# Patient Record
Sex: FEMALE | Race: WHITE | NOT HISPANIC OR LATINO | Employment: OTHER | URBAN - METROPOLITAN AREA
[De-identification: names, ages, dates, MRNs, and addresses within clinical notes are randomized per-mention and may not be internally consistent; named-entity substitution may affect disease eponyms.]

---

## 2017-01-01 ENCOUNTER — HOSPITAL ENCOUNTER (OUTPATIENT)
Facility: MEDICAL CENTER | Age: 62
End: 2017-01-01
Attending: NURSE PRACTITIONER
Payer: COMMERCIAL

## 2017-01-01 ENCOUNTER — OFFICE VISIT (OUTPATIENT)
Dept: URGENT CARE | Facility: CLINIC | Age: 62
End: 2017-01-01
Payer: COMMERCIAL

## 2017-01-01 VITALS
RESPIRATION RATE: 16 BRPM | HEIGHT: 67 IN | SYSTOLIC BLOOD PRESSURE: 120 MMHG | DIASTOLIC BLOOD PRESSURE: 82 MMHG | BODY MASS INDEX: 28.25 KG/M2 | TEMPERATURE: 98.1 F | HEART RATE: 84 BPM | OXYGEN SATURATION: 98 % | WEIGHT: 180 LBS

## 2017-01-01 DIAGNOSIS — N30.01 ACUTE CYSTITIS WITH HEMATURIA: ICD-10-CM

## 2017-01-01 DIAGNOSIS — R30.0 DYSURIA: ICD-10-CM

## 2017-01-01 LAB
APPEARANCE UR: NORMAL
BILIRUB UR STRIP-MCNC: NORMAL MG/DL
COLOR UR AUTO: YELLOW
GLUCOSE UR STRIP.AUTO-MCNC: NORMAL MG/DL
KETONES UR STRIP.AUTO-MCNC: NORMAL MG/DL
LEUKOCYTE ESTERASE UR QL STRIP.AUTO: NORMAL
NITRITE UR QL STRIP.AUTO: NORMAL
PH UR STRIP.AUTO: 6.5 [PH] (ref 5–8)
PROT UR QL STRIP: NORMAL MG/DL
RBC UR QL AUTO: NORMAL
SP GR UR STRIP.AUTO: 1
UROBILINOGEN UR STRIP-MCNC: NORMAL MG/DL

## 2017-01-01 PROCEDURE — 87086 URINE CULTURE/COLONY COUNT: CPT

## 2017-01-01 PROCEDURE — 99000 SPECIMEN HANDLING OFFICE-LAB: CPT | Performed by: NURSE PRACTITIONER

## 2017-01-01 PROCEDURE — 87077 CULTURE AEROBIC IDENTIFY: CPT

## 2017-01-01 PROCEDURE — 99214 OFFICE O/P EST MOD 30 MIN: CPT | Performed by: NURSE PRACTITIONER

## 2017-01-01 PROCEDURE — 87186 SC STD MICRODIL/AGAR DIL: CPT

## 2017-01-01 PROCEDURE — 81002 URINALYSIS NONAUTO W/O SCOPE: CPT | Performed by: NURSE PRACTITIONER

## 2017-01-01 RX ORDER — NITROFURANTOIN 25; 75 MG/1; MG/1
100 CAPSULE ORAL 2 TIMES DAILY
Qty: 14 CAP | Refills: 0 | Status: SHIPPED | OUTPATIENT
Start: 2017-01-01 | End: 2017-01-08

## 2017-01-01 RX ORDER — PHENAZOPYRIDINE HYDROCHLORIDE 200 MG/1
200 TABLET, FILM COATED ORAL 3 TIMES DAILY PRN
Qty: 10 TAB | Refills: 0 | Status: SHIPPED | OUTPATIENT
Start: 2017-01-01 | End: 2017-12-05

## 2017-01-01 ASSESSMENT — ENCOUNTER SYMPTOMS
MYALGIAS: 0
CHILLS: 0
FEVER: 0
NAUSEA: 0
VOMITING: 0

## 2017-01-01 NOTE — MR AVS SNAPSHOT
"        Keisha Vega Dariela   2017 11:45 AM   Office Visit   MRN: [id number]    Department:  [facility]   Dept Phone:  [phone]    Description:  Female : 1955   Provider:  Cathey J Hamman, A.P.N.           Reason for Visit     Dysuria urgency, frequency, blood in urine, tenderness      Allergies as of 2017     Allergen Noted Reactions    Codeine 2009   Hives    Erythromycin 2009   Hives, Vomiting    Rxn = unknown - years ago     Inapsine [Droperidol] 2009       agitated      You were diagnosed with     Dysuria   [788.1.ICD-9-CM]       Acute cystitis with hematuria   [680117]         Vital Signs     Blood Pressure Pulse Temperature Respirations Height Weight    120/82 mmHg 84 36.7 °C (98.1 °F) 16 1.702 m (5' 7\") 81.647 kg (180 lb)    Body Mass Index Oxygen Saturation Breastfeeding?             28.19 kg/m2 98% No         Basic Information     Date Of Birth Sex Race Ethnicity Preferred Language    1955 Female White Non- English      Problem List              ICD-10-CM Priority Class Noted - Resolved    Vomiting R11.10   3/15/2014 - Present    Chills with fever R50.9   3/15/2014 - Present    Fever R50.9   3/15/2014 - Present    Metabolic acidosis E87.2   3/16/2014 - Present    Hypotension (arterial) I95.9   3/16/2014 - Present    Iatrogenic hyperthyroidism E05.80   3/16/2014 - Present    Hypokalemia E87.6   3/16/2014 - Present    Lower urinary tract infectious disease N39.0   3/16/2014 - Present    HTN (hypertension) I10   3/17/2014 - Present    Bradycardia R00.1   3/18/2014 - Present    Epigastric pain R10.13   3/18/2014 - Present    Volume overload E87.70   3/18/2014 - Present    Mitral valve disorder I05.9   2014 - Present    GERD (gastroesophageal reflux disease) K21.9   10/22/2015 - Present    Leukocytosis D72.829   10/22/2015 - Present    Serum lipase elevation R74.8   10/22/2015 - Present    Hematemesis with nausea K92.0, R11.0   10/22/2015 - Present   "   Health Maintenance        Date Due Completion Dates    IMM DTaP/Tdap/Td Vaccine (1 - Tdap) 2/11/1975 ---    PAP SMEAR 2/11/1977 ---    COLONOSCOPY 2/11/2006 ---    IMM ZOSTER VACCINE 2/11/2016 ---    IMM INFLUENZA (1) 9/1/2016 10/7/2015, 10/18/2013    MAMMOGRAM 11/28/2017 11/28/2016, 5/19/2015, 11/27/2012, 7/7/2011, 5/20/2010, 5/20/2010, 3/1/2006, 2/3/2005            Current Immunizations     Influenza TIV (IM) 10/7/2015, 10/18/2013    Pneumococcal Vaccine (PCV7) Historical Data 10/1/2014      Below and/or attached are the medications your provider expects you to take. Review all of your home medications and newly ordered medications with your provider and/or pharmacist. Follow medication instructions as directed by your provider and/or pharmacist. Please keep your medication list with you and share with your provider. Update the information when medications are discontinued, doses are changed, or new medications (including over-the-counter products) are added; and carry medication information at all times in the event of emergency situations     Allergies:  CODEINE - Hives     ERYTHROMYCIN - Hives,Vomiting     INAPSINE - (reactions not documented)               Medications  Valid as of: January 01, 2017 - 12:12 PM    Generic Name Brand Name Tablet Size Instructions for use    Albuterol Sulfate (Aero Soln) albuterol 108 (90 BASE) MCG/ACT Inhale 2 Puffs by mouth every 6 hours as needed for Shortness of Breath.        DiphenhydrAMINE HCl (Tab) BENADRYL 25 MG Take 12.5 mg by mouth at bedtime as needed.  May repeat 1 time. for Sleep.        Est Estrogens-Methyltest (Tab) ESTRATEST 1.25-2.5 MG Take 1 Tab by mouth every day.        Hydrocod Polst-Chlorphen Polst (Liquid CR) TUSSIONEX 10-8 MG/5ML Take 5 mL by mouth every 12 hours.        Melatonin (Tab) Melatonin 5 MG Take  by mouth.        Multiple Vitamin (Tab) THERAGRAN  Take 1 Tab by mouth every day. Indications: **OTC**        Nitrofurantoin Monohyd Macro (Cap)  MACROBID 100 MG Take 1 Cap by mouth 2 times a day for 7 days.        Omega-3 Fatty Acids (Cap) OMEGA 3 FA 1000 MG Take 1,000 mg by mouth 2 Times a Day. Indications: **OTC**        Omeprazole (CAPSULE DELAYED RELEASE) PRILOSEC 20 MG Take 1 Cap by mouth 2 times a day. Indications: **OTC**        Phenazopyridine HCl (Tab) PYRIDIUM 200 MG Take 1 Tab by mouth 3 times a day as needed.        Potassium Chloride Abimbola CR (Tab CR) K-DUR 20 MEQ Take 20 mEq by mouth every evening.        Valsartan-Hydrochlorothiazide (Tab) DIOVAN--12.5 MG Take 1 Tab by mouth every day.        .                 Medicines prescribed today were sent to:     [facility] #[id number] - [place] - [address]    [address] [place] [address]    Phone: [phone] Fax: [phone]    Open 24 Hours?: No      Medication refill instructions:       If your prescription bottle indicates you have medication refills left, it is not necessary to call your provider’s office. Please contact your pharmacy and they will refill your medication.    If your prescription bottle indicates you do not have any refills left, you may request refills at any time through one of the following ways: The online [email or web address] system (except Urgent Care), by calling your provider’s office, or by asking your pharmacy to contact your provider’s office with a refill request. Medication refills are processed only during regular business hours and may not be available until the next business day. Your provider may request additional information or to have a follow-up visit with you prior to refilling your medication.   *Please Note: Medication refills are assigned a new Rx number when refilled electronically. Your pharmacy may indicate that no refills were authorized even though a new prescription for the same medication is available at the pharmacy. Please request the medicine by name with the pharmacy before contacting your provider for a refill.        Your To Do List      Future Labs/Procedures Complete By Expires    URINE CULTURE(NEW)  As directed 1/1/2018         MyChart Access Code: Activation code not generated  Current dinCloud Status: Active

## 2017-01-01 NOTE — PROGRESS NOTES
"Subjective:      Keisha Lyle is a 61 y.o. female who presents with Dysuria    Past Medical History   Diagnosis Date   • Hypertension      medicated     Social History     Social History   • Marital Status:      Spouse Name: N/A   • Number of Children: N/A   • Years of Education: N/A     Occupational History   • Not on file.     Social History Main Topics   • Smoking status: Never Smoker    • Smokeless tobacco: Not on file   • Alcohol Use: Yes      Comment: occ   • Drug Use: No   • Sexual Activity: Not on file     Other Topics Concern   • Not on file     Social History Narrative     Family History   Problem Relation Age of Onset   • Cancer Paternal Grandmother      Patient presents today with complaint of dysuria urgency and frequency with blood in her urine. Symptoms started yesterday evening and worsened through the night. Patient denies any fever, aches, or chills. States she has some mild pain to the right flank area but she believes this may be muscular as she did split wood yesterday          Dysuria   This is a new problem. The current episode started yesterday. The problem occurs every urination. The problem has been gradually worsening. The quality of the pain is described as aching and burning. The pain is moderate. There has been no fever. Associated symptoms include frequency, hematuria and urgency. Pertinent negatives include no chills, nausea or vomiting. She has tried increased fluids for the symptoms. The treatment provided no relief.       Review of Systems   Constitutional: Negative for fever, chills and malaise/fatigue.   Gastrointestinal: Negative for nausea and vomiting.   Genitourinary: Positive for dysuria, urgency, frequency and hematuria.   Musculoskeletal: Negative for myalgias.     All other systems reviewed and are negative      Objective:     /82 mmHg  Pulse 84  Temp(Src) 36.7 °C (98.1 °F)  Resp 16  Ht 1.702 m (5' 7\")  Wt 81.647 kg (180 lb)  BMI 28.19 kg/m2  " SpO2 98%  Breastfeeding? No     Physical Exam   Constitutional: She is oriented to person, place, and time. She appears well-developed and well-nourished. No distress.   Cardiovascular: Normal rate and regular rhythm.    Pulmonary/Chest: Effort normal and breath sounds normal.   Abdominal: Soft. She exhibits no distension and no mass. There is tenderness. There is no rebound and no guarding. No hernia.   Positive suprapubic tenderness  Mild right CVA tenderness   Neurological: She is alert and oriented to person, place, and time.   Skin: Skin is warm and dry. She is not diaphoretic.   Psychiatric: She has a normal mood and affect. Her behavior is normal.   Vitals reviewed.           UA: positive blood, positive leukocytes     Assessment/Plan:   Hemorrhagic cystitis  Right flank pain, mild  -Macrobid  -Urine culture  -Pyridium  -Strict ER precautions for increasing flank pain, fever, flulike symptoms, weakness  There are no diagnoses linked to this encounter.

## 2017-01-02 DIAGNOSIS — R30.0 DYSURIA: ICD-10-CM

## 2017-01-04 LAB
BACTERIA UR CULT: ABNORMAL
SIGNIFICANT IND 70042: ABNORMAL
SOURCE SOURCE: ABNORMAL

## 2017-08-14 ENCOUNTER — OFFICE VISIT (OUTPATIENT)
Dept: URGENT CARE | Facility: CLINIC | Age: 62
End: 2017-08-14
Payer: COMMERCIAL

## 2017-08-14 VITALS
HEART RATE: 71 BPM | HEIGHT: 67 IN | OXYGEN SATURATION: 94 % | DIASTOLIC BLOOD PRESSURE: 80 MMHG | TEMPERATURE: 97.9 F | WEIGHT: 185 LBS | SYSTOLIC BLOOD PRESSURE: 108 MMHG | BODY MASS INDEX: 29.03 KG/M2

## 2017-08-14 DIAGNOSIS — I10 ESSENTIAL HYPERTENSION: ICD-10-CM

## 2017-08-14 DIAGNOSIS — H69.91 EUSTACHIAN TUBE DYSFUNCTION, RIGHT: ICD-10-CM

## 2017-08-14 DIAGNOSIS — H81.10 BPPV (BENIGN PAROXYSMAL POSITIONAL VERTIGO), UNSPECIFIED LATERALITY: ICD-10-CM

## 2017-08-14 PROCEDURE — 93000 ELECTROCARDIOGRAM COMPLETE: CPT | Performed by: FAMILY MEDICINE

## 2017-08-14 PROCEDURE — 99214 OFFICE O/P EST MOD 30 MIN: CPT | Performed by: FAMILY MEDICINE

## 2017-08-14 RX ORDER — ONDANSETRON HYDROCHLORIDE 8 MG/1
8 TABLET, FILM COATED ORAL EVERY 8 HOURS PRN
Qty: 15 TAB | Refills: 0 | Status: SHIPPED | OUTPATIENT
Start: 2017-08-14 | End: 2017-12-05

## 2017-08-14 RX ORDER — MECLIZINE HYDROCHLORIDE 25 MG/1
25 TABLET ORAL 3 TIMES DAILY PRN
Qty: 15 TAB | Refills: 0 | Status: SHIPPED | OUTPATIENT
Start: 2017-08-14 | End: 2017-12-05

## 2017-08-14 NOTE — PROGRESS NOTES
Subjective:      Keisha Lyle is a 62 y.o. female who presents with Vertigo   patient presents to urgent care with acute onset of feeling dizzy when she moves her head. She was at dinner with her family on Saturday when she got up she felt that she was spinning the room was spinning as well got better as she sat down. Symptoms improved overnight she took a Claritin thinking maybe there was some fluid behind her ear but it has been off-and-on since then. She's had some mild nausea as well. No problems with thought process or speech no numbness tingling or weakness of any of the extremities. No recent head trauma. She denies any chest pain or shortness of breath.      HPI  ROS    PMH:  has a past medical history of Hypertension. She also has no past medical history of Breast cancer (CMS-HCC).  MEDS:   Current outpatient prescriptions:   •  omeprazole (PRILOSEC) 20 MG delayed-release capsule, Take 1 Cap by mouth 2 times a day. Indications: **OTC**, Disp: , Rfl:   •  esterified estrogen-methyltest (ESTRATEST) 1.25-2.5 MG Tab, Take 1 Tab by mouth every day., Disp: , Rfl:   •  Melatonin 5 MG Tab, Take  by mouth., Disp: , Rfl:   •  potassium chloride SA (K-DUR) 20 MEQ Tab CR, Take 20 mEq by mouth every evening., Disp: , Rfl:   •  valsartan-hydrochlorothiazide (DIOVAN HCT) 160-12.5 MG per tablet, Take 1 Tab by mouth every day., Disp: , Rfl:   •  multivitamin (THERAGRAN) TABS, Take 1 Tab by mouth every day. Indications: **OTC**, Disp: , Rfl:   •  docosahexanoic acid (OMEGA 3 FA) 1000 MG CAPS, Take 1,000 mg by mouth 2 Times a Day. Indications: **OTC**, Disp: , Rfl:   •  phenazopyridine (PYRIDIUM) 200 MG Tab, Take 1 Tab by mouth 3 times a day as needed., Disp: 10 Tab, Rfl: 0  •  hydrocod polst-chlorphen polst (TUSSIONEX PENNKINETIC ER) 10-8 MG/5ML Liquid CR, Take 5 mL by mouth every 12 hours., Disp: 120 mL, Rfl: 0  •  albuterol (VENTOLIN OR PROVENTIL) 108 (90 BASE) MCG/ACT Aero Soln inhalation aerosol, Inhale 2 Puffs by  "mouth every 6 hours as needed for Shortness of Breath., Disp: 8.5 g, Rfl: 3  •  diphenhydrAMINE (BENADRYL) 25 MG Tab, Take 12.5 mg by mouth at bedtime as needed.  May repeat 1 time. for Sleep., Disp: , Rfl:   ALLERGIES:   Allergies   Allergen Reactions   • Codeine Hives   • Erythromycin Hives and Vomiting     Rxn = unknown - years ago    • Inapsine [Droperidol]      agitated   SURGHX:   Past Surgical History   Procedure Laterality Date   • Other abdominal surgery       appy   • Gyn surgery       hysterectomy   • Other       tonsillectomy   SOCHX:  reports that she has never smoked. She has never used smokeless tobacco. She reports that she drinks alcohol. She reports that she does not use illicit drugs.  FH: Family history was reviewed, no pertinent findings to report   Objective:     /80 mmHg  Pulse 71  Temp(Src) 36.6 °C (97.9 °F)  Ht 1.702 m (5' 7\")  Wt 83.915 kg (185 lb)  BMI 28.97 kg/m2  SpO2 94%     Physical Exam   Constitutional: She is oriented to person, place, and time. She appears well-developed and well-nourished. No distress.   HENT:   Mouth/Throat: Oropharynx is clear and moist.   Right TM with mild fluid behind the present   Eyes: Conjunctivae and EOM are normal. Pupils are equal, round, and reactive to light.   Neck: Normal range of motion.   Cardiovascular: Normal rate, regular rhythm, normal heart sounds and intact distal pulses.  Exam reveals no gallop and no friction rub.    No murmur heard.  Pulmonary/Chest: Effort normal and breath sounds normal. No respiratory distress. She has no wheezes. She has no rales. She exhibits no tenderness.   Musculoskeletal: Normal range of motion.   Neurological: She is alert and oriented to person, place, and time.   Dizzy symptoms are reproduced when patient's head is moved from varying positions.   Skin: Skin is warm and dry. No rash noted. She is not diaphoretic. No erythema. No pallor.   Psychiatric: She has a normal mood and affect. Her behavior " is normal. Judgment and thought content normal.       Diagnostics: EKG wnl no acute abnormality per my review     Assessment/Plan:     1. BPPV (benign paroxysmal positional vertigo), unspecified laterality  EKG    EKG    meclizine (ANTIVERT) 25 MG Tab    ondansetron (ZOFRAN) 8 MG Tab   2. Essential hypertension     3. Eustachian tube dysfunction, right       Follow-up with primary care provider within 4-5 days, emergency room precautions discussed.  Patient and/or family appears understanding of information.

## 2017-08-14 NOTE — MR AVS SNAPSHOT
"        Keisha Vega Dariela   2017 11:00 AM   Office Visit   MRN: [id number]    Department:  [facility]   Dept Phone:  [phone]    Description:  Female : 1955   Provider:  Fe Junior D.O.           Reason for Visit     Vertigo started on Sat., was better yesterday, last night very dizzy      Allergies as of 2017     Allergen Noted Reactions    Codeine 2009   Hives    Erythromycin 2009   Hives, Vomiting    Rxn = unknown - years ago     Inapsine [Droperidol] 2009       agitated      You were diagnosed with     BPPV (benign paroxysmal positional vertigo), unspecified laterality   [5941573]       Essential hypertension   [0452195]         Vital Signs     Blood Pressure Pulse Temperature Height Weight Body Mass Index    108/80 mmHg 71 36.6 °C (97.9 °F) 1.702 m (5' 7\") 83.915 kg (185 lb) 28.97 kg/m2    Oxygen Saturation Smoking Status                94% Never Smoker           Basic Information     Date Of Birth Sex Race Ethnicity Preferred Language    1955 Female White Non- English      Problem List              ICD-10-CM Priority Class Noted - Resolved    Vomiting R11.10   3/15/2014 - Present    Chills with fever R50.9   3/15/2014 - Present    Fever R50.9   3/15/2014 - Present    Metabolic acidosis E87.2   3/16/2014 - Present    Hypotension (arterial) I95.9   3/16/2014 - Present    Iatrogenic hyperthyroidism E05.80   3/16/2014 - Present    Hypokalemia E87.6   3/16/2014 - Present    Lower urinary tract infectious disease N39.0   3/16/2014 - Present    HTN (hypertension) I10   3/17/2014 - Present    Bradycardia R00.1   3/18/2014 - Present    Epigastric pain R10.13   3/18/2014 - Present    Volume overload E87.70   3/18/2014 - Present    Mitral valve disorder I05.9   2014 - Present    GERD (gastroesophageal reflux disease) K21.9   10/22/2015 - Present    Leukocytosis D72.829   10/22/2015 - Present    Serum lipase elevation R74.8   10/22/2015 - Present   " Hematemesis with nausea K92.0, R11.0   10/22/2015 - Present      Health Maintenance        Date Due Completion Dates    IMM DTaP/Tdap/Td Vaccine (1 - Tdap) 2/11/1975 ---    PAP SMEAR 2/11/1977 ---    COLONOSCOPY 2/11/2006 ---    IMM ZOSTER VACCINE 2/11/2016 ---    IMM INFLUENZA (1) 9/1/2017 10/7/2015, 10/18/2013    MAMMOGRAM 11/28/2017 11/28/2016, 5/19/2015, 11/27/2012, 7/7/2011, 5/20/2010, 5/20/2010, 3/1/2006, 2/3/2005            Current Immunizations     Influenza TIV (IM) 10/7/2015, 10/18/2013    Pneumococcal Vaccine (PCV7) Historical Data 10/1/2014      Below and/or attached are the medications your provider expects you to take. Review all of your home medications and newly ordered medications with your provider and/or pharmacist. Follow medication instructions as directed by your provider and/or pharmacist. Please keep your medication list with you and share with your provider. Update the information when medications are discontinued, doses are changed, or new medications (including over-the-counter products) are added; and carry medication information at all times in the event of emergency situations     Allergies:  CODEINE - Hives     ERYTHROMYCIN - Hives,Vomiting     INAPSINE - (reactions not documented)               Medications  Valid as of: August 14, 2017 - 11:53 AM    Generic Name Brand Name Tablet Size Instructions for use    Albuterol Sulfate (Aero Soln) albuterol 108 (90 BASE) MCG/ACT Inhale 2 Puffs by mouth every 6 hours as needed for Shortness of Breath.        DiphenhydrAMINE HCl (Tab) BENADRYL 25 MG Take 12.5 mg by mouth at bedtime as needed.  May repeat 1 time. for Sleep.        Est Estrogens-Methyltest (Tab) ESTRATEST 1.25-2.5 MG Take 1 Tab by mouth every day.        Hydrocod Polst-Chlorphen Polst (Liquid CR) TUSSIONEX 10-8 MG/5ML Take 5 mL by mouth every 12 hours.        Melatonin (Tab) Melatonin 5 MG Take  by mouth.        Multiple Vitamin (Tab) THERAGRAN  Take 1 Tab by mouth every day.  Indications: **OTC**        Omega-3 Fatty Acids (Cap) OMEGA 3 FA 1000 MG Take 1,000 mg by mouth 2 Times a Day. Indications: **OTC**        Omeprazole (CAPSULE DELAYED RELEASE) PRILOSEC 20 MG Take 1 Cap by mouth 2 times a day. Indications: **OTC**        Phenazopyridine HCl (Tab) PYRIDIUM 200 MG Take 1 Tab by mouth 3 times a day as needed.        Potassium Chloride Abimbola CR (Tab CR) Kdur 20 MEQ Take 20 mEq by mouth every evening.        Valsartan-Hydrochlorothiazide (Tab) DIOVAN--12.5 MG Take 1 Tab by mouth every day.        .                 Medicines prescribed today were sent to:     [facility] #[id number] - [place], [place] - [address]    [address] [place] [address]    Phone: [phone] Fax: [phone]    Open 24 Hours?: No      Medication refill instructions:       If your prescription bottle indicates you have medication refills left, it is not necessary to call your provider’s office. Please contact your pharmacy and they will refill your medication.    If your prescription bottle indicates you do not have any refills left, you may request refills at any time through one of the following ways: The online [employer] system (except Urgent Care), by calling your provider’s office, or by asking your pharmacy to contact your provider’s office with a refill request. Medication refills are processed only during regular business hours and may not be available until the next business day. Your provider may request additional information or to have a follow-up visit with you prior to refilling your medication.   *Please Note: Medication refills are assigned a new Rx number when refilled electronically. Your pharmacy may indicate that no refills were authorized even though a new prescription for the same medication is available at the pharmacy. Please request the medicine by name with the pharmacy before contacting your provider for a refill.        Your To Do List     Future Labs/Procedures Complete By  Expires    [facility]  As directed 8/14/2018         Capture Media Access Code: Activation code not generated  Current Capture Media Status: Active

## 2017-12-05 ENCOUNTER — OFFICE VISIT (OUTPATIENT)
Dept: URGENT CARE | Facility: CLINIC | Age: 62
End: 2017-12-05
Payer: COMMERCIAL

## 2017-12-05 VITALS
WEIGHT: 185 LBS | OXYGEN SATURATION: 94 % | HEART RATE: 88 BPM | SYSTOLIC BLOOD PRESSURE: 120 MMHG | TEMPERATURE: 98.9 F | BODY MASS INDEX: 29.03 KG/M2 | RESPIRATION RATE: 20 BRPM | DIASTOLIC BLOOD PRESSURE: 78 MMHG | HEIGHT: 67 IN

## 2017-12-05 DIAGNOSIS — J20.9 ACUTE BRONCHITIS, UNSPECIFIED ORGANISM: Primary | ICD-10-CM

## 2017-12-05 DIAGNOSIS — J98.8 RTI (RESPIRATORY TRACT INFECTION): ICD-10-CM

## 2017-12-05 LAB
FLUAV+FLUBV AG SPEC QL IA: NEGATIVE
INT CON NEG: NEGATIVE
INT CON POS: POSITIVE

## 2017-12-05 PROCEDURE — 87804 INFLUENZA ASSAY W/OPTIC: CPT | Performed by: FAMILY MEDICINE

## 2017-12-05 PROCEDURE — 99214 OFFICE O/P EST MOD 30 MIN: CPT | Performed by: FAMILY MEDICINE

## 2017-12-05 RX ORDER — CEFDINIR 300 MG/1
300 CAPSULE ORAL EVERY 12 HOURS
Qty: 14 CAP | Refills: 0 | Status: SHIPPED | OUTPATIENT
Start: 2017-12-05 | End: 2017-12-12

## 2017-12-05 RX ORDER — BENZONATATE 100 MG/1
200 CAPSULE ORAL 3 TIMES DAILY PRN
Qty: 30 CAP | Refills: 1 | Status: SHIPPED | OUTPATIENT
Start: 2017-12-05 | End: 2018-09-05

## 2017-12-05 RX ORDER — ALBUTEROL SULFATE 90 UG/1
2 AEROSOL, METERED RESPIRATORY (INHALATION) EVERY 6 HOURS PRN
Qty: 8.5 G | Refills: 3 | Status: SHIPPED | OUTPATIENT
Start: 2017-12-05 | End: 2019-04-17 | Stop reason: SDUPTHER

## 2017-12-05 ASSESSMENT — ENCOUNTER SYMPTOMS
DIZZINESS: 0
FEVER: 0
FOCAL WEAKNESS: 0
CHILLS: 0
ORTHOPNEA: 0
SPUTUM PRODUCTION: 0
COUGH: 1

## 2017-12-05 NOTE — PROGRESS NOTES
Subjective:      Keisha Lyle is a 62 y.o. female who presents with Cough (Few days dry cough and chest congestion .)    Chief Complaint   Patient presents with   • Cough     Few days dry cough and chest congestion .        - This is a very pleasant 62 y.o. female with complaints of cough x 3wks w/ sputum. No NV/cp/sob. Some fevers and body aches past 3 days. Tm101. Started off w/ runny nose.           ALLERGIES:  Codeine; Erythromycin; and Inapsine [droperidol]     PMH:  Past Medical History:   Diagnosis Date   • Hypertension     medicated        MEDS:    Current Outpatient Prescriptions:   •  cefdinir (OMNICEF) 300 MG Cap, Take 1 Cap by mouth every 12 hours for 7 days., Disp: 14 Cap, Rfl: 0  •  benzonatate (TESSALON) 100 MG Cap, Take 2 Caps by mouth 3 times a day as needed for Cough., Disp: 30 Cap, Rfl: 1  •  albuterol (PROAIR HFA) 108 (90 Base) MCG/ACT Aero Soln inhalation aerosol, Inhale 2 Puffs by mouth every 6 hours as needed for Shortness of Breath., Disp: 8.5 g, Rfl: 3  •  albuterol (VENTOLIN OR PROVENTIL) 108 (90 BASE) MCG/ACT Aero Soln inhalation aerosol, Inhale 2 Puffs by mouth every 6 hours as needed for Shortness of Breath., Disp: 8.5 g, Rfl: 3  •  omeprazole (PRILOSEC) 20 MG delayed-release capsule, Take 1 Cap by mouth 2 times a day. Indications: **OTC**, Disp: , Rfl:   •  esterified estrogen-methyltest (ESTRATEST) 1.25-2.5 MG Tab, Take 1 Tab by mouth every day., Disp: , Rfl:   •  potassium chloride SA (K-DUR) 20 MEQ Tab CR, Take 20 mEq by mouth every evening., Disp: , Rfl:   •  valsartan-hydrochlorothiazide (DIOVAN HCT) 160-12.5 MG per tablet, Take 1 Tab by mouth every day., Disp: , Rfl:   •  Melatonin 5 MG Tab, Take  by mouth., Disp: , Rfl:   •  diphenhydrAMINE (BENADRYL) 25 MG Tab, Take 12.5 mg by mouth at bedtime as needed.  May repeat 1 time. for Sleep., Disp: , Rfl:   •  multivitamin (THERAGRAN) TABS, Take 1 Tab by mouth every day. Indications: **OTC**, Disp: , Rfl:   •  docosahexanoic  "acid (OMEGA 3 FA) 1000 MG CAPS, Take 1,000 mg by mouth 2 Times a Day. Indications: **OTC**, Disp: , Rfl:     ** I have documented what I find to be significant in regards to past medical, social, family and surgical history  in my HPI or under PMH/PSH/FH review section, otherwise it is contributory **           HPI    Review of Systems   Constitutional: Negative for chills and fever.   Respiratory: Positive for cough. Negative for sputum production.    Cardiovascular: Negative for chest pain and orthopnea.   Neurological: Negative for dizziness and focal weakness.          Objective:     /78   Pulse 88   Temp 37.2 °C (98.9 °F)   Resp 20   Ht 1.702 m (5' 7\")   Wt 83.9 kg (185 lb)   SpO2 94%   BMI 28.98 kg/m²      Physical Exam   Constitutional: She appears well-developed. No distress.   HENT:   Head: Normocephalic and atraumatic.   Mouth/Throat: Oropharynx is clear and moist.   Eyes: Conjunctivae are normal.   Neck: Neck supple.   Cardiovascular: Regular rhythm.    No murmur heard.  Pulmonary/Chest: Effort normal. No respiratory distress.   Neurological: She is alert. She exhibits normal muscle tone.   Skin: Skin is warm and dry.   Psychiatric: She has a normal mood and affect. Judgment normal.   Nursing note and vitals reviewed.              Assessment/Plan:         1. Acute bronchitis, unspecified organism  cefdinir (OMNICEF) 300 MG Cap    benzonatate (TESSALON) 100 MG Cap    albuterol (PROAIR HFA) 108 (90 Base) MCG/ACT Aero Soln inhalation aerosol   2. RTI (respiratory tract infection)  POCT Influenza A/B             Dx & d/c instructions discussed w/ patient and/or family members. Follow up w/ Prvt Dr or here in 3-4 days if not getting better, sooner if needed,  ER if worse and UC/PCP unavailable.        Possible side effects (i.e. Rash, GI upset/constipation, sedation, elevation of BP or sugars) of any medications given discussed.                "

## 2017-12-21 ENCOUNTER — HOSPITAL ENCOUNTER (OUTPATIENT)
Dept: RADIOLOGY | Facility: MEDICAL CENTER | Age: 62
End: 2017-12-21
Attending: FAMILY MEDICINE
Payer: COMMERCIAL

## 2017-12-21 DIAGNOSIS — Z12.31 SCREENING MAMMOGRAM, ENCOUNTER FOR: ICD-10-CM

## 2017-12-21 PROCEDURE — G0202 SCR MAMMO BI INCL CAD: HCPCS

## 2018-03-02 ENCOUNTER — OFFICE VISIT (OUTPATIENT)
Dept: MEDICAL GROUP | Age: 63
End: 2018-03-02
Payer: COMMERCIAL

## 2018-03-02 VITALS
WEIGHT: 187 LBS | TEMPERATURE: 99 F | HEART RATE: 84 BPM | SYSTOLIC BLOOD PRESSURE: 136 MMHG | BODY MASS INDEX: 29.35 KG/M2 | OXYGEN SATURATION: 92 % | HEIGHT: 67 IN | DIASTOLIC BLOOD PRESSURE: 84 MMHG

## 2018-03-02 DIAGNOSIS — Z12.11 SCREENING FOR COLON CANCER: ICD-10-CM

## 2018-03-02 DIAGNOSIS — E87.6 HYPOKALEMIA: ICD-10-CM

## 2018-03-02 DIAGNOSIS — L82.0 INFLAMED SEBORRHEIC KERATOSIS: ICD-10-CM

## 2018-03-02 DIAGNOSIS — I05.9 MITRAL VALVE DISORDER: ICD-10-CM

## 2018-03-02 DIAGNOSIS — K21.00 GASTROESOPHAGEAL REFLUX DISEASE WITH ESOPHAGITIS: ICD-10-CM

## 2018-03-02 DIAGNOSIS — Z00.00 PREVENTATIVE HEALTH CARE: ICD-10-CM

## 2018-03-02 DIAGNOSIS — R68.82 DECREASED LIBIDO: ICD-10-CM

## 2018-03-02 DIAGNOSIS — I10 ESSENTIAL HYPERTENSION: ICD-10-CM

## 2018-03-02 DIAGNOSIS — L57.0 AK (ACTINIC KERATOSIS): ICD-10-CM

## 2018-03-02 DIAGNOSIS — Z23 NEED FOR VACCINATION: ICD-10-CM

## 2018-03-02 PROCEDURE — 17110 DESTRUCTION B9 LES UP TO 14: CPT | Performed by: FAMILY MEDICINE

## 2018-03-02 PROCEDURE — 90471 IMMUNIZATION ADMIN: CPT | Performed by: FAMILY MEDICINE

## 2018-03-02 PROCEDURE — 17000 DESTRUCT PREMALG LESION: CPT | Mod: 59 | Performed by: FAMILY MEDICINE

## 2018-03-02 PROCEDURE — 99214 OFFICE O/P EST MOD 30 MIN: CPT | Mod: 25 | Performed by: FAMILY MEDICINE

## 2018-03-02 PROCEDURE — 90715 TDAP VACCINE 7 YRS/> IM: CPT | Performed by: FAMILY MEDICINE

## 2018-03-02 PROCEDURE — 17003 DESTRUCT PREMALG LES 2-14: CPT | Performed by: FAMILY MEDICINE

## 2018-03-02 RX ORDER — ASPIRIN 81 MG/1
81 TABLET, CHEWABLE ORAL DAILY
Qty: 360 TAB | Refills: 1 | Status: SHIPPED | OUTPATIENT
Start: 2018-03-02 | End: 2021-08-20

## 2018-03-02 ASSESSMENT — PATIENT HEALTH QUESTIONNAIRE - PHQ9: CLINICAL INTERPRETATION OF PHQ2 SCORE: 0

## 2018-03-02 NOTE — ASSESSMENT & PLAN NOTE
Patient is a 63-year-old female who presents to clinic to establish care. She has a significant past medical history of hypokalemia, hypertension, decreased libido, GERD.   The patient denied any chest pain, no sob, no jordan, no  pnd, no orthopnea, no headache, no changes in vision, no numbness or tingling, no nausea, no diarrhea, no abdominal pain, no fevers, no chills, no bright red blood per rectum, no  difficulty urinating, no burning during micturition, no depressed mood, no other concerns.

## 2018-03-02 NOTE — ASSESSMENT & PLAN NOTE
Valsartan-HCTZ 160/12.5mg po q24h      Patient has been diagnosed with essential hypertension for years. Has been compliant with medications and tolerating them with no mention of any adverse events.  The patient is NOT  on a Baby ASPIRIN nor a  STATIN.  The patient has been tollerating the BP meds without any issues,also monitors BP at home. No tunnel vision, no cough, no changes in vision, no lightheadedness, no fatigue, no syncopal or presyncopal episodes, no edema, no new rashes. Patient also  denied chest pain, headache, change in vision, dyspnea on exertion, shortness of breath, PND, back pain, numbness or tingling anywhere. He is tolerating his medication well, he denies any lightheadedness, no tunnel vision, no syncopal episodes, no fatigue, no leg weakness no falls.

## 2018-03-02 NOTE — ASSESSMENT & PLAN NOTE
Patient states she has irritating barnacles on her back which flaked scab up and bleed when she scratches them.

## 2018-03-02 NOTE — ASSESSMENT & PLAN NOTE
Omeprazole 20mg     Patient denies any difficulty swallowing, no regurgitation, no eructation, no weight loss, no nocturnal asthma no food getting stuck in the chest.

## 2018-03-02 NOTE — PROGRESS NOTES
This medical record contains text that has been entered with the assistance of computer voice recognition and dictation software.  Therefore, it may contain unintended errors in text, spelling, punctuation, or grammar    Chief Complaint   Patient presents with   • [facility] Willie Layne2leidy Contrerasrebecca Lyle is a 63 y.o. female here evaluation and management of: Establish care, hypertension, GERD, decreased libido, hypokalemia      HPI:     HTN (hypertension)  Valsartan-HCTZ 160/12.5mg po q24h      Patient has been diagnosed with essential hypertension for years. Has been compliant with medications and tolerating them with no mention of any adverse events.  The patient is NOT  on a Baby ASPIRIN nor a  STATIN.  The patient has been tollerating the BP meds without any issues,also monitors BP at home. No tunnel vision, no cough, no changes in vision, no lightheadedness, no fatigue, no syncopal or presyncopal episodes, no edema, no new rashes. Patient also  denied chest pain, headache, change in vision, dyspnea on exertion, shortness of breath, PND, back pain, numbness or tingling anywhere. He is tolerating his medication well, he denies any lightheadedness, no tunnel vision, no syncopal episodes, no fatigue, no leg weakness no falls.    GERD (gastroesophageal reflux disease)  Omeprazole 20mg     Patient denies any difficulty swallowing, no regurgitation, no eructation, no weight loss, no nocturnal asthma no food getting stuck in the chest.      Screening for colon cancer  Sees Dr. JACKSON  Q5 years  Mother and father with colon cancer    Hypokalemia    kcl 20meq daily      Preventative health care  Patient is a 63-year-old female who presents to clinic to [facility]. She has a significant past medical history of hypokalemia, hypertension, decreased libido, GERD.   The patient denied any chest pain, no sob, no jordan, no  pnd, no orthopnea, no headache, no changes in vision, no numbness or tingling, no  nausea, no diarrhea, no abdominal pain, no fevers, no chills, no bright red blood per rectum, no  difficulty urinating, no burning during micturition, no depressed mood, no other concerns.      Inflamed seborrheic keratosis  Patient states she has irritating barnacles on her back which flaked scab up and bleed when she scratches them.    Current medicines (including changes today)  Current Outpatient Prescriptions   Medication Sig Dispense Refill   • aspirin (ASA) 81 MG Chew Tab chewable tablet Take 1 Tab by mouth every day. 360 Tab 1   • Misc. Devices Misc Please provide patient with shingles vaccine and 1 Application 1   • omeprazole (PRILOSEC) 20 MG delayed-release capsule Take 1 Cap by mouth 2 times a day. Indications: **OTC**     • esterified estrogen-methyltest (ESTRATEST) 1.25-2.5 MG Tab Take 1 Tab by mouth every day.     • Melatonin 5 MG Tab Take  by mouth.     • potassium chloride SA (K-DUR) 20 MEQ Tab CR Take 20 mEq by mouth every evening.     • valsartan-hydrochlorothiazide (DIOVAN HCT) 160-12.5 MG per tablet Take 1 Tab by mouth every day.     • multivitamin (THERAGRAN) TABS Take 1 Tab by mouth every day. Indications: **OTC**     • benzonatate (TESSALON) 100 MG Cap Take 2 Caps by mouth 3 times a day as needed for Cough. 30 Cap 1   • albuterol (PROAIR HFA) 108 (90 Base) MCG/ACT Aero Soln inhalation aerosol Inhale 2 Puffs by mouth every 6 hours as needed for Shortness of Breath. 8.5 g 3   • albuterol (VENTOLIN OR PROVENTIL) 108 (90 BASE) MCG/ACT Aero Soln inhalation aerosol Inhale 2 Puffs by mouth every 6 hours as needed for Shortness of Breath. 8.5 g 3   • diphenhydrAMINE (BENADRYL) 25 MG Tab Take 12.5 mg by mouth at bedtime as needed.  May repeat 1 time. for Sleep.     • docosahexanoic acid (OMEGA 3 FA) 1000 MG CAPS Take 1,000 mg by mouth 2 Times a Day. Indications: **OTC**       No current facility-administered medications for this visit.      She  has a past medical history of Hypertension. She also has  "no past medical history of Breast cancer (CMS-HCC).  She  has a past surgical history that includes other abdominal surgery; gyn surgery; and other.  Social History   Substance Use Topics   • Smoking status: Never Smoker   • Smokeless tobacco: Never Used   • Alcohol use Yes      Comment: occ     Social History     Social History Narrative   • No narrative on file     Family History   Problem Relation Age of Onset   • Cancer Paternal Grandmother      Family Status   Relation Status   • Paternal Grandmother          ROS    Please see hpi     All other systems reviewed and are negative     Objective:     Blood pressure 136/84, pulse 84, temperature 37.2 °C (99 °F), height 1.702 m (5' 7\"), weight 84.8 kg (187 lb), SpO2 92 %. Body mass index is 29.29 kg/m².  Physical Exam:    Constitutional: Alert, no distress.  Skin: Warm, dry, good turgor, no rashes in visible areas.  Eye: Equal, round and reactive, conjunctiva clear, lids normal.  ENMT: Lips without lesions, good dentition, oropharynx clear.  Neck: Trachea midline, no masses, no thyromegaly. No cervical or supraclavicular lymphadenopathy.  Respiratory: Unlabored respiratory effort, lungs clear to auscultation, no wheezes, no ronchi.  Cardiovascular: Normal S1, S2, no murmur, no edema.  Abdomen: Soft, non-tender, no masses, no hepatosplenomegaly.  Psych: Alert and oriented x3, normal affect and mood.      SKIN EXAM    Skin exam--reveals raised, pink,red dried hemmorhage, plaques on back      multiple lesions on bilateral forearms, hands, and chest, with evidence of of solar damage present , spotty hyperpigmentation, scattered telangiectasias, and  Xerosis      PROCEDURE: CRYOTHERAPY  Discussed risks and benefits of cryotherapy including but not limited to scarring, hyperpigmentation, hypopigmentation, hypertrophic scarring, keiloid scarring, incomplete or no resolution of lesions treated,pain, undesirable cosemetic result, blistering, potential need for additional " treatment including more invasive treatment. Patient expresses understanding and verbally acknowledges risks and consent to treatment. 2  applications of cryotherapy with 3 second freeze thaw cycle was applied to 3  AK's and  all irritated and inflamed Seborrheic Keratoses. Patient tolerated procedure well. There were no complications. Aftercare instructions given.            Assessment and Plan:   The following treatment plan was discussed      1. Essential hypertension    Patient has been stable with current management  We will make no changes for now    - CBC WITH DIFFERENTIAL; Future  - LIPID PROFILE; Future  - COMP METABOLIC PANEL; Future    2. Gastroesophageal reflux disease with esophagitis     Gerd precautions given (avoid the supine position after eating, avoid tight fitting clothing, head of bed elevation by raisin legs of bed,  avoid eating prior to sleeping, avoid reflux-inducing foods (foods high in fat, chocolate, peppermint, and excessive alcohol, which can decrease LES pressure), promote salivation by chewing gum or lozenges,    3. Screening for colon cancer  She states that she is up to date    - REFERRAL TO GI FOR COLONOSCOPY    4. Mitral valve disorder  resolved    5. Hypokalemia  Patient has been stable with current management  We will make no changes for now  Likely from hctz    6. Preventative health care  Care has been established  We need baseline labs to establish a clinical profile  We will then consider daily prophylactic aspirin   In order to weigh  the benefits vs risk of GI bleed    We reviewed USPSTF guidelines    This patient is due for mammogram  Up to date on colonoscopy   Requested Medical records to be sent to us  Denies intimate partner viloence        7. Decreased libido  Using HRT from her gynecologist    8. Need for vaccination  Given today    - TDAP VACCINE =>6YO IM    9. ISK    Patient tollerrated procedure well  There were no adverse events  Patient was given post procedure  precautions     10. AK    Patient tollerrated procedure well  There were no adverse events  Patient was given post procedure precautions       HEALTH MAINTENANCE:    Instructed to Follow up in clinic or ER for worsening symptoms, difficulty breathing, lack of expected recovery, or should new symptoms or problems arise.    Followup: Return in about 6 months (around 9/2/2018) for Reevaluation.       Once again this medical record contains text that has been entered with the assistance of computer voice recognition and dictation software.  Therefore, it may contain unintended errors in text, spelling, punctuation, or grammar

## 2018-03-05 ENCOUNTER — HOSPITAL ENCOUNTER (OUTPATIENT)
Dept: LAB | Facility: MEDICAL CENTER | Age: 63
End: 2018-03-05
Attending: FAMILY MEDICINE
Payer: COMMERCIAL

## 2018-03-05 DIAGNOSIS — I10 ESSENTIAL HYPERTENSION: ICD-10-CM

## 2018-03-05 LAB
ALBUMIN SERPL BCP-MCNC: 4.6 G/DL (ref 3.2–4.9)
ALBUMIN/GLOB SERPL: 1.6 G/DL
ALP SERPL-CCNC: 53 U/L (ref 30–99)
ALT SERPL-CCNC: 39 U/L (ref 2–50)
ANION GAP SERPL CALC-SCNC: 7 MMOL/L (ref 0–11.9)
AST SERPL-CCNC: 46 U/L (ref 12–45)
BASOPHILS # BLD AUTO: 1 % (ref 0–1.8)
BASOPHILS # BLD: 0.08 K/UL (ref 0–0.12)
BILIRUB SERPL-MCNC: 0.3 MG/DL (ref 0.1–1.5)
BUN SERPL-MCNC: 21 MG/DL (ref 8–22)
CALCIUM SERPL-MCNC: 10.6 MG/DL (ref 8.5–10.5)
CHLORIDE SERPL-SCNC: 104 MMOL/L (ref 96–112)
CHOLEST SERPL-MCNC: 173 MG/DL (ref 100–199)
CO2 SERPL-SCNC: 26 MMOL/L (ref 20–33)
CREAT SERPL-MCNC: 0.94 MG/DL (ref 0.5–1.4)
EOSINOPHIL # BLD AUTO: 0.24 K/UL (ref 0–0.51)
EOSINOPHIL NFR BLD: 3.1 % (ref 0–6.9)
ERYTHROCYTE [DISTWIDTH] IN BLOOD BY AUTOMATED COUNT: 45.4 FL (ref 35.9–50)
GLOBULIN SER CALC-MCNC: 2.8 G/DL (ref 1.9–3.5)
GLUCOSE SERPL-MCNC: 73 MG/DL (ref 65–99)
HCT VFR BLD AUTO: 45.3 % (ref 37–47)
HDLC SERPL-MCNC: 87 MG/DL
HGB BLD-MCNC: 15.1 G/DL (ref 12–16)
IMM GRANULOCYTES # BLD AUTO: 0.03 K/UL (ref 0–0.11)
IMM GRANULOCYTES NFR BLD AUTO: 0.4 % (ref 0–0.9)
LDLC SERPL CALC-MCNC: 72 MG/DL
LYMPHOCYTES # BLD AUTO: 2.04 K/UL (ref 1–4.8)
LYMPHOCYTES NFR BLD: 26.6 % (ref 22–41)
MCH RBC QN AUTO: 31.4 PG (ref 27–33)
MCHC RBC AUTO-ENTMCNC: 33.3 G/DL (ref 33.6–35)
MCV RBC AUTO: 94.2 FL (ref 81.4–97.8)
MONOCYTES # BLD AUTO: 0.72 K/UL (ref 0–0.85)
MONOCYTES NFR BLD AUTO: 9.4 % (ref 0–13.4)
NEUTROPHILS # BLD AUTO: 4.57 K/UL (ref 2–7.15)
NEUTROPHILS NFR BLD: 59.5 % (ref 44–72)
NRBC # BLD AUTO: 0 K/UL
NRBC BLD-RTO: 0 /100 WBC
PLATELET # BLD AUTO: 295 K/UL (ref 164–446)
PMV BLD AUTO: 9.3 FL (ref 9–12.9)
POTASSIUM SERPL-SCNC: 4.5 MMOL/L (ref 3.6–5.5)
PROT SERPL-MCNC: 7.4 G/DL (ref 6–8.2)
RBC # BLD AUTO: 4.81 M/UL (ref 4.2–5.4)
SODIUM SERPL-SCNC: 137 MMOL/L (ref 135–145)
TRIGL SERPL-MCNC: 71 MG/DL (ref 0–149)
WBC # BLD AUTO: 7.7 K/UL (ref 4.8–10.8)

## 2018-03-05 PROCEDURE — 80053 COMPREHEN METABOLIC PANEL: CPT

## 2018-03-05 PROCEDURE — 36415 COLL VENOUS BLD VENIPUNCTURE: CPT

## 2018-03-05 PROCEDURE — 85025 COMPLETE CBC W/AUTO DIFF WBC: CPT

## 2018-03-05 PROCEDURE — 80061 LIPID PANEL: CPT

## 2018-03-06 DIAGNOSIS — E83.52 HYPERCALCEMIA: ICD-10-CM

## 2018-07-25 DIAGNOSIS — I10 HYPERTENSION, UNSPECIFIED TYPE: ICD-10-CM

## 2018-07-25 RX ORDER — VALSARTAN AND HYDROCHLOROTHIAZIDE 160; 12.5 MG/1; MG/1
1 TABLET, FILM COATED ORAL DAILY
Qty: 90 TAB | Refills: 0 | Status: SHIPPED | OUTPATIENT
Start: 2018-07-25 | End: 2018-10-08 | Stop reason: SDUPTHER

## 2018-07-25 NOTE — TELEPHONE ENCOUNTER
Was the patient seen in the last year in this department? Yes     Does patient have an active prescription for medications requested? Yes     Received Request Via: Patient    Valsartan/HCTZ has been recalled- Pt is requesting Diovan

## 2018-09-05 ENCOUNTER — HOSPITAL ENCOUNTER (OUTPATIENT)
Dept: LAB | Facility: MEDICAL CENTER | Age: 63
End: 2018-09-05
Attending: FAMILY MEDICINE
Payer: COMMERCIAL

## 2018-09-05 ENCOUNTER — OFFICE VISIT (OUTPATIENT)
Dept: MEDICAL GROUP | Age: 63
End: 2018-09-05
Payer: COMMERCIAL

## 2018-09-05 VITALS
DIASTOLIC BLOOD PRESSURE: 70 MMHG | WEIGHT: 176 LBS | BODY MASS INDEX: 27.62 KG/M2 | TEMPERATURE: 97.7 F | SYSTOLIC BLOOD PRESSURE: 108 MMHG | HEART RATE: 75 BPM | OXYGEN SATURATION: 95 % | HEIGHT: 67 IN

## 2018-09-05 DIAGNOSIS — E83.52 HYPERCALCEMIA: ICD-10-CM

## 2018-09-05 DIAGNOSIS — E55.9 VITAMIN D DEFICIENCY: ICD-10-CM

## 2018-09-05 DIAGNOSIS — I10 ESSENTIAL HYPERTENSION: ICD-10-CM

## 2018-09-05 DIAGNOSIS — Z23 NEED FOR VACCINATION: ICD-10-CM

## 2018-09-05 DIAGNOSIS — R68.82 DECREASED LIBIDO: ICD-10-CM

## 2018-09-05 LAB
25(OH)D3 SERPL-MCNC: 66 NG/ML (ref 30–100)
CALCIUM SERPL-MCNC: 10.4 MG/DL (ref 8.5–10.5)
PTH-INTACT SERPL-MCNC: 20.8 PG/ML (ref 14–72)

## 2018-09-05 PROCEDURE — 90471 IMMUNIZATION ADMIN: CPT | Performed by: FAMILY MEDICINE

## 2018-09-05 PROCEDURE — 83970 ASSAY OF PARATHORMONE: CPT

## 2018-09-05 PROCEDURE — 36415 COLL VENOUS BLD VENIPUNCTURE: CPT

## 2018-09-05 PROCEDURE — 82306 VITAMIN D 25 HYDROXY: CPT

## 2018-09-05 PROCEDURE — 99214 OFFICE O/P EST MOD 30 MIN: CPT | Mod: 25 | Performed by: FAMILY MEDICINE

## 2018-09-05 PROCEDURE — 90686 IIV4 VACC NO PRSV 0.5 ML IM: CPT | Performed by: FAMILY MEDICINE

## 2018-09-05 NOTE — ASSESSMENT & PLAN NOTE
Patient has been compliant with her Diovan 160/12.5 mg p.o. Daily    The patient has been tollerating the BP meds without any issues. No tunnel vision, no cough, no changes in vision, no lightheadedness, no fatigue, no syncopal or presyncopal episodes, no edema, no new rashes.     The patient also denies chest pain, no dyspnea on exertion, no headaches, no numbness or tingling, no changes in vision, no weakness in any extremity, no back pain no changes in micturition.

## 2018-09-05 NOTE — PROGRESS NOTES
This medical record contains text that has been entered with the assistance of computer voice recognition and dictation software.  Therefore, it may contain unintended errors in text, spelling, punctuation, or grammar    Chief Complaint   Patient presents with   • Results     lab results         Keisha Lyle is a 63 y.o. female here evaluation and management of: lab results, htn,       HPI:     HTN (hypertension)  Patient has been compliant with her Diovan 160/12.5 mg p.o. Daily    The patient has been tollerating the BP meds without any issues. No tunnel vision, no cough, no changes in vision, no lightheadedness, no fatigue, no syncopal or presyncopal episodes, no edema, no new rashes.     The patient also denies chest pain, no dyspnea on exertion, no headaches, no numbness or tingling, no changes in vision, no weakness in any extremity, no back pain no changes in micturition.        Hypercalcemia  NEW PROBLEM    Patient does take vitamin D supplementation 1000 units daily  She states that she has had elevated vitamin D in the past.  She denies any symptoms no nausea no vomiting no throat fullness no abdominal pain, no anxiety no depression no visual auditory hallucinations, no bone pain.    Decreased libido  Patient has been using Estratest for decreased libido and hot flashes.  She is receiving this from her gynecologist.  It has improved her vasomotor symptoms.    Current medicines (including changes today)  Current Outpatient Prescriptions   Medication Sig Dispense Refill   • vitamin D (CHOLECALCIFEROL) 1000 UNIT Tab Take 1,000 Units by mouth every day.     • valsartan-hydrochlorothiazide (DIOVAN HCT) 160-12.5 MG per tablet Take 1 Tab by mouth every day. 90 Tab 0   • aspirin (ASA) 81 MG Chew Tab chewable tablet Take 1 Tab by mouth every day. 360 Tab 1   • omeprazole (PRILOSEC) 20 MG delayed-release capsule Take 1 Cap by mouth 2 times a day. Indications: **OTC**     • esterified estrogen-methyltest  "(ESTRATEST) 1.25-2.5 MG Tab Take 1 Tab by mouth every day.     • potassium chloride SA (K-DUR) 20 MEQ Tab CR Take 20 mEq by mouth every evening.     • multivitamin (THERAGRAN) TABS Take 1 Tab by mouth every day. Indications: **OTC**     • docosahexanoic acid (OMEGA 3 FA) 1000 MG CAPS Take 1,000 mg by mouth 2 Times a Day. Indications: **OTC**     • Misc. Devices Misc Please provide patient with shingles vaccine and 1 Application 1   • albuterol (PROAIR HFA) 108 (90 Base) MCG/ACT Aero Soln inhalation aerosol Inhale 2 Puffs by mouth every 6 hours as needed for Shortness of Breath. 8.5 g 3   • albuterol (VENTOLIN OR PROVENTIL) 108 (90 BASE) MCG/ACT Aero Soln inhalation aerosol Inhale 2 Puffs by mouth every 6 hours as needed for Shortness of Breath. 8.5 g 3     No current facility-administered medications for this visit.      She  has a past medical history of Hypertension. She also has no past medical history of Breast cancer (HCC).  She  has a past surgical history that includes other abdominal surgery; gyn surgery; and other.  Social History   Substance Use Topics   • Smoking status: Never Smoker   • Smokeless tobacco: Never Used   • Alcohol use Yes      Comment: occ     Social History     Social History Narrative   • No narrative on file     Family History   Problem Relation Age of Onset   • Cancer Paternal Grandmother      Family Status   Relation Status   • PGMo (Not Specified)         ROS    Please see hpi     All other systems reviewed and are negative     Objective:     Blood pressure 108/70, pulse 75, temperature 36.5 °C (97.7 °F), height 1.702 m (5' 7\"), weight 79.8 kg (176 lb), SpO2 95 %, not currently breastfeeding. Body mass index is 27.57 kg/m².  Physical Exam:    Constitutional: Alert, no distress.  Skin: Warm, dry, good turgor, no rashes in visible areas.  Eye: Equal, round and reactive, conjunctiva clear, lids normal.  ENMT: Lips without lesions, good dentition, oropharynx clear.  Neck: Trachea midline, " no masses, no thyromegaly. No cervical or supraclavicular lymphadenopathy.  Respiratory: Unlabored respiratory effort, lungs clear to auscultation, no wheezes, no ronchi.  Cardiovascular: Normal S1, S2, no murmur, no edema.  Abdomen: Soft, non-tender, no masses, no hepatosplenomegaly.  Psych: Alert and oriented x3, normal affect and mood.          Assessment and Plan:   The following treatment plan was discussed      1. Need for vaccination    Vaccine was administered today without adverse event.    - INFLUENZA VACCINE QUAD INJ >3Y(PF)    2. Hypercalcemia  Obtain PTH    - PTH INTACT W/CA    3. Vitamin D deficiency    Need baseline labs    - VITAMIN D,25 HYDROXY; Future    4. Essential hypertension  Patient has been stable with current management  We will make no changes for now      5. Decreased libido  Patient has been stable with current management  We will make no changes for now          HEALTH MAINTENANCE:    Instructed to Follow up in clinic or ER for worsening symptoms, difficulty breathing, lack of expected recovery, or should new symptoms or problems arise.    Followup: Return in about 1 year (around 9/5/2019) for Reevaluation, labs.       Once again this medical record contains text that has been entered with the assistance of computer voice recognition and dictation software.  Therefore, it may contain unintended errors in text, spelling, punctuation, or grammar

## 2018-09-05 NOTE — ASSESSMENT & PLAN NOTE
NEW PROBLEM    Patient does take vitamin D supplementation 1000 units daily  She states that she has had elevated vitamin D in the past.  She denies any symptoms no nausea no vomiting no throat fullness no abdominal pain, no anxiety no depression no visual auditory hallucinations, no bone pain.

## 2018-09-05 NOTE — ASSESSMENT & PLAN NOTE
Patient has been using Estratest for decreased libido and hot flashes.  She is receiving this from her gynecologist.  It has improved her vasomotor symptoms.

## 2018-10-03 ENCOUNTER — PATIENT MESSAGE (OUTPATIENT)
Dept: MEDICAL GROUP | Age: 63
End: 2018-10-03

## 2018-10-03 DIAGNOSIS — H81.10 BPPV (BENIGN PAROXYSMAL POSITIONAL VERTIGO), UNSPECIFIED LATERALITY: ICD-10-CM

## 2018-10-08 DIAGNOSIS — I10 HYPERTENSION, UNSPECIFIED TYPE: ICD-10-CM

## 2018-10-09 RX ORDER — ONDANSETRON HYDROCHLORIDE 8 MG/1
8 TABLET, FILM COATED ORAL EVERY 8 HOURS PRN
Qty: 15 TAB | Refills: 0 | Status: SHIPPED | OUTPATIENT
Start: 2018-10-09 | End: 2018-11-12 | Stop reason: SDUPTHER

## 2018-10-09 RX ORDER — VALSARTAN AND HYDROCHLOROTHIAZIDE 160; 12.5 MG/1; MG/1
TABLET, FILM COATED ORAL
Qty: 90 TAB | Refills: 0 | Status: SHIPPED | OUTPATIENT
Start: 2018-10-09 | End: 2019-01-05 | Stop reason: SDUPTHER

## 2018-11-12 DIAGNOSIS — H81.10 BPPV (BENIGN PAROXYSMAL POSITIONAL VERTIGO), UNSPECIFIED LATERALITY: ICD-10-CM

## 2018-11-13 RX ORDER — ONDANSETRON HYDROCHLORIDE 8 MG/1
TABLET, FILM COATED ORAL
Qty: 15 TAB | Refills: 0 | Status: SHIPPED | OUTPATIENT
Start: 2018-11-13 | End: 2019-10-08 | Stop reason: SDUPTHER

## 2019-01-05 DIAGNOSIS — I10 HYPERTENSION, UNSPECIFIED TYPE: ICD-10-CM

## 2019-01-08 RX ORDER — VALSARTAN AND HYDROCHLOROTHIAZIDE 160; 12.5 MG/1; MG/1
TABLET, FILM COATED ORAL
Qty: 90 TAB | Refills: 0 | Status: SHIPPED | OUTPATIENT
Start: 2019-01-08 | End: 2019-04-11 | Stop reason: SDUPTHER

## 2019-01-31 ENCOUNTER — HOSPITAL ENCOUNTER (OUTPATIENT)
Dept: RADIOLOGY | Facility: MEDICAL CENTER | Age: 64
End: 2019-01-31
Attending: FAMILY MEDICINE
Payer: COMMERCIAL

## 2019-01-31 DIAGNOSIS — Z12.31 VISIT FOR SCREENING MAMMOGRAM: ICD-10-CM

## 2019-01-31 PROCEDURE — 77063 BREAST TOMOSYNTHESIS BI: CPT

## 2019-02-27 DIAGNOSIS — E87.6 HYPOKALEMIA: ICD-10-CM

## 2019-02-27 RX ORDER — POTASSIUM CHLORIDE 750 MG/1
TABLET, EXTENDED RELEASE ORAL
Qty: 90 TAB | Refills: 3 | Status: SHIPPED | OUTPATIENT
Start: 2019-02-27 | End: 2020-03-16

## 2019-03-06 ENCOUNTER — TELEPHONE (OUTPATIENT)
Dept: MEDICAL GROUP | Age: 64
End: 2019-03-06

## 2019-03-06 DIAGNOSIS — Z78.0 POSTMENOPAUSAL: ICD-10-CM

## 2019-03-06 NOTE — TELEPHONE ENCOUNTER
2. SPECIFIC Action To Be Taken: Referral pending, please sign.    3. Diagnosis/Clinical Reason for Request: Postmenopausal     4. Specialty & Provider Name/Lab/Imaging Location: [facility]      5. Is appointment scheduled for requested order/referral: no    Patient was informed they will receive a return phone call from the office ONLY if there are any questions before processing their request. Advised to call back if they haven't received a call from the referral department in 5 days.

## 2019-04-03 ENCOUNTER — OFFICE VISIT (OUTPATIENT)
Dept: MEDICAL GROUP | Age: 64
End: 2019-04-03
Payer: COMMERCIAL

## 2019-04-03 VITALS
OXYGEN SATURATION: 95 % | SYSTOLIC BLOOD PRESSURE: 116 MMHG | HEART RATE: 81 BPM | DIASTOLIC BLOOD PRESSURE: 90 MMHG | TEMPERATURE: 98.1 F | BODY MASS INDEX: 27.75 KG/M2 | WEIGHT: 176.8 LBS | HEIGHT: 67 IN

## 2019-04-03 DIAGNOSIS — M72.0 DUPUYTREN'S DISEASE: ICD-10-CM

## 2019-04-03 DIAGNOSIS — I10 ESSENTIAL HYPERTENSION: ICD-10-CM

## 2019-04-03 DIAGNOSIS — H93.13 TINNITUS OF BOTH EARS: ICD-10-CM

## 2019-04-03 PROCEDURE — 99214 OFFICE O/P EST MOD 30 MIN: CPT | Performed by: FAMILY MEDICINE

## 2019-04-03 RX ORDER — ASCORBIC ACID 1500 MG
TABLET, EXTENDED RELEASE ORAL
COMMUNITY

## 2019-04-03 ASSESSMENT — PATIENT HEALTH QUESTIONNAIRE - PHQ9: CLINICAL INTERPRETATION OF PHQ2 SCORE: 0

## 2019-04-03 NOTE — PROGRESS NOTES
This medical record contains text that has been entered with the assistance of computer voice recognition and dictation software.  Therefore, it may contain unintended errors in text, spelling, punctuation, or grammar    Chief Complaint   Patient presents with   • Other     knots in hands x3-4 months   • Ringing in Ear         Keisha Lyle is a 64 y.o. female here evaluation and management of: hand knots      HPI:     Dupuytren's disease  NEW PROBLEM    The patient is a 65 y/o female who presents to clinic with a chief complaint of bilateral firm nodular growth on the palmar aspect of her hands.  Patient denies any joint stiffness no new rashes no fevers no chills no generalized malaise.  Patient denies any unintentional weight loss.   The patient denied any chest pain, no sob, no jordan, no  pnd, no orthopnea, no headache, no changes in vision, no numbness or tingling, no nausea, no diarrhea, no abdominal pain, no fevers, no chills, no bright red blood per rectum, no  difficulty urinating, no burning during micturition, no depressed mood, no other concerns.            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  She did wear the appropriate hearing protection equipment.  However she has noticed more ringing in the ear in the past year.  This is affecting both ears.  Denies any hearing loss, no changes in bowels no nausea no vomiting.    Current medicines (including changes today)  Current Outpatient Prescriptions   Medication Sig Dispense Refill   • Ascorbic Acid (VITAMIN C CR) 1500 MG Tab CR Take  by mouth.     • potassium chloride SA (K-DUR) 10 MEQ Tab CR TAKE 1 TAB BY MOUTH EVERY DAY FOR HYPERTENSION 90 Tab 3   • DIOVAN -12.5 MG per tablet TAKE 1 TABLET BY MOUTH EVERY DAY 90 Tab 0   • vitamin D (CHOLECALCIFEROL) 1000 UNIT Tab Take 1,000 Units by mouth every day.     • aspirin (ASA) 81 MG Chew Tab chewable tablet Take 1 Tab by mouth every  day. 360 Tab 1   • omeprazole (PRILOSEC) 20 MG delayed-release capsule Take 1 Cap by mouth 2 times a day. Indications: **OTC**     • esterified estrogen-methyltest (ESTRATEST) 1.25-2.5 MG Tab Take 1 Tab by mouth every day.     • multivitamin (THERAGRAN) TABS Take 1 Tab by mouth every day. Indications: **OTC**     • docosahexanoic acid (OMEGA 3 FA) 1000 MG CAPS Take 1,000 mg by mouth 2 Times a Day. Indications: **OTC**     • ondansetron (ZOFRAN) 8 MG Tab TAKE 1 TABLET BY MOUTH EVERY 8 HOURS AS NEEDED FOR NAUSEA/VOMITING. 15 Tab 0   • Misc. Devices Misc Please provide patient with shingles vaccine and 1 Application 1   • albuterol (PROAIR HFA) 108 (90 Base) MCG/ACT Aero Soln inhalation aerosol Inhale 2 Puffs by mouth every 6 hours as needed for Shortness of Breath. (Patient not taking: Reported on 4/3/2019) 8.5 g 3   • albuterol (VENTOLIN OR PROVENTIL) 108 (90 BASE) MCG/ACT Aero Soln inhalation aerosol Inhale 2 Puffs by mouth every 6 hours as needed for Shortness of Breath. (Patient not taking: Reported on 4/3/2019) 8.5 g 3   • potassium chloride SA (K-DUR) 20 MEQ Tab CR Take 20 mEq by mouth every evening.       No current facility-administered medications for this visit.      She  has a past medical history of Hypertension. She also has no past medical history of Breast cancer (HCC).  She  has a past surgical history that includes other abdominal surgery; gyn surgery; and other.  Social History   Substance Use Topics   • Smoking status: Never Smoker   • Smokeless tobacco: Never Used   • Alcohol use Yes      Comment: occ     Social History     Social History Narrative   • No narrative on file     Family History   Problem Relation Age of Onset   • Cancer Paternal Grandmother      Family Status   Relation Status   • PGMo (Not Specified)         ROS    Please see hpi     All other systems reviewed and are negative     Objective:      Body mass index is 27.69 kg/m².  Physical Exam:    Constitutional: Alert, no  distress.  Skin: Warm, dry, good turgor, no rashes in visible areas  Eye: Equal, round and reactive, conjunctiva clear, lids normal.  ENMT: Lips without lesions, good dentition, oropharynx clear.  Neck: Trachea midline, no masses, no thyromegaly. No cervical or supraclavicular lymphadenopathy.  Respiratory: Unlabored respiratory effort, lungs clear to auscultation, no wheezes, no ronchi.  Cardiovascular: Normal S1, S2, no murmur, no edema  Abdomen: Soft, non-tender, no masses, no hepatosplenomegaly.  Psych: Alert and oriented x3, normal affect and mood.  Hand--several raised nodules on bilateral palmar regions        Assessment and Plan:   The following treatment plan was discussed      1. Dupuytren's disease    This mild disease  This will likely benefit from conservative management  She can consider intralesional steroid injection with a hand surgeon    - REFERRAL TO HAND SURGERY    2. Essential hypertension    We will obtain new labs to update clinical profile.  Then we will adjust therapy as needed.    - Lipid Profile; Future  - CBC WITHOUT DIFFERENTIAL; Future  - Comp Metabolic Panel; Future    3. Tinnitus of both ears    Repeat hearing tests    - REFERRAL TO AUDIOLOGY        Instructed to Follow up in clinic or ER for worsening symptoms, difficulty breathing, lack of expected recovery, or should new symptoms or problems arise.    Followup: Return in about 3 months (around 7/3/2019) for labs, Reevaluation.       Once again this medical record contains text that has been entered with the assistance of computer voice recognition and dictation software.  Therefore, it may contain unintended errors in text, spelling, punctuation, or grammar

## 2019-04-03 NOTE — ASSESSMENT & PLAN NOTE
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  She did wear the appropriate hearing protection equipment.  However she has noticed more ringing in the ear in the past year.  This is affecting both ears.  Denies any hearing loss, no changes in bowels no nausea no vomiting.

## 2019-04-03 NOTE — ASSESSMENT & PLAN NOTE
NEW PROBLEM    The patient is a 63 y/o female who presents to clinic with a chief complaint of bilateral firm nodular growth on the palmar aspect of her hands.  Patient denies any joint stiffness no new rashes no fevers no chills no generalized malaise.  Patient denies any unintentional weight loss.   The patient denied any chest pain, no sob, no jordan, no  pnd, no orthopnea, no headache, no changes in vision, no numbness or tingling, no nausea, no diarrhea, no abdominal pain, no fevers, no chills, no bright red blood per rectum, no  difficulty urinating, no burning during micturition, no depressed mood, no other concerns.

## 2019-04-11 DIAGNOSIS — I10 HYPERTENSION, UNSPECIFIED TYPE: ICD-10-CM

## 2019-04-11 RX ORDER — VALSARTAN AND HYDROCHLOROTHIAZIDE 160; 12.5 MG/1; MG/1
TABLET, FILM COATED ORAL
Qty: 90 TAB | Refills: 0 | Status: SHIPPED | OUTPATIENT
Start: 2019-04-11 | End: 2019-07-12 | Stop reason: SDUPTHER

## 2019-04-17 DIAGNOSIS — J20.9 ACUTE BRONCHITIS, UNSPECIFIED ORGANISM: ICD-10-CM

## 2019-04-18 RX ORDER — ALBUTEROL SULFATE 90 UG/1
2 AEROSOL, METERED RESPIRATORY (INHALATION) EVERY 6 HOURS PRN
Qty: 8.5 G | Refills: 0 | Status: SHIPPED | OUTPATIENT
Start: 2019-04-18 | End: 2019-05-29 | Stop reason: SDUPTHER

## 2019-05-29 DIAGNOSIS — J20.9 ACUTE BRONCHITIS, UNSPECIFIED ORGANISM: ICD-10-CM

## 2019-05-29 RX ORDER — ALBUTEROL SULFATE 90 UG/1
2 AEROSOL, METERED RESPIRATORY (INHALATION) EVERY 6 HOURS PRN
Qty: 8.5 INHALER | Refills: 0 | Status: SHIPPED | OUTPATIENT
Start: 2019-05-29 | End: 2021-08-20

## 2019-06-04 DIAGNOSIS — Z91.89 OTHER SPECIFIED PERSONAL RISK FACTORS, NOT ELSEWHERE CLASSIFIED: ICD-10-CM

## 2019-06-12 ENCOUNTER — HOSPITAL ENCOUNTER (OUTPATIENT)
Dept: LAB | Facility: MEDICAL CENTER | Age: 64
End: 2019-06-12
Attending: FAMILY MEDICINE
Payer: COMMERCIAL

## 2019-06-12 ENCOUNTER — HOSPITAL ENCOUNTER (OUTPATIENT)
Dept: RADIOLOGY | Facility: MEDICAL CENTER | Age: 64
End: 2019-06-12
Attending: FAMILY MEDICINE

## 2019-06-12 DIAGNOSIS — I10 ESSENTIAL HYPERTENSION: ICD-10-CM

## 2019-06-12 DIAGNOSIS — Z91.89 OTHER SPECIFIED PERSONAL RISK FACTORS, NOT ELSEWHERE CLASSIFIED: ICD-10-CM

## 2019-06-12 LAB
ALBUMIN SERPL BCP-MCNC: 4.8 G/DL (ref 3.2–4.9)
ALBUMIN/GLOB SERPL: 1.7 G/DL
ALP SERPL-CCNC: 48 U/L (ref 30–99)
ALT SERPL-CCNC: 28 U/L (ref 2–50)
ANION GAP SERPL CALC-SCNC: 7 MMOL/L (ref 0–11.9)
AST SERPL-CCNC: 32 U/L (ref 12–45)
BILIRUB SERPL-MCNC: 0.6 MG/DL (ref 0.1–1.5)
BUN SERPL-MCNC: 18 MG/DL (ref 8–22)
CALCIUM SERPL-MCNC: 10.3 MG/DL (ref 8.5–10.5)
CHLORIDE SERPL-SCNC: 104 MMOL/L (ref 96–112)
CHOLEST SERPL-MCNC: 217 MG/DL (ref 100–199)
CO2 SERPL-SCNC: 29 MMOL/L (ref 20–33)
CREAT SERPL-MCNC: 0.79 MG/DL (ref 0.5–1.4)
ERYTHROCYTE [DISTWIDTH] IN BLOOD BY AUTOMATED COUNT: 44.2 FL (ref 35.9–50)
FASTING STATUS PATIENT QL REPORTED: NORMAL
GLOBULIN SER CALC-MCNC: 2.8 G/DL (ref 1.9–3.5)
GLUCOSE SERPL-MCNC: 86 MG/DL (ref 65–99)
HCT VFR BLD AUTO: 43.5 % (ref 37–47)
HDLC SERPL-MCNC: 93 MG/DL
HGB BLD-MCNC: 14.1 G/DL (ref 12–16)
LDLC SERPL CALC-MCNC: 114 MG/DL
MCH RBC QN AUTO: 30.9 PG (ref 27–33)
MCHC RBC AUTO-ENTMCNC: 32.4 G/DL (ref 33.6–35)
MCV RBC AUTO: 95.2 FL (ref 81.4–97.8)
PLATELET # BLD AUTO: 303 K/UL (ref 164–446)
PMV BLD AUTO: 9.3 FL (ref 9–12.9)
POTASSIUM SERPL-SCNC: 3.8 MMOL/L (ref 3.6–5.5)
PROT SERPL-MCNC: 7.6 G/DL (ref 6–8.2)
RBC # BLD AUTO: 4.57 M/UL (ref 4.2–5.4)
SODIUM SERPL-SCNC: 140 MMOL/L (ref 135–145)
TRIGL SERPL-MCNC: 52 MG/DL (ref 0–149)
WBC # BLD AUTO: 5.3 K/UL (ref 4.8–10.8)

## 2019-06-12 PROCEDURE — 4410556 CT-CARDIAC SCORING

## 2019-06-12 PROCEDURE — 36415 COLL VENOUS BLD VENIPUNCTURE: CPT

## 2019-06-12 PROCEDURE — 80053 COMPREHEN METABOLIC PANEL: CPT

## 2019-06-12 PROCEDURE — 85027 COMPLETE CBC AUTOMATED: CPT

## 2019-06-12 PROCEDURE — 80061 LIPID PANEL: CPT

## 2019-07-12 DIAGNOSIS — I10 HYPERTENSION, UNSPECIFIED TYPE: ICD-10-CM

## 2019-07-12 RX ORDER — VALSARTAN AND HYDROCHLOROTHIAZIDE 160; 12.5 MG/1; MG/1
TABLET, FILM COATED ORAL
Qty: 90 TAB | Refills: 0 | Status: SHIPPED | OUTPATIENT
Start: 2019-07-12 | End: 2019-10-01 | Stop reason: SDUPTHER

## 2019-07-16 ENCOUNTER — OFFICE VISIT (OUTPATIENT)
Dept: MEDICAL GROUP | Age: 64
End: 2019-07-16
Payer: COMMERCIAL

## 2019-07-16 VITALS
TEMPERATURE: 97.9 F | HEIGHT: 67 IN | WEIGHT: 181 LBS | DIASTOLIC BLOOD PRESSURE: 78 MMHG | HEART RATE: 68 BPM | OXYGEN SATURATION: 94 % | SYSTOLIC BLOOD PRESSURE: 132 MMHG | BODY MASS INDEX: 28.41 KG/M2

## 2019-07-16 DIAGNOSIS — Z23 NEED FOR VACCINATION: ICD-10-CM

## 2019-07-16 DIAGNOSIS — Z00.00 ANNUAL PHYSICAL EXAM: ICD-10-CM

## 2019-07-16 DIAGNOSIS — I10 ESSENTIAL HYPERTENSION: ICD-10-CM

## 2019-07-16 DIAGNOSIS — K21.00 GASTROESOPHAGEAL REFLUX DISEASE WITH ESOPHAGITIS: ICD-10-CM

## 2019-07-16 PROCEDURE — 90471 IMMUNIZATION ADMIN: CPT | Performed by: FAMILY MEDICINE

## 2019-07-16 PROCEDURE — 90632 HEPA VACCINE ADULT IM: CPT | Performed by: FAMILY MEDICINE

## 2019-07-16 PROCEDURE — 99396 PREV VISIT EST AGE 40-64: CPT | Mod: 25 | Performed by: FAMILY MEDICINE

## 2019-07-16 NOTE — PROGRESS NOTES
This medical record contains text that has been entered with the assistance of computer voice recognition and dictation software.  Therefore, it may contain unintended errors in text, spelling, punctuation, or grammar        Chief Complaint   Patient presents with   • Follow-Up     needs hep A vaccine          Keisha Lyle is a 64 y.o. female here evaluation and management of: annual      HPI:     1. Annual physical exam    The patient is a 65 y/o female with a significant past medical h/o HTN, GERD, Dupuytren's disease, actinic keratosis hypercalcemia, tinnitus who presents for her annual wellness visit. Today the patient denied any chest pain, no sob, no jordan, no  pnd, no orthopnea, no headache, no changes in vision, no numbness or tingling, no nausea, no diarrhea, no abdominal pain, no fevers, no chills, no bright red blood per rectum, no  difficulty urinating, no burning during micturition, no depressed mood, no other concerns.      2. Essential hypertension  Diovan /12.5 mg p.o. Daily    Home BP readings--- not monitoring    Today, the patient has been tollerating the BP meds without any issues. No tunnel vision, no cough, no changes in vision, no lightheadedness, no fatigue, no syncopal or presyncopal episodes, no edema, no new rashes.     The patient also denies chest pain, no dyspnea on exertion, no headaches, no numbness or tingling, no changes in vision, no weakness in any extremity, no back pain no changes in micturition.      3. Gastroesophageal reflux disease with esophagitis    The patient states that she has to take her omeprazole 20 mg twice daily if not she will have severe heartburn. Today, the patient denies any difficulty swallowing, no regurgitation, no eructation, no weight loss, no nocturnal asthma no food getting stuck in the chest.    4. Need for vaccination  The patient will be traveling to [place] and needs hepatitis A vaccination.        Current medicines (including changes  today)  Current Outpatient Prescriptions   Medication Sig Dispense Refill   • DIOVAN -12.5 MG per tablet TAKE 1 TABLET BY MOUTH EVERY DAY 90 Tab 0   • albuterol 108 (90 Base) MCG/ACT Aero Soln inhalation aerosol Inhale 2 Puffs by mouth every 6 hours as needed for Shortness of Breath. 8.5 Inhaler 0   • Ascorbic Acid (VITAMIN C CR) 1500 MG Tab CR Take  by mouth.     • potassium chloride SA (K-DUR) 10 MEQ Tab CR TAKE 1 TAB BY MOUTH EVERY DAY FOR HYPERTENSION 90 Tab 3   • vitamin D (CHOLECALCIFEROL) 1000 UNIT Tab Take 1,000 Units by mouth every day.     • aspirin (ASA) 81 MG Chew Tab chewable tablet Take 1 Tab by mouth every day. 360 Tab 1   • Misc. Devices Misc Please provide patient with shingles vaccine and 1 Application 1   • omeprazole (PRILOSEC) 20 MG delayed-release capsule Take 1 Cap by mouth 2 times a day. Indications: **OTC**     • esterified estrogen-methyltest (ESTRATEST) 1.25-2.5 MG Tab Take 1 Tab by mouth every day.     • multivitamin (THERAGRAN) TABS Take 1 Tab by mouth every day. Indications: **OTC**     • docosahexanoic acid (OMEGA 3 FA) 1000 MG CAPS Take 1,000 mg by mouth 2 Times a Day. Indications: **OTC**     • ondansetron (ZOFRAN) 8 MG Tab TAKE 1 TABLET BY MOUTH EVERY 8 HOURS AS NEEDED FOR NAUSEA/VOMITING. 15 Tab 0   • albuterol (VENTOLIN OR PROVENTIL) 108 (90 BASE) MCG/ACT Aero Soln inhalation aerosol Inhale 2 Puffs by mouth every 6 hours as needed for Shortness of Breath. (Patient not taking: Reported on 4/3/2019) 8.5 g 3   • potassium chloride SA (K-DUR) 20 MEQ Tab CR Take 20 mEq by mouth every evening.       No current facility-administered medications for this visit.      She  has a past medical history of Hypertension. She also has no past medical history of Breast cancer (HCC).  She  has a past surgical history that includes other abdominal surgery; gyn surgery; and other.  Social History   Substance Use Topics   • Smoking status: Never Smoker   • Smokeless tobacco: Never Used   •  "Alcohol use Yes      Comment: occ     Social History     Social History Narrative   • No narrative on file     Family History   Problem Relation Age of Onset   • Cancer Paternal Grandmother      Family Status   Relation Status   • PGMo (Not Specified)         ROS    The pertinent  ROS findings can be seen in the HPI above.     All other systems reviewed and are negative     Objective:     /78 (BP Location: Left arm, Patient Position: Sitting, BP Cuff Size: Adult)   Pulse 68   Temp 36.6 °C (97.9 °F) (Temporal)   Ht 1.702 m (5' 7\")   Wt 82.1 kg (181 lb)   SpO2 94%  Body mass index is 28.35 kg/m².      Physical Exam:    Constitutional: Alert, no distress.  Skin: no rashes  Eye: Equal, round and reactive, conjunctiva clear, lids normal.  ENMT: Lips without lesions, good dentition, oropharynx clear.  Neck: Trachea midline, no masses, no thyromegaly. No cervical or supraclavicular lymphadenopathy.  Respiratory: Unlabored respiratory effort, lungs clear to auscultation, no wheezes, no ronchi.  Cardiovascular: Normal S1, S2, no murmur, no edema  Abdomen: Soft, non-tender, no masses, no hepatosplenomegaly.  Psych: Alert and oriented x3, normal affect and mood.          Assessment and Plan:   The following treatment plan was discussed      1. Annual physical exam    Up to date on all HMI    The 10-year ASCVD risk score (Orleans DC Jr., et al., 2013) is: 5.2%      2. Essential hypertension  Patient has been stable with current management  We will make no changes for now      3. Gastroesophageal reflux disease with esophagitis  Patient has been stable with current management  We will make no changes for now      4. Need for vaccination  Vaccine was administered today without adverse event.    - Hepatitis A Vaccine Adult IM        Instructed to Follow up in clinic or ER for worsening symptoms, difficulty breathing, lack of expected recovery, or should new symptoms or problems arise.    Followup: Return in about 6 months " (around 1/16/2020) for Reevaluation.       Once again this medical record contains text that has been entered with the assistance of computer voice recognition and dictation software.  Therefore, it may contain unintended errors in text, spelling, punctuation, or grammar

## 2019-08-09 ENCOUNTER — OFFICE VISIT (OUTPATIENT)
Dept: URGENT CARE | Facility: CLINIC | Age: 64
End: 2019-08-09
Payer: COMMERCIAL

## 2019-08-09 ENCOUNTER — APPOINTMENT (OUTPATIENT)
Dept: RADIOLOGY | Facility: IMAGING CENTER | Age: 64
End: 2019-08-09
Attending: NURSE PRACTITIONER
Payer: COMMERCIAL

## 2019-08-09 VITALS
DIASTOLIC BLOOD PRESSURE: 70 MMHG | RESPIRATION RATE: 16 BRPM | OXYGEN SATURATION: 96 % | WEIGHT: 177 LBS | SYSTOLIC BLOOD PRESSURE: 124 MMHG | TEMPERATURE: 98.2 F | HEART RATE: 83 BPM | HEIGHT: 67 IN | BODY MASS INDEX: 27.78 KG/M2

## 2019-08-09 DIAGNOSIS — J22 LOWER RESP. TRACT INFECTION: ICD-10-CM

## 2019-08-09 DIAGNOSIS — J01.90 ACUTE BACTERIAL RHINOSINUSITIS: ICD-10-CM

## 2019-08-09 DIAGNOSIS — R05.9 COUGH: ICD-10-CM

## 2019-08-09 DIAGNOSIS — J20.9 BRONCHOSPASM WITH BRONCHITIS, ACUTE: ICD-10-CM

## 2019-08-09 DIAGNOSIS — B96.89 ACUTE BACTERIAL RHINOSINUSITIS: ICD-10-CM

## 2019-08-09 PROCEDURE — 99214 OFFICE O/P EST MOD 30 MIN: CPT | Performed by: NURSE PRACTITIONER

## 2019-08-09 PROCEDURE — 71046 X-RAY EXAM CHEST 2 VIEWS: CPT | Mod: TC | Performed by: NURSE PRACTITIONER

## 2019-08-09 RX ORDER — METHYLPREDNISOLONE 4 MG/1
4 TABLET ORAL DAILY
Qty: 1 KIT | Refills: 0 | Status: SHIPPED | OUTPATIENT
Start: 2019-08-09 | End: 2021-08-20

## 2019-08-09 RX ORDER — AMOXICILLIN 500 MG/1
500 CAPSULE ORAL 3 TIMES DAILY
Qty: 15 CAP | Refills: 0 | Status: SHIPPED | OUTPATIENT
Start: 2019-08-09 | End: 2019-08-14

## 2019-08-09 RX ORDER — ALBUTEROL SULFATE 90 UG/1
2 AEROSOL, METERED RESPIRATORY (INHALATION) EVERY 4 HOURS PRN
Qty: 1 INHALER | Refills: 0 | Status: SHIPPED | OUTPATIENT
Start: 2019-08-09 | End: 2021-08-20

## 2019-08-09 RX ORDER — BENZONATATE 100 MG/1
100 CAPSULE ORAL 3 TIMES DAILY PRN
Qty: 60 CAP | Refills: 0 | Status: SHIPPED | OUTPATIENT
Start: 2019-08-09 | End: 2021-08-20

## 2019-08-09 ASSESSMENT — ENCOUNTER SYMPTOMS
FEVER: 1
HEMOPTYSIS: 0
STRIDOR: 0
SPUTUM PRODUCTION: 1
COUGH: 1
SINUS PAIN: 1
SHORTNESS OF BREATH: 0
WHEEZING: 1
SORE THROAT: 1

## 2019-08-09 ASSESSMENT — COPD QUESTIONNAIRES: COPD: 0

## 2019-08-09 NOTE — PATIENT INSTRUCTIONS

## 2019-08-09 NOTE — PROGRESS NOTES
Subjective:     Keisha Lyle is a 64 y.o. female who presents for Bronchitis (coughing and congested, sore throat, nasal discharge started a week ago)      Bronchitis 3 times in a year, last time in April. Symptoms since sat. Green cough. Fever Sunday, 100.4. Taking ibuprofen for body aches. Mucinex D and albuterol inhaler. Non-smoker. Takes Nasocort for allergies.     Cough   This is a recurrent problem. The problem has been gradually worsening. The cough is productive of purulent sputum. Associated symptoms include ear congestion, a fever, nasal congestion, postnasal drip, a sore throat and wheezing. Pertinent negatives include no chest pain, ear pain, hemoptysis or shortness of breath. The treatment provided moderate relief. Her past medical history is significant for bronchitis and environmental allergies. There is no history of asthma, COPD, emphysema or pneumonia.       Past Medical History:   Diagnosis Date   • Hypertension     medicated       Past Surgical History:   Procedure Laterality Date   • GYN SURGERY      hysterectomy   • OTHER      tonsillectomy   • OTHER ABDOMINAL SURGERY      appy       Social History     Socioeconomic History   • Marital status:      Spouse name: Not on file   • Number of children: Not on file   • Years of education: Not on file   • Highest education level: Not on file   Occupational History   • Not on file   Social Needs   • Financial resource strain: Not on file   • Food insecurity:     Worry: Not on file     Inability: Not on file   • Transportation needs:     Medical: Not on file     Non-medical: Not on file   Tobacco Use   • Smoking status: Never Smoker   • Smokeless tobacco: Never Used   Substance and Sexual Activity   • Alcohol use: Yes     Comment: occ   • Drug use: No   • Sexual activity: Not on file   Lifestyle   • Physical activity:     Days per week: Not on file     Minutes per session: Not on file   • Stress: Not on file   Relationships   • Social  "connections:     Talks on phone: Not on file     Gets together: Not on file     Attends [employer] service: Not on file     Active member of club or organization: Not on file     Attends meetings of clubs or organizations: Not on file     Relationship status: Not on file   • Intimate partner violence:     Fear of current or ex partner: Not on file     Emotionally abused: Not on file     Physically abused: Not on file     Forced sexual activity: Not on file   Other Topics Concern   • Not on file   Social History Narrative   • Not on file        Family History   Problem Relation Age of Onset   • Cancer Paternal Grandmother         Allergies   Allergen Reactions   • Codeine Hives   • Erythromycin Hives and Vomiting     Rxn = unknown - years ago    • Inapsine [Droperidol]      agitated       Review of Systems   Constitutional: Positive for fever.   HENT: Positive for congestion, postnasal drip, sinus pain and sore throat. Negative for ear discharge, ear pain and nosebleeds.    Respiratory: Positive for cough, sputum production and wheezing. Negative for hemoptysis, shortness of breath and stridor.    Cardiovascular: Negative for chest pain.   Endo/Heme/Allergies: Positive for environmental allergies.   All other systems reviewed and are negative.       Objective:   /70   Pulse 83   Temp 36.8 °C (98.2 °F)   Resp 16   Ht 1.702 m (5' 7\")   Wt 80.3 kg (177 lb)   SpO2 96%   BMI 27.72 kg/m²     Physical Exam   Constitutional: She is oriented to person, place, and time. She appears well-developed and well-nourished.   HENT:   Head: Normocephalic and atraumatic.   Right Ear: Hearing, external ear and ear canal normal. No mastoid tenderness. Tympanic membrane is not perforated, not retracted and not bulging. A middle ear effusion is present. No hemotympanum.   Left Ear: Hearing, tympanic membrane, external ear and ear canal normal.   Nose: Mucosal edema present. Right sinus exhibits maxillary sinus tenderness. Right " sinus exhibits no frontal sinus tenderness. Left sinus exhibits no maxillary sinus tenderness and no frontal sinus tenderness.   Mouth/Throat: Uvula is midline and mucous membranes are normal. No posterior oropharyngeal edema or posterior oropharyngeal erythema. No tonsillar exudate.   Clear post nasal drip.   Eyes: Pupils are equal, round, and reactive to light. Conjunctivae are normal.   Neck: Normal range of motion. Neck supple.   Cardiovascular: Normal rate and normal heart sounds.   Pulmonary/Chest: No accessory muscle usage or stridor. No tachypnea. No respiratory distress. She has no decreased breath sounds. She has wheezes in the right lower field. She has no rhonchi. She has no rales. She exhibits no tenderness.   Productive cough noted.    Musculoskeletal: Normal range of motion.   Lymphadenopathy:     She has no cervical adenopathy.   Neurological: She is alert and oriented to person, place, and time. She has normal strength. No sensory deficit. GCS eye subscore is 4. GCS verbal subscore is 5. GCS motor subscore is 6.   Skin: Skin is warm and dry. No rash noted.   Psychiatric: She has a normal mood and affect. Her speech is normal and behavior is normal. Judgment and thought content normal.   Vitals reviewed.      Assessment/Plan:   1. Acute bacterial rhinosinusitis  - amoxicillin (AMOXIL) 500 MG Cap; Take 1 Cap by mouth 3 times a day for 5 days.  Dispense: 15 Cap; Refill: 0    2. Lower resp. tract infection  - benzonatate (TESSALON) 100 MG Cap; Take 1 Cap by mouth 3 times a day as needed for Cough.  Dispense: 60 Cap; Refill: 0  - albuterol 108 (90 Base) MCG/ACT Aero Soln inhalation aerosol; Inhale 2 Puffs by mouth every four hours as needed for Shortness of Breath.  Dispense: 1 Inhaler; Refill: 0  - amoxicillin (AMOXIL) 500 MG Cap; Take 1 Cap by mouth 3 times a day for 5 days.  Dispense: 15 Cap; Refill: 0    3. Bronchospasm with bronchitis, acute  - albuterol 108 (90 Base) MCG/ACT Aero Soln inhalation  aerosol; Inhale 2 Puffs by mouth every four hours as needed for Shortness of Breath.  Dispense: 1 Inhaler; Refill: 0  1.  Unremarkable two view chest.    Other orders  - DX-CHEST-2 VIEWS; Future  Mucinex D  Nasocort    Follow up with PCP.    Follow up urgently for shortness of breath, chest pain, fevers, weakness, facial swelling, or any other conerens.     Differential diagnosis, natural history, supportive care, and indications for immediate follow-up discussed.

## 2019-10-01 DIAGNOSIS — I10 HYPERTENSION, UNSPECIFIED TYPE: ICD-10-CM

## 2019-10-01 RX ORDER — VALSARTAN AND HYDROCHLOROTHIAZIDE 160; 12.5 MG/1; MG/1
TABLET, FILM COATED ORAL
Qty: 90 TAB | Refills: 0 | Status: SHIPPED | OUTPATIENT
Start: 2019-10-01 | End: 2019-12-16

## 2019-12-06 ENCOUNTER — PATIENT MESSAGE (OUTPATIENT)
Dept: MEDICAL GROUP | Age: 64
End: 2019-12-06

## 2019-12-09 ENCOUNTER — PATIENT MESSAGE (OUTPATIENT)
Dept: MEDICAL GROUP | Age: 64
End: 2019-12-09

## 2020-03-14 DIAGNOSIS — E87.6 HYPOKALEMIA: ICD-10-CM

## 2020-03-18 DIAGNOSIS — I10 HYPERTENSION, UNSPECIFIED TYPE: ICD-10-CM

## 2020-03-18 RX ORDER — POTASSIUM CHLORIDE 750 MG/1
TABLET, EXTENDED RELEASE ORAL
Qty: 90 TAB | Refills: 0 | Status: SHIPPED | OUTPATIENT
Start: 2020-03-18 | End: 2020-06-04

## 2020-03-18 RX ORDER — VALSARTAN 160 MG/1
160 TABLET ORAL DAILY
Qty: 90 TAB | Refills: 1 | Status: SHIPPED | OUTPATIENT
Start: 2020-03-18 | End: 2020-08-28

## 2020-03-18 RX ORDER — HYDROCHLOROTHIAZIDE 12.5 MG/1
12.5 TABLET ORAL DAILY
Qty: 90 TAB | Refills: 1 | Status: SHIPPED | OUTPATIENT
Start: 2020-03-18 | End: 2020-08-28

## 2020-03-18 RX ORDER — VALSARTAN AND HYDROCHLOROTHIAZIDE 160; 12.5 MG/1; MG/1
1 TABLET, FILM COATED ORAL
Qty: 90 TAB | Refills: 2 | Status: CANCELLED | OUTPATIENT
Start: 2020-03-18

## 2020-06-03 DIAGNOSIS — E87.6 HYPOKALEMIA: ICD-10-CM

## 2020-06-04 RX ORDER — POTASSIUM CHLORIDE 750 MG/1
TABLET, EXTENDED RELEASE ORAL
Qty: 90 TAB | Refills: 0 | Status: SHIPPED | OUTPATIENT
Start: 2020-06-04 | End: 2020-08-27

## 2020-06-16 ENCOUNTER — TELEPHONE (OUTPATIENT)
Dept: MEDICAL GROUP | Age: 65
End: 2020-06-16

## 2020-06-16 DIAGNOSIS — Z00.00 ANNUAL PHYSICAL EXAM: ICD-10-CM

## 2020-06-16 NOTE — TELEPHONE ENCOUNTER
1. Caller Name: Keisha Lyle                          Call Back Number: [phone] (home)         How would the patient prefer to be contacted with a response: [email or web address]t message    2. SPECIFIC Action To Be Taken: requesting referral    3. Diagnosis/Clinical Reason for Request: wants to have a colonoscopy done    4. Specialty & Provider Name/Lab[facility]aging [facility]    5. Is appointment scheduled for requested order/referral: no    Patient was informed they will receive a return phone call from the office ONLY if there are any questions before processing their request. Advised to call back if they haven't received a call from the referral department in 5 days.

## 2020-06-17 DIAGNOSIS — Z12.11 SCREENING FOR COLORECTAL CANCER: ICD-10-CM

## 2020-06-17 DIAGNOSIS — Z12.12 SCREENING FOR COLORECTAL CANCER: ICD-10-CM

## 2020-06-24 ENCOUNTER — HOSPITAL ENCOUNTER (OUTPATIENT)
Dept: LAB | Facility: MEDICAL CENTER | Age: 65
End: 2020-06-24
Attending: FAMILY MEDICINE
Payer: COMMERCIAL

## 2020-06-24 DIAGNOSIS — Z00.00 ANNUAL PHYSICAL EXAM: ICD-10-CM

## 2020-06-24 LAB
25(OH)D3 SERPL-MCNC: 79 NG/ML (ref 30–100)
ALBUMIN SERPL BCP-MCNC: 4.6 G/DL (ref 3.2–4.9)
ALBUMIN/GLOB SERPL: 1.9 G/DL
ALP SERPL-CCNC: 61 U/L (ref 30–99)
ALT SERPL-CCNC: 40 U/L (ref 2–50)
ANION GAP SERPL CALC-SCNC: 19 MMOL/L (ref 7–16)
AST SERPL-CCNC: 42 U/L (ref 12–45)
BILIRUB SERPL-MCNC: 0.7 MG/DL (ref 0.1–1.5)
BUN SERPL-MCNC: 10 MG/DL (ref 8–22)
CALCIUM SERPL-MCNC: 9.7 MG/DL (ref 8.5–10.5)
CHLORIDE SERPL-SCNC: 98 MMOL/L (ref 96–112)
CHOLEST SERPL-MCNC: 182 MG/DL (ref 100–199)
CO2 SERPL-SCNC: 22 MMOL/L (ref 20–33)
CREAT SERPL-MCNC: 0.73 MG/DL (ref 0.5–1.4)
ERYTHROCYTE [DISTWIDTH] IN BLOOD BY AUTOMATED COUNT: 44.5 FL (ref 35.9–50)
FASTING STATUS PATIENT QL REPORTED: NORMAL
GLOBULIN SER CALC-MCNC: 2.4 G/DL (ref 1.9–3.5)
GLUCOSE SERPL-MCNC: 81 MG/DL (ref 65–99)
HCT VFR BLD AUTO: 44 % (ref 37–47)
HDLC SERPL-MCNC: 91 MG/DL
HGB BLD-MCNC: 14.7 G/DL (ref 12–16)
LDLC SERPL CALC-MCNC: 77 MG/DL
MCH RBC QN AUTO: 32.2 PG (ref 27–33)
MCHC RBC AUTO-ENTMCNC: 33.4 G/DL (ref 33.6–35)
MCV RBC AUTO: 96.5 FL (ref 81.4–97.8)
PLATELET # BLD AUTO: 306 K/UL (ref 164–446)
PMV BLD AUTO: 9.1 FL (ref 9–12.9)
POTASSIUM SERPL-SCNC: 3.8 MMOL/L (ref 3.6–5.5)
PROT SERPL-MCNC: 7 G/DL (ref 6–8.2)
RBC # BLD AUTO: 4.56 M/UL (ref 4.2–5.4)
SODIUM SERPL-SCNC: 139 MMOL/L (ref 135–145)
T3FREE SERPL-MCNC: 2.5 PG/ML (ref 2–4.4)
TRIGL SERPL-MCNC: 72 MG/DL (ref 0–149)
WBC # BLD AUTO: 7.2 K/UL (ref 4.8–10.8)

## 2020-06-24 PROCEDURE — 36415 COLL VENOUS BLD VENIPUNCTURE: CPT

## 2020-06-24 PROCEDURE — 85027 COMPLETE CBC AUTOMATED: CPT

## 2020-06-24 PROCEDURE — 80053 COMPREHEN METABOLIC PANEL: CPT

## 2020-06-24 PROCEDURE — 84481 FREE ASSAY (FT-3): CPT

## 2020-06-24 PROCEDURE — 80061 LIPID PANEL: CPT

## 2020-06-24 PROCEDURE — 82306 VITAMIN D 25 HYDROXY: CPT

## 2020-06-25 ENCOUNTER — HOSPITAL ENCOUNTER (OUTPATIENT)
Dept: RADIOLOGY | Facility: MEDICAL CENTER | Age: 65
End: 2020-06-25
Attending: FAMILY MEDICINE
Payer: COMMERCIAL

## 2020-06-25 DIAGNOSIS — Z12.31 VISIT FOR SCREENING MAMMOGRAM: ICD-10-CM

## 2020-06-25 PROCEDURE — 77067 SCR MAMMO BI INCL CAD: CPT

## 2020-08-27 DIAGNOSIS — E87.6 HYPOKALEMIA: ICD-10-CM

## 2020-08-27 RX ORDER — POTASSIUM CHLORIDE 750 MG/1
TABLET, EXTENDED RELEASE ORAL
Qty: 90 TAB | Refills: 0 | Status: SHIPPED | OUTPATIENT
Start: 2020-08-27 | End: 2020-12-22

## 2020-08-28 RX ORDER — HYDROCHLOROTHIAZIDE 12.5 MG/1
TABLET ORAL
Qty: 90 TAB | Refills: 1 | Status: SHIPPED | OUTPATIENT
Start: 2020-08-28 | End: 2021-03-29

## 2020-08-28 RX ORDER — VALSARTAN 160 MG/1
TABLET ORAL
Qty: 90 TAB | Refills: 1 | Status: SHIPPED | OUTPATIENT
Start: 2020-08-28 | End: 2021-01-19

## 2020-12-21 DIAGNOSIS — E87.6 HYPOKALEMIA: ICD-10-CM

## 2020-12-22 RX ORDER — POTASSIUM CHLORIDE 750 MG/1
TABLET, EXTENDED RELEASE ORAL
Qty: 30 TAB | Refills: 2 | Status: SHIPPED | OUTPATIENT
Start: 2020-12-22 | End: 2021-03-29

## 2021-01-19 RX ORDER — LOSARTAN POTASSIUM 50 MG/1
TABLET ORAL
Qty: 30 TAB | Refills: 0 | Status: SHIPPED | OUTPATIENT
Start: 2021-01-19 | End: 2021-02-08 | Stop reason: SDUPTHER

## 2021-02-01 ENCOUNTER — PATIENT MESSAGE (OUTPATIENT)
Dept: MEDICAL GROUP | Age: 66
End: 2021-02-01

## 2021-02-01 DIAGNOSIS — E87.6 HYPOKALEMIA: ICD-10-CM

## 2021-02-01 DIAGNOSIS — I10 HYPERTENSION, UNSPECIFIED TYPE: ICD-10-CM

## 2021-02-01 DIAGNOSIS — E03.9 HYPOTHYROIDISM, UNSPECIFIED TYPE: ICD-10-CM

## 2021-02-01 NOTE — PATIENT COMMUNICATION
Pt is confused about medication she should be taking please advise. Pt stating that she should be taking Valsartan 160mg for years and somehow it was changed to Losartan 50mg please advise on which one she should be taking.

## 2021-02-08 NOTE — PATIENT COMMUNICATION
Received request via: Patient    Was the patient seen in the last year in this department? Yes    Does the patient have an active prescription (recently filled or refills available) for medication(s) requested? PT IS REQUESTING REFILL SO SHE DOES NOT RUN OUT OF MEDICATION PT DOES HAVE APPT ON 2/16/21 BUT WILL BE OUT OF MEDICATION BEFORE THEN. PLEASE ADVISE.

## 2021-02-09 ENCOUNTER — HOSPITAL ENCOUNTER (OUTPATIENT)
Dept: LAB | Facility: MEDICAL CENTER | Age: 66
End: 2021-02-09
Attending: FAMILY MEDICINE
Payer: MEDICARE

## 2021-02-09 DIAGNOSIS — E03.9 HYPOTHYROIDISM, UNSPECIFIED TYPE: ICD-10-CM

## 2021-02-09 DIAGNOSIS — I10 HYPERTENSION, UNSPECIFIED TYPE: ICD-10-CM

## 2021-02-09 DIAGNOSIS — E87.6 HYPOKALEMIA: ICD-10-CM

## 2021-02-09 LAB
ALBUMIN SERPL BCP-MCNC: 4.9 G/DL (ref 3.2–4.9)
ALBUMIN/GLOB SERPL: 1.8 G/DL
ALP SERPL-CCNC: 54 U/L (ref 30–99)
ALT SERPL-CCNC: 28 U/L (ref 2–50)
ANION GAP SERPL CALC-SCNC: 14 MMOL/L (ref 7–16)
AST SERPL-CCNC: 35 U/L (ref 12–45)
BASOPHILS # BLD AUTO: 1 % (ref 0–1.8)
BASOPHILS # BLD: 0.06 K/UL (ref 0–0.12)
BILIRUB SERPL-MCNC: 0.8 MG/DL (ref 0.1–1.5)
BUN SERPL-MCNC: 18 MG/DL (ref 8–22)
CALCIUM SERPL-MCNC: 10.4 MG/DL (ref 8.5–10.5)
CHLORIDE SERPL-SCNC: 102 MMOL/L (ref 96–112)
CHOLEST SERPL-MCNC: 222 MG/DL (ref 100–199)
CO2 SERPL-SCNC: 25 MMOL/L (ref 20–33)
CREAT SERPL-MCNC: 0.96 MG/DL (ref 0.5–1.4)
EOSINOPHIL # BLD AUTO: 0.11 K/UL (ref 0–0.51)
EOSINOPHIL NFR BLD: 1.8 % (ref 0–6.9)
ERYTHROCYTE [DISTWIDTH] IN BLOOD BY AUTOMATED COUNT: 46 FL (ref 35.9–50)
GLOBULIN SER CALC-MCNC: 2.8 G/DL (ref 1.9–3.5)
GLUCOSE SERPL-MCNC: 84 MG/DL (ref 65–99)
HCT VFR BLD AUTO: 46.1 % (ref 37–47)
HDLC SERPL-MCNC: 121 MG/DL
HGB BLD-MCNC: 15.1 G/DL (ref 12–16)
IMM GRANULOCYTES # BLD AUTO: 0.02 K/UL (ref 0–0.11)
IMM GRANULOCYTES NFR BLD AUTO: 0.3 % (ref 0–0.9)
LDLC SERPL CALC-MCNC: 90 MG/DL
LYMPHOCYTES # BLD AUTO: 2.14 K/UL (ref 1–4.8)
LYMPHOCYTES NFR BLD: 35.1 % (ref 22–41)
MCH RBC QN AUTO: 32.2 PG (ref 27–33)
MCHC RBC AUTO-ENTMCNC: 32.8 G/DL (ref 33.6–35)
MCV RBC AUTO: 98.3 FL (ref 81.4–97.8)
MONOCYTES # BLD AUTO: 0.53 K/UL (ref 0–0.85)
MONOCYTES NFR BLD AUTO: 8.7 % (ref 0–13.4)
NEUTROPHILS # BLD AUTO: 3.24 K/UL (ref 2–7.15)
NEUTROPHILS NFR BLD: 53.1 % (ref 44–72)
NRBC # BLD AUTO: 0 K/UL
NRBC BLD-RTO: 0 /100 WBC
PLATELET # BLD AUTO: 274 K/UL (ref 164–446)
PMV BLD AUTO: 8.9 FL (ref 9–12.9)
POTASSIUM SERPL-SCNC: 4.1 MMOL/L (ref 3.6–5.5)
PROT SERPL-MCNC: 7.7 G/DL (ref 6–8.2)
RBC # BLD AUTO: 4.69 M/UL (ref 4.2–5.4)
SODIUM SERPL-SCNC: 141 MMOL/L (ref 135–145)
TRIGL SERPL-MCNC: 53 MG/DL (ref 0–149)
TSH SERPL DL<=0.005 MIU/L-ACNC: 1.57 UIU/ML (ref 0.38–5.33)
WBC # BLD AUTO: 6.1 K/UL (ref 4.8–10.8)

## 2021-02-09 PROCEDURE — 85025 COMPLETE CBC W/AUTO DIFF WBC: CPT

## 2021-02-09 PROCEDURE — 36415 COLL VENOUS BLD VENIPUNCTURE: CPT

## 2021-02-09 PROCEDURE — 80053 COMPREHEN METABOLIC PANEL: CPT

## 2021-02-09 PROCEDURE — 80061 LIPID PANEL: CPT

## 2021-02-09 PROCEDURE — 84443 ASSAY THYROID STIM HORMONE: CPT

## 2021-02-09 RX ORDER — LOSARTAN POTASSIUM 50 MG/1
TABLET ORAL
Qty: 30 TAB | Refills: 0 | Status: SHIPPED | OUTPATIENT
Start: 2021-02-09 | End: 2021-02-09

## 2021-02-09 RX ORDER — LOSARTAN POTASSIUM 50 MG/1
50 TABLET ORAL DAILY
Qty: 30 TAB | Refills: 0 | Status: SHIPPED | OUTPATIENT
Start: 2021-02-09 | End: 2021-03-05 | Stop reason: SDUPTHER

## 2021-02-12 NOTE — PROGRESS NOTES
ANNUAL WELLNESS VISIT PRE-VISIT PLANNING    1.  Reviewed notes from the last office visit: Yes    2.  If any orders were ordered or intended to be done prior to visit (i.e. 6 mos follow-up), do we have results/consult notes or has patient scheduled?        •  Labs - Labs ordered, completed on 2/9/2021 and results are in chart.  Note: If patient appointment is for lab review and patient did not complete labs, check with provider if OK to reschedule patient until labs completed.       •  Imaging - Imaging ordered, completed and results are in chart.       •  Referrals - Referral ordered, patient has NOT been seen.    3.  Immunizations were updated in Epic using Reconcile Outside Information activity? Yes       •  Is patient due for Tdap? NO       •  Is patient due for Shingrix? NO    4.  Patient is due for the following Health Maintenance Topics:   Health Maintenance Due   Topic Date Due   • Annual Wellness Visit  1955   • COLONOSCOPY  06/13/2020   • IMM INFLUENZA (1) 09/01/2020   • BONE DENSITY  02/11/2021   • IMM PNEUMOCOCCAL VACCINE: 65+ Years (1 of 1 - PPSV23) 02/11/2021       - Patient plans to schedule appointment for Colonoscopy/FIT and Dexa Scan.    5.  Reviewed/Updated the following with patient:       •   Preferred Pharmacy? Yes       •   Preferred Lab? Yes       •   Preferred Communication? Yes       •   Allergies? Yes       •   Medications? YES. Was Abstract Encounter opened and chart updated? YES       •   Social History? Yes       •   Family History (document living status of immediate family members and if + hx of  cancer, diabetes, hypertension, hyperlipidemia, heart attack, stroke) Yes    6.  Care Team Updated:       •   DME Company (gait device, O2, CPAP, etc.): N\A       •   Other Specialists (eye doctor, derm, GYN, cardiology, endo, etc): YES    7.  Patient was advised: “This is a free wellness visit. The provider will screen for medical conditions to help you stay healthy. If you have other  concerns to address you may be asked to discuss these at a separate visit or there may be an additional fee.”     8.  AHA (Puls[employer]) form printed for Provider? N/A

## 2021-02-16 ENCOUNTER — TELEMEDICINE (OUTPATIENT)
Dept: MEDICAL GROUP | Age: 66
End: 2021-02-16
Payer: MEDICARE

## 2021-02-16 VITALS
BODY MASS INDEX: 26.21 KG/M2 | HEIGHT: 67 IN | HEART RATE: 78 BPM | SYSTOLIC BLOOD PRESSURE: 124 MMHG | DIASTOLIC BLOOD PRESSURE: 80 MMHG | OXYGEN SATURATION: 97 % | TEMPERATURE: 97.6 F | WEIGHT: 167 LBS

## 2021-02-16 DIAGNOSIS — I10 ESSENTIAL HYPERTENSION: ICD-10-CM

## 2021-02-16 DIAGNOSIS — Z00.00 MEDICARE ANNUAL WELLNESS VISIT, INITIAL: ICD-10-CM

## 2021-02-16 DIAGNOSIS — Z78.0 POSTMENOPAUSAL STATUS (AGE-RELATED) (NATURAL): ICD-10-CM

## 2021-02-16 DIAGNOSIS — K21.00 GASTROESOPHAGEAL REFLUX DISEASE WITH ESOPHAGITIS WITHOUT HEMORRHAGE: ICD-10-CM

## 2021-02-16 DIAGNOSIS — N95.1 MENOPAUSAL STATE: ICD-10-CM

## 2021-02-16 PROCEDURE — G0402 INITIAL PREVENTIVE EXAM: HCPCS | Mod: 95,CR | Performed by: FAMILY MEDICINE

## 2021-02-16 ASSESSMENT — ACTIVITIES OF DAILY LIVING (ADL): BATHING_REQUIRES_ASSISTANCE: 0

## 2021-02-16 ASSESSMENT — FIBROSIS 4 INDEX: FIB4 SCORE: 1.57

## 2021-02-16 ASSESSMENT — ENCOUNTER SYMPTOMS: GENERAL WELL-BEING: EXCELLENT

## 2021-02-16 ASSESSMENT — PATIENT HEALTH QUESTIONNAIRE - PHQ9: CLINICAL INTERPRETATION OF PHQ2 SCORE: 0

## 2021-02-16 NOTE — PROGRESS NOTES
Assessment/Plan:    Colic    Advised to decrease feeds to 3-4 oz every 3-4 hours  Discussed continued drops if it helps and may consider restarting zantac if mom notices pain  Advised on monitoring for projectile vomiting, if worsens may consider Us  Advised on symptoms of ICP  Advised on normal exam today  Parent understands and agrees with plan      Subjective:     Patient ID: Nerissa Phillips is a 2 m o  male    HPI  1 month old male with h/o reflux like symptoms/colic  Seen 4/13 for follow up ED visit for fussiness  Continues to have some spits and is fussy  Good weight gain  Briefly trialed zantac in the past  Seemed to make it worse  Colic worse at night 1-7AX  Sometimes hard to consol  Told to use simethicone/colic drops  Has been using  Seems to be a little better since starting sim sensitive  No projectile vomiting, no lethargy, no sundowning eyes  Feeding- 4-5 oz every 3-4 hours, sometimes seems to want more  Stools and wd- no change  H/o brain cyst  Due for MRI at 6 months  The following portions of the patient's history were reviewed and updated as appropriate: allergies, current medications, past family history, past medical history, past social history, past surgical history and problem list     Review of Systems  See hpi  Objective:    Vitals:    04/16/18 0922   Weight: 4890 g (10 lb 12 5 oz)       Physical Exam   HENT:   Head: Anterior fontanelle is flat  No cranial deformity  Mouth/Throat: Oropharynx is clear  afof  pfof  HC stable  Eyes: Conjunctivae and EOM are normal  Pupils are equal, round, and reactive to light  Neck: Normal range of motion  Cardiovascular: Regular rhythm, S1 normal and S2 normal     Pulmonary/Chest: Effort normal and breath sounds normal  No respiratory distress  Abdominal: Soft  Musculoskeletal: Normal range of motion  Neurological: He is alert  Skin: Skin is warm  Nursing note and vitals reviewed  Chief Complaint   Patient presents with   • Annual Wellness Visit     This evaluation was conducted via Zoom using secure and encrypted videoconferencing technology. The patient was in a private location in the state of [place].    The patient's identity was confirmed and verbal consent was obtained for this virtual visit.    HPI:  Keisha is a 66 y.o. here for Medicare Annual Wellness Visit        Patient Active Problem List    Diagnosis Date Noted   • Dupuytren's disease 04/03/2019   • Tinnitus of both ears 04/03/2019   • Hypercalcemia 09/05/2018   • Vitamin D deficiency 09/05/2018   • Screening for colon cancer 03/02/2018   • Annual physical exam 03/02/2018   • Decreased libido 03/02/2018   • Need for vaccination 03/02/2018   • Inflamed seborrheic keratosis 03/02/2018   • AK (actinic keratosis) 03/02/2018   • GERD (gastroesophageal reflux disease) 10/22/2015   • Mitral valve disorder 09/08/2014   • Epigastric pain 03/18/2014   • HTN (hypertension) 03/17/2014   • Hypotension (arterial) 03/16/2014   • Hypokalemia 03/16/2014   • Lower urinary tract infectious disease 03/16/2014       Current Outpatient Medications   Medication Sig Dispense Refill   • losartan (COZAAR) 50 MG Tab Take 1 Tab by mouth every day. 30 Tab 0   • potassium chloride SA (K-DUR) 10 MEQ Tab CR TAKE 1 TAB BY MOUTH EVERY DAY FOR HYPERTENSION 30 Tab 2   • hydroCHLOROthiazide (HYDRODIURIL) 12.5 MG tablet TAKE 1 TABLET BY MOUTH EVERY DAY 90 Tab 1   • ondansetron (ZOFRAN) 8 MG Tab TAKE 1 TABLET BY MOUTH EVERY 8 HOURS AS NEEDED FOR NAUSEA/VOMITING 15 Tab 0   • Ascorbic Acid (VITAMIN C CR) 1500 MG Tab CR Take  by mouth.     • vitamin D (CHOLECALCIFEROL) 1000 UNIT Tab Take 1,000 Units by mouth every day.     • Misc. Devices Misc Please provide patient with shingles vaccine and 1 Application 1   • albuterol (VENTOLIN OR PROVENTIL) 108 (90 BASE) MCG/ACT Aero Soln inhalation aerosol Inhale 2 Puffs by mouth every 6 hours as needed for Shortness of Breath. 8.5  g 3   • omeprazole (PRILOSEC) 20 MG delayed-release capsule Take 1 Cap by mouth 2 times a day. Indications: **OTC**     • esterified estrogen-methyltest (ESTRATEST) 1.25-2.5 MG Tab Take 1 Tab by mouth every day.     • multivitamin (THERAGRAN) TABS Take 1 Tab by mouth every day. Indications: **OTC**     • docosahexanoic acid (OMEGA 3 FA) 1000 MG CAPS Take 1,000 mg by mouth 2 Times a Day. Indications: **OTC**     • benzonatate (TESSALON) 100 MG Cap Take 1 Cap by mouth 3 times a day as needed for Cough. 60 Cap 0   • albuterol 108 (90 Base) MCG/ACT Aero Soln inhalation aerosol Inhale 2 Puffs by mouth every four hours as needed for Shortness of Breath. 1 Inhaler 0   • methylPREDNISolone (MEDROL DOSEPAK) 4 MG Tablet Therapy Pack Take 1 Tab by mouth every day. 1 Kit 0   • albuterol 108 (90 Base) MCG/ACT Aero Soln inhalation aerosol Inhale 2 Puffs by mouth every 6 hours as needed for Shortness of Breath. 8.5 Inhaler 0   • aspirin (ASA) 81 MG Chew Tab chewable tablet Take 1 Tab by mouth every day. 360 Tab 1     No current facility-administered medications for this visit.        Patient is taking medications as noted in medication list.  Current supplements as per medication list.     Allergies: Codeine, Erythromycin, and Inapsine [droperidol]    Current social contact/activities: Zoom meeting with friends      Is patient current with immunizations? No, due for PNEUMOVAX (PPSV23). Patient is interested in receiving NONE today.    She  reports that she has never smoked. She has never used smokeless tobacco. She reports current alcohol use of about 1.2 oz of alcohol per week. She reports that she does not use drugs.  Counseling given: Not Answered        DPA/Advanced directive: Patient has Advanced Directive on file.     ROS:    Gait: Uses no assistive device   Ostomy: No   Other tubes: No   Amputations: No   Chronic oxygen use No   Last eye exam 2 years    Wears hearing aids: No   : Denies any urinary leakage during the last  6 months      Depression Screening    Little interest or pleasure in doing things?  0 - not at all  Feeling down, depressed, or hopeless? 0 - not at all  Patient Health Questionnaire Score: 0    If depressive symptoms identified deferred to follow up visit unless specifically addressed in assessment and plan.    Interpretation of PHQ-9 Total Score   Score Severity   1-4 No Depression   5-9 Mild Depression   10-14 Moderate Depression   15-19 Moderately Severe Depression   20-27 Severe Depression    Screening for Cognitive Impairment    Three Minute Recall (river, nation, finger)  33/3 bandar Lopez finger   Александр clock face with all 12 numbers and set the hands to show 10 past 11.  Yes5/5 11:10 5/5  If cognitive concerns identified, deferred for follow up unless specifically addressed in assessment and plan.    Fall Risk Assessment    Has the patient had two or more falls in the last year or any fall with injury in the last year?  No  If fall risk identified, deferred for follow up unless specifically addressed in assessment and plan.    Safety Assessment    Throw rugs on floor.  Yes  Handrails on all stairs.  Yes  Good lighting in all hallways.  Yes  Difficulty hearing.  No  Patient counseled about all safety risks that were identified.    Functional Assessment ADLs    Are there any barriers preventing you from cooking for yourself or meeting nutritional needs?  No.    Are there any barriers preventing you from driving safely or obtaining transportation?  No.    Are there any barriers preventing you from using a telephone or calling for help?  No.    Are there any barriers preventing you from shopping?  No.    Are there any barriers preventing you from taking care of your own finances?  No.    Are there any barriers preventing you from managing your medications?  No.    Are there any barriers preventing you from showering, bathing or dressing yourself?  No.    Are you currently engaging in any exercise or physical  "activity?  Yes.  Gym X 3 times a week, ride horses   What is your perception of your health?  Excellent.    Health Maintenance Summary                COLONOSCOPY Overdue 6/13/2020      Done 6/13/2019 REFERRAL TO GI FOR COLONOSCOPY     Patient has more history with this topic...    IMM INFLUENZA Overdue 9/1/2020      Done 10/2/2019 Imm Admin: Influenza, Unspecified - HISTORICAL DATA     Patient has more history with this topic...    BONE DENSITY Overdue 2/11/2021     IMM PNEUMOCOCCAL VACCINE: 65+ Years Overdue 2/11/2021      Done 11/24/2014 Imm Admin: Pneumococcal polysaccharide vaccine (PPSV-23)     Patient has more history with this topic...    MAMMOGRAM Next Due 6/25/2021      Done 6/25/2020 MA-SCREENING MAMMO BILAT W/TOMOSYNTHESIS W/CAD     Patient has more history with this topic...    IMM DTaP/Tdap/Td Vaccine Next Due 3/2/2028      Done 3/2/2018 Imm Admin: Tdap Vaccine          Patient Care Team:  Cornelius Andrew M.D. as PCP - General (Family Medicine)  Jacqueline Arias M.D. as Consulting Physician (Gynecology)    Social History     Tobacco Use   • Smoking status: Never Smoker   • Smokeless tobacco: Never Used   Substance Use Topics   • Alcohol use: Yes     Alcohol/week: 1.2 oz     Types: 2 Glasses of wine per week     Comment: 1-2 glasses every day   • Drug use: Never     Family History   Problem Relation Age of Onset   • Cancer Paternal Grandmother      She  has a past medical history of Hypertension. She also has no past medical history of Breast cancer (HCC).   Past Surgical History:   Procedure Laterality Date   • GYN SURGERY      hysterectomy   • OTHER      tonsillectomy   • OTHER ABDOMINAL SURGERY      appy           Exam:     /80 (BP Location: Left arm, Patient Position: Sitting, BP Cuff Size: Adult)   Pulse 78   Temp 36.4 °C (97.6 °F) (Temporal)   Ht 1.702 m (5' 7\")   Wt 75.8 kg (167 lb)   SpO2 97%  Body mass index is 26.16 kg/m².    Hearing excellent.    Dentition good  Alert, oriented in " no acute distress.  Eye contact is good, speech goal directed, affect calm      Assessment and Plan. The following treatment and monitoring plan is recommended:    1. Medicare annual wellness visit, initial     2. Postmenopausal status (age-related) (natural)  DS-BONE DENSITY STUDY (DEXA)   3. Menopausal state  DS-BONE DENSITY STUDY (DEXA)   4. Essential hypertension     5. Gastroesophageal reflux disease with esophagitis without hemorrhage       Plan    1. Completed    2. Due for bone Density    3. See #2    4. Patient has been stable with current management  We will make no changes for now    5. Patient has been stable with current management  We will make no changes for now      Addendum made to declare this was a virtual visit.  Services suggested: No services needed at this time  Health Care Screening recommendations as per orders if indicated.  Referrals offered: PT/OT/Nutrition counseling/Behavioral Health/Smoking cessation as per orders if indicated.    Discussion today about general wellness and lifestyle habits:    · Prevent falls and reduce trip hazards; Cautioned about securing or removing rugs.  · Have a working fire alarm and carbon monoxide detector;   · Engage in regular physical activity and social activities       Follow-up: Return in about 6 months (around 8/16/2021) for Reevaluation, labs.

## 2021-02-26 ENCOUNTER — TELEPHONE (OUTPATIENT)
Dept: MEDICAL GROUP | Age: 66
End: 2021-02-26

## 2021-02-26 DIAGNOSIS — Z23 NEED FOR VACCINATION: ICD-10-CM

## 2021-02-26 NOTE — TELEPHONE ENCOUNTER
Patient is on the MA Schedule 03/02/2021 for PPSV23 vaccine/injection.    SPECIFIC Action To Be Taken: Orders pending, please sign.

## 2021-03-02 ENCOUNTER — NON-PROVIDER VISIT (OUTPATIENT)
Dept: MEDICAL GROUP | Age: 66
End: 2021-03-02
Payer: MEDICARE

## 2021-03-02 DIAGNOSIS — Z23 NEED FOR VACCINATION: ICD-10-CM

## 2021-03-02 PROCEDURE — G0009 ADMIN PNEUMOCOCCAL VACCINE: HCPCS | Performed by: FAMILY MEDICINE

## 2021-03-02 PROCEDURE — 90732 PPSV23 VACC 2 YRS+ SUBQ/IM: CPT | Performed by: FAMILY MEDICINE

## 2021-03-02 NOTE — PROGRESS NOTES
"Keisha Lyle is a 66 y.o. female here for a non-provider visit for:   PNEUMOVAX (PPSV23)    Reason for immunization: Overdue/Provider Recommended  Immunization records indicate need for vaccine: Yes, confirmed with Epic  Minimum interval has been met for this vaccine: Yes  ABN completed: Not Indicated    Order and dose verified by: N/A  VIS Dated  10/2019 was given to patient: Yes  All IAC Questionnaire questions were answered \"No.\"    Patient tolerated injection and no adverse effects were observed or reported: Yes    Pt scheduled for next dose in series: Not Indicated    "

## 2021-03-05 RX ORDER — LOSARTAN POTASSIUM 50 MG/1
50 TABLET ORAL DAILY
Qty: 100 TABLET | Refills: 0 | Status: SHIPPED | OUTPATIENT
Start: 2021-03-05 | End: 2021-05-07 | Stop reason: SDUPTHER

## 2021-03-10 DIAGNOSIS — R00.2 PALPITATIONS: ICD-10-CM

## 2021-03-28 DIAGNOSIS — E87.6 HYPOKALEMIA: ICD-10-CM

## 2021-03-30 ENCOUNTER — HOSPITAL ENCOUNTER (OUTPATIENT)
Dept: RADIOLOGY | Facility: MEDICAL CENTER | Age: 66
End: 2021-03-30
Attending: FAMILY MEDICINE
Payer: MEDICARE

## 2021-03-30 PROCEDURE — 77080 DXA BONE DENSITY AXIAL: CPT

## 2021-03-30 RX ORDER — POTASSIUM CHLORIDE 750 MG/1
TABLET, EXTENDED RELEASE ORAL
Qty: 30 TABLET | Refills: 2 | Status: SHIPPED | OUTPATIENT
Start: 2021-03-30 | End: 2021-03-31

## 2021-03-30 RX ORDER — HYDROCHLOROTHIAZIDE 12.5 MG/1
TABLET ORAL
Qty: 30 TABLET | Refills: 5 | Status: SHIPPED | OUTPATIENT
Start: 2021-03-30 | End: 2021-03-31

## 2021-03-31 ENCOUNTER — TELEMEDICINE (OUTPATIENT)
Dept: CARDIOLOGY | Facility: MEDICAL CENTER | Age: 66
End: 2021-03-31
Attending: FAMILY MEDICINE
Payer: MEDICARE

## 2021-03-31 VITALS
WEIGHT: 172 LBS | OXYGEN SATURATION: 96 % | BODY MASS INDEX: 27 KG/M2 | SYSTOLIC BLOOD PRESSURE: 118 MMHG | HEART RATE: 84 BPM | HEIGHT: 67 IN | DIASTOLIC BLOOD PRESSURE: 76 MMHG

## 2021-03-31 DIAGNOSIS — I49.3 PVC (PREMATURE VENTRICULAR CONTRACTION): ICD-10-CM

## 2021-03-31 DIAGNOSIS — R00.2 PALPITATIONS: ICD-10-CM

## 2021-03-31 DIAGNOSIS — I10 ESSENTIAL HYPERTENSION: ICD-10-CM

## 2021-03-31 PROCEDURE — 99202 OFFICE O/P NEW SF 15 MIN: CPT | Performed by: INTERNAL MEDICINE

## 2021-03-31 RX ORDER — METOPROLOL SUCCINATE 25 MG/1
25 TABLET, EXTENDED RELEASE ORAL DAILY
Qty: 30 TABLET | Refills: 5 | Status: SHIPPED | OUTPATIENT
Start: 2021-03-31 | End: 2021-08-06 | Stop reason: SDUPTHER

## 2021-03-31 ASSESSMENT — FIBROSIS 4 INDEX: FIB4 SCORE: 1.57

## 2021-03-31 NOTE — PROGRESS NOTES
Virtual Visit: New Patient   This visit was conducted via Zoom using secure and encrypted videoconferencing technology. The patient was in a private location in the state of [place].    The patient's identity was confirmed and verbal consent was obtained for this virtual visit.    Subjective:     CC:   Chief Complaint   Patient presents with   • HTN (Controlled)   • Palpitations       Keisha Lyle is a 66 y.o. female seen as a new patient via Zoom video link for evaluation of PVCs.  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  She has had high blood pressure for a number of years.  Been has had intermittent palpitations for years but these have gotten much more frequent.  On her Apple watch she has documented that these are PVCs and has printed out copies of the rhythm issue.  The patient is quite physically active and can exercise for 40 minutes vigorously on a treadmill without any ectopy, chest pain, or dyspnea.  Her symptoms mostly occur at rest.    She was last seen in the office in 2014 for follow-up of an abnormal echo.  At that time the patient was hospitalized with sepsis and received aggressive volume resuscitation.    There is no history of smoking, diabetes, or hyperlipidemia.  No history of exercise tolerance as outlined above.      ROS  See HPI  Constitutional: Negative for fever, chills and malaise/fatigue.   HENT: Negative for congestion.    Eyes: Negative for pain.   Respiratory: Negative for cough and shortness of breath.    Cardiovascular: Negative for leg swelling.   Gastrointestinal: Negative for nausea, vomiting, abdominal pain and diarrhea.   Genitourinary: Negative for dysuria and hematuria.   Skin: Negative for rash.   Neurological: Negative for dizziness, focal weakness and headaches.   Endo/Heme/Allergies: Does not bruise/bleed easily.       Allergies   Allergen Reactions   • Codeine Hives   • Erythromycin Hives and  Vomiting     Rxn = unknown - years ago    • Inapsine [Droperidol]      agitated       Current medicines (including changes today)  Current Outpatient Medications   Medication Sig Dispense Refill   • metoprolol SR (TOPROL XL) 25 MG TABLET SR 24 HR Take 1 tablet by mouth every day. 30 tablet 5   • losartan (COZAAR) 50 MG Tab Take 1 tablet by mouth every day. 100 tablet 0   • ondansetron (ZOFRAN) 8 MG Tab TAKE 1 TABLET BY MOUTH EVERY 8 HOURS AS NEEDED FOR NAUSEA/VOMITING 15 Tab 0   • Ascorbic Acid (VITAMIN C CR) 1500 MG Tab CR Take  by mouth.     • vitamin D (CHOLECALCIFEROL) 1000 UNIT Tab Take 1,000 Units by mouth every day.     • omeprazole (PRILOSEC) 20 MG delayed-release capsule Take 1 Cap by mouth 2 times a day. Indications: **OTC**     • esterified estrogen-methyltest (ESTRATEST) 1.25-2.5 MG Tab Take 1 Tab by mouth every day.     • multivitamin (THERAGRAN) TABS Take 1 Tab by mouth every day. Indications: **OTC**     • docosahexanoic acid (OMEGA 3 FA) 1000 MG CAPS Take 1,000 mg by mouth 2 Times a Day. Indications: **OTC**     • benzonatate (TESSALON) 100 MG Cap Take 1 Cap by mouth 3 times a day as needed for Cough. 60 Cap 0   • albuterol 108 (90 Base) MCG/ACT Aero Soln inhalation aerosol Inhale 2 Puffs by mouth every four hours as needed for Shortness of Breath. 1 Inhaler 0   • methylPREDNISolone (MEDROL DOSEPAK) 4 MG Tablet Therapy Pack Take 1 Tab by mouth every day. 1 Kit 0   • albuterol 108 (90 Base) MCG/ACT Aero Soln inhalation aerosol Inhale 2 Puffs by mouth every 6 hours as needed for Shortness of Breath. 8.5 Inhaler 0   • aspirin (ASA) 81 MG Chew Tab chewable tablet Take 1 Tab by mouth every day. 360 Tab 1   • Misc. Devices Misc Please provide patient with shingles vaccine and 1 Application 1   • albuterol (VENTOLIN OR PROVENTIL) 108 (90 BASE) MCG/ACT Aero Soln inhalation aerosol Inhale 2 Puffs by mouth every 6 hours as needed for Shortness of Breath. 8.5 g 3     No current facility-administered medications  "for this visit.       She  has a past medical history of Hypertension. She also has no past medical history of Breast cancer (HCC).  She  has a past surgical history that includes other abdominal surgery; gyn surgery; and other.      Family History   Problem Relation Age of Onset   • Cancer Paternal Grandmother      Family Status   Relation Name Status   • PGMo  (Not Specified)   • Mo  Alive   • Fa  Alive   • Sis  Alive   • Bro  Alive   • Bro  Alive   • Bro  Alive       Patient Active Problem List    Diagnosis Date Noted   • PVC (premature ventricular contraction) 03/31/2021   • Palpitations 03/10/2021   • Dupuytren's disease 04/03/2019   • Tinnitus of both ears 04/03/2019   • Hypercalcemia 09/05/2018   • Vitamin D deficiency 09/05/2018   • Screening for colon cancer 03/02/2018   • Annual physical exam 03/02/2018   • Decreased libido 03/02/2018   • Need for vaccination 03/02/2018   • Inflamed seborrheic keratosis 03/02/2018   • AK (actinic keratosis) 03/02/2018   • GERD (gastroesophageal reflux disease) 10/22/2015   • Mitral valve disorder 09/08/2014   • Epigastric pain 03/18/2014   • HTN (hypertension) 03/17/2014   • Hypotension (arterial) 03/16/2014   • Hypokalemia 03/16/2014   • Lower urinary tract infectious disease 03/16/2014          Objective:   /76 (BP Location: Left arm, Patient Position: Sitting, BP Cuff Size: Adult)   Pulse 84   Ht 1.702 m (5' 7\")   Wt 78 kg (172 lb)   SpO2 96%   BMI 26.94 kg/m²     Physical Exam: Patient evaluated via Zoom video link   constitutional: Alert, no distress,   Psych: Alert and oriented x3, normal affect and mood.       Assessment and Plan:   The following treatment plan was discussed:     1. Essential hypertension  - EC-ECHOCARDIOGRAM COMPLETE W/O CONT; Future    2. Palpitations  - EC-ECHOCARDIOGRAM COMPLETE W/O CONT; Future    3. PVC (premature ventricular contraction)  - EC-ECHOCARDIOGRAM COMPLETE W/O CONT; Future    Other orders  - metoprolol SR (TOPROL XL) 25 " MG TABLET SR 24 HR; Take 1 tablet by mouth every day.  Dispense: 30 tablet; Refill: 5        Follow-up: Return in about 4 weeks (around 4/28/2021).       Hypertension: Under good control by her home readings on her current medications.    Symptomatic PVCs: PVCs have been documented at home using her Apple watch.  Plan: Discontinue hydrochlorothiazide and potassium supplements.    Continue current dose of losartan.  Start metoprolol succinate 25 mg daily.  Follow-up in the office in 1 month for face-to-face evaluation.  Face-to-face time spent on Zoom 21 minutes.

## 2021-04-09 ENCOUNTER — PATIENT OUTREACH (OUTPATIENT)
Dept: HEALTH INFORMATION MANAGEMENT | Facility: OTHER | Age: 66
End: 2021-04-09

## 2021-04-12 ENCOUNTER — PATIENT MESSAGE (OUTPATIENT)
Dept: MEDICAL GROUP | Age: 66
End: 2021-04-12

## 2021-04-12 DIAGNOSIS — M21.619 BUNION: ICD-10-CM

## 2021-04-14 NOTE — PATIENT COMMUNICATION
1. Caller Name: Keisha Lyle                          Call Back Number: [phone] (home)         How would the patient prefer to be contacted with a response: [employer]    2. SPECIFIC Action To Be Taken: Referral pending, please sign.    3. Diagnosis/Clinical Reason for Request: Bunion, possible removal    4. Specialty & Provider Name/Lab/Imaging Location: [facility], [facility]    5. Is appointment scheduled for requested order/referral: no    Patient was not informed they will receive a return phone call from the office ONLY if there are any questions before processing their request. Advised to call back if they haven't received a call from the referral department in 5 days.

## 2021-05-03 ENCOUNTER — OFFICE VISIT (OUTPATIENT)
Dept: CARDIOLOGY | Facility: MEDICAL CENTER | Age: 66
End: 2021-05-03
Payer: MEDICARE

## 2021-05-03 VITALS
SYSTOLIC BLOOD PRESSURE: 122 MMHG | OXYGEN SATURATION: 97 % | WEIGHT: 174 LBS | HEIGHT: 67 IN | DIASTOLIC BLOOD PRESSURE: 74 MMHG | BODY MASS INDEX: 27.31 KG/M2 | HEART RATE: 77 BPM

## 2021-05-03 DIAGNOSIS — I10 ESSENTIAL HYPERTENSION: ICD-10-CM

## 2021-05-03 DIAGNOSIS — I49.3 PVC (PREMATURE VENTRICULAR CONTRACTION): ICD-10-CM

## 2021-05-03 DIAGNOSIS — R00.2 PALPITATIONS: ICD-10-CM

## 2021-05-03 PROCEDURE — 99213 OFFICE O/P EST LOW 20 MIN: CPT | Performed by: INTERNAL MEDICINE

## 2021-05-03 ASSESSMENT — ENCOUNTER SYMPTOMS
RESPIRATORY NEGATIVE: 1
CONSTITUTIONAL NEGATIVE: 1
PALPITATIONS: 1
NEUROLOGICAL NEGATIVE: 1
MUSCULOSKELETAL NEGATIVE: 1
GASTROINTESTINAL NEGATIVE: 1

## 2021-05-03 ASSESSMENT — FIBROSIS 4 INDEX: FIB4 SCORE: 1.57

## 2021-05-03 NOTE — PROGRESS NOTES
Chief Complaint   Patient presents with   • Hypertension       Subjective:   Emilia Lyle is a 66 y.o. female who presents today for follow-up of hypertension and symptomatic PVCs.  She was seen via Zoom video link last month and change from HCTZ to metoprolol.  She is continued her losartan.  Her PVCs have not been too bothersome for her but are still present.  Her blood pressure at home have been ranging high and sometimes in the 140 range.    Past Medical History:   Diagnosis Date   • Hypertension     medicated     Past Surgical History:   Procedure Laterality Date   • GYN SURGERY      hysterectomy   • OTHER      tonsillectomy   • OTHER ABDOMINAL SURGERY      appy     Family History   Problem Relation Age of Onset   • Cancer Paternal Grandmother      Social History     Socioeconomic History   • Marital status:      Spouse name: Not on file   • Number of children: Not on file   • Years of education: Not on file   • Highest education level: Not on file   Occupational History   • Not on file   Tobacco Use   • Smoking status: Never Smoker   • Smokeless tobacco: Never Used   Substance and Sexual Activity   • Alcohol use: Yes     Alcohol/week: 1.2 oz     Types: 2 Glasses of wine per week     Comment: 1-2 glasses every day   • Drug use: Never   • Sexual activity: Not on file   Other Topics Concern   • Not on file   Social History Narrative   • Not on file     Social Determinants of Health     Financial Resource Strain:    • Difficulty of Paying Living Expenses:    Food Insecurity:    • Worried About Running Out of Food in the Last Year:    • Ran Out of Food in the Last Year:    Transportation Needs:    • Lack of Transportation (Medical):    • Lack of Transportation (Non-Medical):    Physical Activity:    • Days of Exercise per Week:    • Minutes of Exercise per Session:    Stress:    • Feeling of Stress :    Social Connections:    • Frequency of Communication with Friends and Family:    • Frequency of Social  Gatherings with Friends and Family:    • Attends Sikhism Services:    • Active Member of Clubs or Organizations:    • Attends Club or Organization Meetings:    • Marital Status:    Intimate Partner Violence:    • Fear of Current or Ex-Partner:    • Emotionally Abused:    • Physically Abused:    • Sexually Abused:      Allergies   Allergen Reactions   • Codeine Hives   • Erythromycin Hives and Vomiting     Rxn = unknown - years ago    • Inapsine [Droperidol]      agitated     Outpatient Encounter Medications as of 5/3/2021   Medication Sig Dispense Refill   • metoprolol SR (TOPROL XL) 25 MG TABLET SR 24 HR Take 1 tablet by mouth every day. 30 tablet 5   • losartan (COZAAR) 50 MG Tab Take 1 tablet by mouth every day. 100 tablet 0   • ondansetron (ZOFRAN) 8 MG Tab TAKE 1 TABLET BY MOUTH EVERY 8 HOURS AS NEEDED FOR NAUSEA/VOMITING 15 Tab 0   • albuterol 108 (90 Base) MCG/ACT Aero Soln inhalation aerosol Inhale 2 Puffs by mouth every 6 hours as needed for Shortness of Breath. 8.5 Inhaler 0   • Ascorbic Acid (VITAMIN C CR) 1500 MG Tab CR Take  by mouth.     • vitamin D (CHOLECALCIFEROL) 1000 UNIT Tab Take 1,000 Units by mouth every day.     • Misc. Devices Misc Please provide patient with shingles vaccine and 1 Application 1   • albuterol (VENTOLIN OR PROVENTIL) 108 (90 BASE) MCG/ACT Aero Soln inhalation aerosol Inhale 2 Puffs by mouth every 6 hours as needed for Shortness of Breath. 8.5 g 3   • omeprazole (PRILOSEC) 20 MG delayed-release capsule Take 1 Cap by mouth 2 times a day. Indications: **OTC**     • esterified estrogen-methyltest (ESTRATEST) 1.25-2.5 MG Tab Take 1 Tab by mouth every day.     • multivitamin (THERAGRAN) TABS Take 1 Tab by mouth every day. Indications: **OTC**     • docosahexanoic acid (OMEGA 3 FA) 1000 MG CAPS Take 1,000 mg by mouth 2 Times a Day. Indications: **OTC**     • benzonatate (TESSALON) 100 MG Cap Take 1 Cap by mouth 3 times a day as needed for Cough. 60 Cap 0   • albuterol 108 (90 Base)  "MCG/ACT Aero Soln inhalation aerosol Inhale 2 Puffs by mouth every four hours as needed for Shortness of Breath. 1 Inhaler 0   • methylPREDNISolone (MEDROL DOSEPAK) 4 MG Tablet Therapy Pack Take 1 Tab by mouth every day. 1 Kit 0   • aspirin (ASA) 81 MG Chew Tab chewable tablet Take 1 Tab by mouth every day. 360 Tab 1     No facility-administered encounter medications on file as of 5/3/2021.     Review of Systems   Constitutional: Negative.    HENT: Negative.    Respiratory: Negative.    Cardiovascular: Positive for palpitations.   Gastrointestinal: Negative.    Musculoskeletal: Negative.    Skin: Negative.    Neurological: Negative.    Endo/Heme/Allergies: Negative.         Objective:   /74 (BP Location: Left arm, Patient Position: Sitting, BP Cuff Size: Adult)   Pulse 77   Ht 1.702 m (5' 7\")   Wt 78.9 kg (174 lb)   SpO2 97%   BMI 27.25 kg/m²     Physical Exam   Constitutional: She is oriented to person, place, and time. No distress.   HENT:   Head: Normocephalic and atraumatic.   Eyes: Pupils are equal, round, and reactive to light. No scleral icterus.   Neck: No JVD present.   Cardiovascular: Normal rate and regular rhythm.   No murmur heard.  Pulmonary/Chest: No respiratory distress. She has no wheezes.   Abdominal: Soft. She exhibits no distension. There is no abdominal tenderness.   Musculoskeletal:         General: No edema.   Neurological: She is alert and oriented to person, place, and time.   Skin: Skin is warm.   Psychiatric: She has a normal mood and affect.       Assessment:     1. Palpitations     2. PVC (premature ventricular contraction)     3. Essential hypertension         Medical Decision Making:  Today's Assessment / Status / Plan:   Hypertension: Blood pressure is under good control on current medications.  She will monitor pressure at home and call if she has elevated readings.    Symptomatic PVCs: Recent echo was normal.  She has a coronary calcium score of 0.  The echo findings were " reviewed with her and she was reassured that the palpitations are not dangerous but but can be annoying.  Return in 4 months.

## 2021-05-06 ENCOUNTER — PATIENT MESSAGE (OUTPATIENT)
Dept: CARDIOLOGY | Facility: MEDICAL CENTER | Age: 66
End: 2021-05-06

## 2021-05-07 RX ORDER — LOSARTAN POTASSIUM 100 MG/1
100 TABLET ORAL DAILY
Qty: 100 TABLET | Refills: 3 | Status: SHIPPED | OUTPATIENT
Start: 2021-05-07 | End: 2022-05-10 | Stop reason: SDUPTHER

## 2021-05-07 NOTE — PATIENT COMMUNICATION
You  Kevan Hollins M.D. 17 hours ago (3:21 PM)     Are you ok with BP in the 140/90 range, or did you want her to increase BP meds?   Thank you!      Kevan Hollins M.D.  You 1 hour ago (7:20 AM)     Increase her losartan to 100 mg a day.   Thanks

## 2021-07-06 ENCOUNTER — HOSPITAL ENCOUNTER (OUTPATIENT)
Dept: RADIOLOGY | Facility: MEDICAL CENTER | Age: 66
End: 2021-07-06
Attending: FAMILY MEDICINE
Payer: MEDICARE

## 2021-07-06 DIAGNOSIS — Z12.31 VISIT FOR SCREENING MAMMOGRAM: ICD-10-CM

## 2021-07-06 PROCEDURE — 77063 BREAST TOMOSYNTHESIS BI: CPT

## 2021-08-06 ENCOUNTER — PATIENT MESSAGE (OUTPATIENT)
Dept: MEDICAL GROUP | Age: 66
End: 2021-08-06

## 2021-08-06 DIAGNOSIS — R00.2 PALPITATIONS: ICD-10-CM

## 2021-08-10 RX ORDER — METOPROLOL SUCCINATE 25 MG/1
25 TABLET, EXTENDED RELEASE ORAL DAILY
Qty: 30 TABLET | Refills: 1 | Status: SHIPPED | OUTPATIENT
Start: 2021-08-10 | End: 2021-08-20 | Stop reason: SDUPTHER

## 2021-08-20 ENCOUNTER — OFFICE VISIT (OUTPATIENT)
Dept: CARDIOLOGY | Facility: MEDICAL CENTER | Age: 66
End: 2021-08-20
Payer: MEDICARE

## 2021-08-20 VITALS
HEIGHT: 67 IN | BODY MASS INDEX: 25.11 KG/M2 | RESPIRATION RATE: 26 BRPM | DIASTOLIC BLOOD PRESSURE: 84 MMHG | OXYGEN SATURATION: 95 % | WEIGHT: 160 LBS | SYSTOLIC BLOOD PRESSURE: 122 MMHG | HEART RATE: 79 BPM

## 2021-08-20 DIAGNOSIS — I49.3 SYMPTOMATIC PVCS: Primary | ICD-10-CM

## 2021-08-20 DIAGNOSIS — I10 ESSENTIAL HYPERTENSION: ICD-10-CM

## 2021-08-20 DIAGNOSIS — R00.2 PALPITATIONS: ICD-10-CM

## 2021-08-20 LAB — EKG IMPRESSION: NORMAL

## 2021-08-20 PROCEDURE — 93000 ELECTROCARDIOGRAM COMPLETE: CPT | Performed by: INTERNAL MEDICINE

## 2021-08-20 PROCEDURE — 99213 OFFICE O/P EST LOW 20 MIN: CPT | Performed by: INTERNAL MEDICINE

## 2021-08-20 RX ORDER — METOPROLOL SUCCINATE 25 MG/1
25 TABLET, EXTENDED RELEASE ORAL DAILY
Qty: 90 TABLET | Refills: 3 | Status: SHIPPED | OUTPATIENT
Start: 2021-08-20 | End: 2022-07-25

## 2021-08-20 ASSESSMENT — ENCOUNTER SYMPTOMS
ABDOMINAL PAIN: 0
SYNCOPE: 0
PALPITATIONS: 1
VOMITING: 0
WHEEZING: 0
DIAPHORESIS: 0
DECREASED APPETITE: 0
NUMBNESS: 0
HEADACHES: 0
SLEEP DISTURBANCES DUE TO BREATHING: 0
PARESTHESIAS: 0
SHORTNESS OF BREATH: 0
IRREGULAR HEARTBEAT: 0
WEAKNESS: 0
NAUSEA: 0
DIZZINESS: 0
ORTHOPNEA: 0

## 2021-08-20 ASSESSMENT — FIBROSIS 4 INDEX: FIB4 SCORE: 1.57

## 2021-08-20 NOTE — PROGRESS NOTES
Cardiology Follow-up Consultation Note    Date of note:    8/20/2021    Primary Care Provider: Cornelius Andrew M.D.    Name:             Keisha Lyle   YOB: 1955  MRN:               [id number]    Chief Complaint   Patient presents with   • Palpitations   • HTN (Controlled)   • Premature Ventricular Contractions (PVCs)       HISTORY OF PRESENT ILLNESS  Ms. Keisha Lyle is a 66 y.o. female who returns to see us for follow-up of HTN and symptomatic PVCs.    Last clinic visit: 5/3/2021 with Dr. Hollins.  Patient is new to me.    Interim History:  Since her last visit, she has been feeling well.  Has occasional PVCs but are not bothersome.  Much better since being on metoprolol.    Exercises daily with no concerning symptoms.      Feels PVCs mostly when she is resting.    Review of Systems   Constitutional: Negative for decreased appetite, diaphoresis and malaise/fatigue.   Cardiovascular: Positive for palpitations. Negative for chest pain, irregular heartbeat, leg swelling, orthopnea and syncope.   Respiratory: Negative for shortness of breath, sleep disturbances due to breathing and wheezing.    Gastrointestinal: Negative for abdominal pain, nausea and vomiting.   Neurological: Negative for dizziness, headaches, numbness, paresthesias and weakness.       Past Medical History:   Diagnosis Date   • Hypertension     medicated         Past Surgical History:   Procedure Laterality Date   • GYN SURGERY      hysterectomy   • OTHER      tonsillectomy   • OTHER ABDOMINAL SURGERY      appy         Current Outpatient Medications   Medication Sig Dispense Refill   • metoprolol SR (TOPROL XL) 25 MG TABLET SR 24 HR Take 1 Tablet by mouth every day. 90 Tablet 3   • losartan (COZAAR) 100 MG Tab Take 1 tablet by mouth every day. 100 tablet 3   • ondansetron (ZOFRAN) 8 MG Tab TAKE 1 TABLET BY MOUTH EVERY 8 HOURS AS NEEDED FOR NAUSEA/VOMITING 15 Tab 0   • Ascorbic Acid (VITAMIN C CR) 1500 MG Tab CR Take  by  mouth.     • vitamin D (CHOLECALCIFEROL) 1000 UNIT Tab Take 1,000 Units by mouth every day.     • omeprazole (PRILOSEC) 20 MG delayed-release capsule Take 1 Cap by mouth 2 times a day. Indications: **OTC**     • multivitamin (THERAGRAN) TABS Take 1 Tab by mouth every day. Indications: **OTC**     • docosahexanoic acid (OMEGA 3 FA) 1000 MG CAPS Take 1,000 mg by mouth 2 Times a Day. Indications: **OTC**       No current facility-administered medications for this visit.         Allergies   Allergen Reactions   • Codeine Hives   • Erythromycin Hives and Vomiting     Rxn = unknown - years ago    • Inapsine [Droperidol]      agitated         Family History   Problem Relation Age of Onset   • Cancer Paternal Grandmother          Social History     Socioeconomic History   • Marital status:      Spouse name: Not on file   • Number of children: Not on file   • Years of education: Not on file   • Highest education level: Not on file   Occupational History   • Not on file   Tobacco Use   • Smoking status: Never Smoker   • Smokeless tobacco: Never Used   Vaping Use   • Vaping Use: Never used   Substance and Sexual Activity   • Alcohol use: Yes     Alcohol/week: 8.4 oz     Types: 14 Glasses of wine per week     Comment: 1-2 glasses every day   • Drug use: Never   • Sexual activity: Not on file   Other Topics Concern   • Not on file   Social History Narrative   • Not on file     Social Determinants of Health     Financial Resource Strain:    • Difficulty of Paying Living Expenses:    Food Insecurity:    • Worried About Running Out of Food in the Last Year:    • Ran Out of Food in the Last Year:    Transportation Needs:    • Lack of Transportation (Medical):    • Lack of Transportation (Non-Medical):    Physical Activity:    • Days of Exercise per Week:    • Minutes of Exercise per Session:    Stress:    • Feeling of Stress :    Social Connections:    • Frequency of Communication with Friends and Family:    • Frequency of  "Social Gatherings with Friends and Family:    • Attends Confucianist Services:    • Active Member of Clubs or Organizations:    • Attends Club or Organization Meetings:    • Marital Status:    Intimate Partner Violence:    • Fear of Current or Ex-Partner:    • Emotionally Abused:    • Physically Abused:    • Sexually Abused:        Physical Exam:  Ambulatory Vitals  /84 (BP Location: Left arm, Patient Position: Sitting, BP Cuff Size: Adult)   Pulse 79   Resp (!) 26   Ht 1.702 m (5' 7\")   Wt 72.6 kg (160 lb)   SpO2 95%    Oxygen Therapy:  Pulse Oximetry: 95 %  BP Readings from Last 4 Encounters:   08/20/21 122/84   05/03/21 122/74   03/31/21 118/76   02/16/21 124/80       Weight/BMI: Body mass index is 25.06 kg/m².  Wt Readings from Last 4 Encounters:   08/20/21 72.6 kg (160 lb)   05/03/21 78.9 kg (174 lb)   03/31/21 78 kg (172 lb)   02/16/21 75.8 kg (167 lb)       GEN: Well developed, well nourished and in no acute distress.  HEART: no significant JVD, regular rate and rhythm, normal S1 and S2, grade I/VI systolic murmurs, no third heart sounds, normal cardiac palpation  LUNG: clear to auscultation bilaterally, no wheezing, no crackles, normal respiratory effort on room air  ABDOMEN: soft, non-tender, non-distended, normal bowel sounds throughout  EXTREMITIES: no peripheral edema noted  VASCULAR: no significantly elevated jugular venous pressure, no carotid bruits noted, radial pulses 2+ and equal      Lab Data Review:  Lab Results   Component Value Date/Time    CHOLSTRLTOT 222 (H) 02/09/2021 10:54 AM    LDL 90 02/09/2021 10:54 AM     02/09/2021 10:54 AM    TRIGLYCERIDE 53 02/09/2021 10:54 AM       Lab Results   Component Value Date/Time    SODIUM 141 02/09/2021 10:54 AM    POTASSIUM 4.1 02/09/2021 10:54 AM    CHLORIDE 102 02/09/2021 10:54 AM    CO2 25 02/09/2021 10:54 AM    GLUCOSE 84 02/09/2021 10:54 AM    BUN 18 02/09/2021 10:54 AM    CREATININE 0.96 02/09/2021 10:54 AM    CREATININE 0.9 01/29/2007 " 06:35 AM     Lab Results   Component Value Date/Time    ALKPHOSPHAT 54 02/09/2021 10:54 AM    ASTSGOT 35 02/09/2021 10:54 AM    ALTSGPT 28 02/09/2021 10:54 AM    TBILIRUBIN 0.8 02/09/2021 10:54 AM      Lab Results   Component Value Date/Time    WBC 6.1 02/09/2021 10:54 AM     Lab Results   Component Value Date/Time    HBA1C 5.1 08/06/2013 08:15 AM       Cardiac Imaging and Procedures Review:    EKG dated 8/20/2021: My personal interpretation is sinus rhythm    Echo dated 4/8/2021:   CONCLUSIONS   Normal left ventricular size and function with EF 65%, wall thickness, and systolic function.     Radiology test Review:  CT Cardiac Scoring (6/12/2019):    FINDINGS:   Coronary calcification:  LMA - 0.0  LCX - 0.0  LAD - 0.0  RCA - 0.0  PDA - 0.0    Total Calcium Score: 0.0      Assessment & Plan     1. Symptomatic PVCs  EKG    metoprolol SR (TOPROL XL) 25 MG TABLET SR 24 HR   2. Essential hypertension     3. Palpitations  metoprolol SR (TOPROL XL) 25 MG TABLET SR 24 HR       Doing well on metoprolol-XL 25 mg with occasional PVCs.  Continue.  Echo reviewed from earlier this year which shows normal systolic function.    Blood pressure under well control.  Continue losartan 100 mg daily.    Coronary calcium score was 0 in 2019.  No indication to initiate statin.      All of patient's excellent questions were answered to the best of my knowledge and to her satisfaction.  It was a pleasure seeing Ms. Keisha Lyle in my clinic today. Return in about 1 year (around 8/20/2022). Patient is aware to call the cardiology clinic with any questions or concerns.      Clarence Garcia MD  [facility]  [facility], [address]  [address], [place] [address]  [place] [address]  Phone: [phone]  Fax: [phone]

## 2021-10-13 ENCOUNTER — TELEPHONE (OUTPATIENT)
Dept: MEDICAL GROUP | Age: 66
End: 2021-10-13

## 2021-10-13 NOTE — TELEPHONE ENCOUNTER
ESTABLISHED PATIENT PRE-VISIT PLANNING     Patient was NOT contacted to complete PVP.     Note: Patient will not be contacted if there is no indication to call.     1.  Reviewed notes from the last few office visits within the medical group: Yes    2.  If any orders were placed at last visit or intended to be done for this visit (i.e. 6 mos follow-up), do we have Results/Consult Notes?         •  Labs - Labs were not ordered at last office visit.  Note: If patient appointment is for lab review and patient did not complete labs, check with provider if OK to reschedule patient until labs completed.       •  Imaging - Imaging ordered, completed and results are in chart.       •  Referrals - Referral ordered, patient was seen and consult notes are in chart. Care Teams updated  YES.    3. Is this appointment scheduled as a Hospital Follow-Up? No    4.  Immunizations were updated in Epic using Reconcile Outside Information activity? Yes    5.  Patient is due for the following Health Maintenance Topics:   Health Maintenance Due   Topic Date Due   • COLORECTAL CANCER SCREENING  06/13/2020       - Patient plans to schedule appointment for Colonoscopy/FIT.    6.  AHA (Pulse8) form printed for Provider? No, patient does not have any open alerts

## 2021-10-14 ENCOUNTER — OFFICE VISIT (OUTPATIENT)
Dept: MEDICAL GROUP | Age: 66
End: 2021-10-14
Payer: MEDICARE

## 2021-10-14 VITALS
SYSTOLIC BLOOD PRESSURE: 140 MMHG | RESPIRATION RATE: 16 BRPM | HEIGHT: 67 IN | HEART RATE: 80 BPM | BODY MASS INDEX: 24.8 KG/M2 | DIASTOLIC BLOOD PRESSURE: 86 MMHG | OXYGEN SATURATION: 95 % | TEMPERATURE: 98.6 F | WEIGHT: 158 LBS

## 2021-10-14 DIAGNOSIS — L57.0 ACTINIC KERATOSIS: ICD-10-CM

## 2021-10-14 PROCEDURE — 17000 DESTRUCT PREMALG LESION: CPT | Performed by: FAMILY MEDICINE

## 2021-10-14 RX ORDER — ESTRADIOL 0.04 MG/D
1 FILM, EXTENDED RELEASE TRANSDERMAL
COMMUNITY

## 2021-10-14 ASSESSMENT — FIBROSIS 4 INDEX: FIB4 SCORE: 1.57

## 2021-10-14 NOTE — PROGRESS NOTES
This medical record contains text that has been entered with the assistance of computer voice recognition and dictation software.  Therefore, it may contain unintended errors in text, spelling, punctuation, or grammar      Chief Complaint   Patient presents with   • Skin Lesion         Keisha Lyle is a 66 y.o. female here evaluation and management of: skin spot      HPI:     1. AK (actinic keratosis)  Patient states she has developed a new red flaky lesion on top of her forehead.  She has a history of actinic keratoses and sun damage skin.  She states that she is very concerned about this and the other spots that we used cryotherapy to destroy it worked very well.  She would like to proceed with that.    Current medicines (including changes today)  Current Outpatient Medications   Medication Sig Dispense Refill   • estradiol (VIVELLE) 0.0375 MG/24HR patch Place 1 Patch on the skin.     • metoprolol SR (TOPROL XL) 25 MG TABLET SR 24 HR Take 1 Tablet by mouth every day. 90 Tablet 3   • losartan (COZAAR) 100 MG Tab Take 1 tablet by mouth every day. 100 tablet 3   • ondansetron (ZOFRAN) 8 MG Tab TAKE 1 TABLET BY MOUTH EVERY 8 HOURS AS NEEDED FOR NAUSEA/VOMITING 15 Tab 0   • Ascorbic Acid (VITAMIN C CR) 1500 MG Tab CR Take  by mouth.     • vitamin D (CHOLECALCIFEROL) 1000 UNIT Tab Take 1,000 Units by mouth every day.     • omeprazole (PRILOSEC) 20 MG delayed-release capsule Take 1 Cap by mouth 2 times a day. Indications: **OTC**     • multivitamin (THERAGRAN) TABS Take 1 Tab by mouth every day. Indications: **OTC**     • docosahexanoic acid (OMEGA 3 FA) 1000 MG CAPS Take 1,000 mg by mouth 2 Times a Day. Indications: **OTC**       No current facility-administered medications for this visit.     She  has a past medical history of Hypertension. She also has no past medical history of Breast cancer (HCC).  She  has a past surgical history that includes other abdominal surgery; gyn surgery; and other.  Social History  "    Tobacco Use   • Smoking status: Never Smoker   • Smokeless tobacco: Never Used   Vaping Use   • Vaping Use: Never used   Substance Use Topics   • Alcohol use: Yes     Alcohol/week: 8.4 oz     Types: 14 Glasses of wine per week     Comment: 1-2 glasses every day   • Drug use: Never     Social History     Social History Narrative   • Not on file     Family History   Problem Relation Age of Onset   • Cancer Paternal Grandmother      Family Status   Relation Name Status   • PGMo  (Not Specified)   • Mo  Alive   • Fa  Alive   • Sis  Alive   • Bro  Alive   • Bro  Alive   • Bro  Alive         ROS    The pertinent  ROS findings can be seen in the HPI above.     All other systems reviewed and are negative     Objective:     /86 (BP Location: Left arm, Patient Position: Sitting, BP Cuff Size: Adult)   Pulse 80   Temp 37 °C (98.6 °F) (Temporal)   Resp 16   Ht 1.702 m (5' 7\")   Wt 71.7 kg (158 lb)   SpO2 95%  Body mass index is 24.75 kg/m².      Physical Exam:    Constitutional: Alert, no distress.  Skin:   SKIN EXAM    ISK      AK  1 lesions on right forehead with evidence of of tender, pink, scaly with solar damage present , spotty hyperpigmentation, scattered telangiectasias, and  xerosis      PROCEDURE: CRYOTHERAPY  Discussed risks and benefits of cryotherapy including but not limited to scarring, hyperpigmentation, hypopigmentation, hypertrophic scarring, keiloid scarring, incomplete or no resolution of lesions treated,pain, undesirable cosemetic result, blistering, potential need for additional treatment including more invasive treatment. Patient expresses understanding and verbally acknowledges risks and consent to treatment. 2  applications of cryotherapy with 3 second freeze thaw cycle was applied to the above lesions.  Patient tolerated procedure well. There were no complications. Aftercare instructions given.    Eye: Equal, round and reactive, conjunctiva clear, lids normal.  ENMT: Lips without " lesions, good dentition, oropharynx clear.  Neck: Trachea midline, no masses, no thyromegaly. No cervical or supraclavicular lymphadenopathy.  Respiratory: Unlabored respiratory effort, lungs clear to auscultation, no wheezes, no ronchi.  Cardiovascular: Normal S1, S2, no murmur, no edema  Abdomen: Soft, non-tender, no masses, no hepatosplenomegaly.      Assessment and Plan:   The following treatment plan was discussed      1. AK (actinic keratosis)  Patient tolerated procedure well  There were no adverse events  Patient was given post procedure precautions         Instructed to Follow up in clinic or ER for worsening symptoms, difficulty breathing, lack of expected recovery, or should new symptoms or problems arise.    Followup: Return in about 6 months (around 4/14/2022) for Reevaluation.

## 2022-02-14 ENCOUNTER — PATIENT MESSAGE (OUTPATIENT)
Dept: HEALTH INFORMATION MANAGEMENT | Facility: OTHER | Age: 67
End: 2022-02-14
Payer: MEDICARE

## 2022-03-01 DIAGNOSIS — E55.9 VITAMIN D DEFICIENCY: ICD-10-CM

## 2022-03-01 DIAGNOSIS — E78.00 PURE HYPERCHOLESTEROLEMIA: ICD-10-CM

## 2022-05-10 DIAGNOSIS — I10 PRIMARY HYPERTENSION: ICD-10-CM

## 2022-05-12 RX ORDER — LOSARTAN POTASSIUM 100 MG/1
100 TABLET ORAL DAILY
Qty: 100 TABLET | Refills: 0 | Status: SHIPPED | OUTPATIENT
Start: 2022-05-12 | End: 2022-08-15

## 2022-05-16 ENCOUNTER — HOSPITAL ENCOUNTER (OUTPATIENT)
Dept: LAB | Facility: MEDICAL CENTER | Age: 67
End: 2022-05-16
Attending: FAMILY MEDICINE
Payer: MEDICARE

## 2022-05-16 DIAGNOSIS — E78.00 PURE HYPERCHOLESTEROLEMIA: ICD-10-CM

## 2022-05-16 DIAGNOSIS — E55.9 VITAMIN D DEFICIENCY: ICD-10-CM

## 2022-05-16 LAB
ALBUMIN SERPL BCP-MCNC: 4.6 G/DL (ref 3.2–4.9)
ALBUMIN/GLOB SERPL: 2.1 G/DL
ALP SERPL-CCNC: 55 U/L (ref 30–99)
ALT SERPL-CCNC: 26 U/L (ref 2–50)
ANION GAP SERPL CALC-SCNC: 14 MMOL/L (ref 7–16)
AST SERPL-CCNC: 30 U/L (ref 12–45)
BASOPHILS # BLD AUTO: 1.1 % (ref 0–1.8)
BASOPHILS # BLD: 0.06 K/UL (ref 0–0.12)
BILIRUB SERPL-MCNC: 0.6 MG/DL (ref 0.1–1.5)
BUN SERPL-MCNC: 18 MG/DL (ref 8–22)
CALCIUM SERPL-MCNC: 9.5 MG/DL (ref 8.5–10.5)
CHLORIDE SERPL-SCNC: 102 MMOL/L (ref 96–112)
CHOLEST SERPL-MCNC: 195 MG/DL (ref 100–199)
CO2 SERPL-SCNC: 22 MMOL/L (ref 20–33)
CREAT SERPL-MCNC: 0.78 MG/DL (ref 0.5–1.4)
EOSINOPHIL # BLD AUTO: 0.14 K/UL (ref 0–0.51)
EOSINOPHIL NFR BLD: 2.5 % (ref 0–6.9)
ERYTHROCYTE [DISTWIDTH] IN BLOOD BY AUTOMATED COUNT: 46.8 FL (ref 35.9–50)
GFR SERPLBLD CREATININE-BSD FMLA CKD-EPI: 84 ML/MIN/1.73 M 2
GLOBULIN SER CALC-MCNC: 2.2 G/DL (ref 1.9–3.5)
GLUCOSE SERPL-MCNC: 70 MG/DL (ref 65–99)
HCT VFR BLD AUTO: 44.2 % (ref 37–47)
HDLC SERPL-MCNC: 111 MG/DL
HGB BLD-MCNC: 14.8 G/DL (ref 12–16)
IMM GRANULOCYTES # BLD AUTO: 0.01 K/UL (ref 0–0.11)
IMM GRANULOCYTES NFR BLD AUTO: 0.2 % (ref 0–0.9)
LDLC SERPL CALC-MCNC: 78 MG/DL
LYMPHOCYTES # BLD AUTO: 1.43 K/UL (ref 1–4.8)
LYMPHOCYTES NFR BLD: 25.1 % (ref 22–41)
MCH RBC QN AUTO: 32.5 PG (ref 27–33)
MCHC RBC AUTO-ENTMCNC: 33.5 G/DL (ref 33.6–35)
MCV RBC AUTO: 97.1 FL (ref 81.4–97.8)
MONOCYTES # BLD AUTO: 0.57 K/UL (ref 0–0.85)
MONOCYTES NFR BLD AUTO: 10 % (ref 0–13.4)
NEUTROPHILS # BLD AUTO: 3.49 K/UL (ref 2–7.15)
NEUTROPHILS NFR BLD: 61.1 % (ref 44–72)
NRBC # BLD AUTO: 0 K/UL
NRBC BLD-RTO: 0 /100 WBC
PLATELET # BLD AUTO: 259 K/UL (ref 164–446)
PMV BLD AUTO: 9 FL (ref 9–12.9)
POTASSIUM SERPL-SCNC: 3.9 MMOL/L (ref 3.6–5.5)
PROT SERPL-MCNC: 6.8 G/DL (ref 6–8.2)
RBC # BLD AUTO: 4.55 M/UL (ref 4.2–5.4)
SODIUM SERPL-SCNC: 138 MMOL/L (ref 135–145)
TRIGL SERPL-MCNC: 32 MG/DL (ref 0–149)
WBC # BLD AUTO: 5.7 K/UL (ref 4.8–10.8)

## 2022-05-16 PROCEDURE — 82306 VITAMIN D 25 HYDROXY: CPT

## 2022-05-16 PROCEDURE — 36415 COLL VENOUS BLD VENIPUNCTURE: CPT

## 2022-05-16 PROCEDURE — 80061 LIPID PANEL: CPT

## 2022-05-16 PROCEDURE — 80053 COMPREHEN METABOLIC PANEL: CPT

## 2022-05-16 PROCEDURE — 85025 COMPLETE CBC W/AUTO DIFF WBC: CPT

## 2022-05-16 PROCEDURE — 84481 FREE ASSAY (FT-3): CPT

## 2022-05-17 LAB — T3FREE SERPL-MCNC: 2.8 PG/ML (ref 2–4.4)

## 2022-05-21 LAB — 25(OH)D3 SERPL-MCNC: 71 NG/ML (ref 30–80)

## 2022-06-07 ENCOUNTER — TELEMEDICINE (OUTPATIENT)
Dept: MEDICAL GROUP | Age: 67
End: 2022-06-07
Payer: MEDICARE

## 2022-06-07 VITALS
DIASTOLIC BLOOD PRESSURE: 66 MMHG | BODY MASS INDEX: 25.77 KG/M2 | SYSTOLIC BLOOD PRESSURE: 116 MMHG | TEMPERATURE: 98.2 F | HEIGHT: 67 IN | HEART RATE: 86 BPM | OXYGEN SATURATION: 97 % | WEIGHT: 164.2 LBS

## 2022-06-07 DIAGNOSIS — Z00.00 MEDICARE ANNUAL WELLNESS VISIT, INITIAL: ICD-10-CM

## 2022-06-07 DIAGNOSIS — R00.2 PALPITATIONS: ICD-10-CM

## 2022-06-07 DIAGNOSIS — K21.00 GASTROESOPHAGEAL REFLUX DISEASE WITH ESOPHAGITIS WITHOUT HEMORRHAGE: ICD-10-CM

## 2022-06-07 DIAGNOSIS — I10 PRIMARY HYPERTENSION: ICD-10-CM

## 2022-06-07 DIAGNOSIS — I49.3 PVC (PREMATURE VENTRICULAR CONTRACTION): ICD-10-CM

## 2022-06-07 PROCEDURE — G0439 PPPS, SUBSEQ VISIT: HCPCS | Mod: 95 | Performed by: FAMILY MEDICINE

## 2022-06-07 ASSESSMENT — ACTIVITIES OF DAILY LIVING (ADL): BATHING_REQUIRES_ASSISTANCE: 0

## 2022-06-07 ASSESSMENT — ENCOUNTER SYMPTOMS: GENERAL WELL-BEING: GOOD

## 2022-06-07 ASSESSMENT — PATIENT HEALTH QUESTIONNAIRE - PHQ9: CLINICAL INTERPRETATION OF PHQ2 SCORE: 0

## 2022-06-07 ASSESSMENT — FIBROSIS 4 INDEX: FIB4 SCORE: 1.5

## 2022-06-07 NOTE — PROGRESS NOTES
Chief Complaint   Patient presents with   • Annual Wellness Visit       HPI:  Keisha Lyle is a 67 y.o. here for Medicare Annual Wellness Visit     Patient Active Problem List    Diagnosis Date Noted   • PVC (premature ventricular contraction) 03/31/2021   • Palpitations 03/10/2021   • Dupuytren's disease 04/03/2019   • Tinnitus of both ears 04/03/2019   • Hypercalcemia 09/05/2018   • Vitamin D deficiency 09/05/2018   • Screening for colon cancer 03/02/2018   • Annual physical exam 03/02/2018   • Decreased libido 03/02/2018   • Need for vaccination 03/02/2018   • Inflamed seborrheic keratosis 03/02/2018   • AK (actinic keratosis) 03/02/2018   • GERD (gastroesophageal reflux disease) 10/22/2015   • Mitral valve disorder 09/08/2014   • Epigastric pain 03/18/2014   • HTN (hypertension) 03/17/2014   • Hypokalemia 03/16/2014   • Lower urinary tract infectious disease 03/16/2014       Current Outpatient Medications   Medication Sig Dispense Refill   • losartan (COZAAR) 100 MG Tab Take 1 Tablet by mouth every day. 100 Tablet 0   • estradiol (VIVELLE) 0.0375 MG/24HR patch Place 1 Patch on the skin.     • metoprolol SR (TOPROL XL) 25 MG TABLET SR 24 HR Take 1 Tablet by mouth every day. 90 Tablet 3   • ondansetron (ZOFRAN) 8 MG Tab TAKE 1 TABLET BY MOUTH EVERY 8 HOURS AS NEEDED FOR NAUSEA/VOMITING 15 Tab 0   • Ascorbic Acid (VITAMIN C CR) 1500 MG Tab CR Take  by mouth.     • vitamin D (CHOLECALCIFEROL) 1000 UNIT Tab Take 1,000 Units by mouth every day.     • omeprazole (PRILOSEC) 20 MG delayed-release capsule Take 1 Cap by mouth 2 times a day. Indications: **OTC**     • multivitamin (THERAGRAN) TABS Take 1 Tab by mouth every day. Indications: **OTC**     • docosahexanoic acid (OMEGA 3 FA) 1000 MG CAPS Take 1,000 mg by mouth 2 Times a Day. Indications: **OTC**       No current facility-administered medications for this visit.          Current supplements as per medication list.     Allergies: Erythromycin and Inapsine  [droperidol]    Current social contact/activities: horses, riding, , fish care    She  reports that she has never smoked. She has never used smokeless tobacco. She reports current alcohol use of about 8.4 oz of alcohol per week. She reports that she does not use drugs.  Counseling given: Not Answered      DPA/Advanced Directive:  Patient has Advanced Directive, but it is not on file. Instructed to bring in a copy to scan into their chart.    ROS:    Gait: Uses no assistive device  Ostomy: No  Other tubes: No  Amputations: No  Chronic oxygen use: No  Last eye exam: 2019  Wears hearing aids: No   : Denies any urinary leakage during the last 6 months    Screening:  Annual    Depression Screening  Little interest or pleasure in doing things?  0 - not at all  Feeling down, depressed , or hopeless? 0 - not at all  Patient Health Questionnaire Score: 0     If depressive symptoms identified deferred to follow up visit unless specifically addressed in assessment and plan.    Interpretation of PHQ-9 Total Score   Score Severity   1-4 No Depression   5-9 Mild Depression   10-14 Moderate Depression   15-19 Moderately Severe Depression   20-27 Severe Depression    Screening for Cognitive Impairment  Three Minute Recall (daughter, heaven, omari) 2/3    Александр clock face with all 12 numbers and set the hands to show 10 past 11.  Yes    Cognitive concerns identified deferred for follow up unless specifically addressed in assessment and plan.    Fall Risk Assessment  Has the patient had two or more falls in the last year or any fall with injury in the last year?  No    Safety Assessment  Throw rugs on floor.  No  Handrails on all stairs.  No  Good lighting in all hallways.  No  Difficulty hearing.  No  Patient counseled about all safety risks that were identified.    Functional Assessment ADLs  Are there any barriers preventing you from cooking for yourself or meeting nutritional needs?  No.    Are there any barriers  preventing you from driving safely or obtaining transportation?  No.    Are there any barriers preventing you from using a telephone or calling for help?  No.    Are there any barriers preventing you from shopping?  No.    Are there any barriers preventing you from taking care of your own finances?  No.    Are there any barriers preventing you from managing your medications?  No.    Are there any barriers preventing you from showering, bathing or dressing yourself?  No.    Are you currently engaging in any exercise or physical activity?  Yes.     What is your perception of your health?  Good.      Health Maintenance Summary          Overdue - COLORECTAL CANCER SCREENING (COLONOSCOPY - Yearly) Overdue since 6/13/2020 06/13/2019  REFERRAL TO GI FOR COLONOSCOPY    05/25/2018  REFERRAL TO GI FOR COLONOSCOPY          Overdue - IMM PNEUMOCOCCAL VACCINE: 65+ Years (2 - PCV) Overdue since 3/2/2022    03/02/2021  Imm Admin: Pneumococcal polysaccharide vaccine (PPSV-23)    11/24/2014  Imm Admin: Pneumococcal polysaccharide vaccine (PPSV-23)    10/01/2014  Imm Admin: Pneumococcal Vaccine (PCV7) - HISTORICAL DATA          MAMMOGRAM (Yearly) Due soon on 7/6/2022 07/06/2021  MA-SCREENING MAMMO BILAT W/TOMOSYNTHESIS W/CAD    06/25/2020  MA-SCREENING MAMMO BILAT W/TOMOSYNTHESIS W/CAD    01/31/2019  MA-SCREENING MAMMO BILAT W/TOMOSYNTHESIS W/CAD    12/21/2017  MA-MAMMO SCREENING BILAT W/HITESH W/CAD    11/28/2016  MA-SCREEN MAMMO W/CAD-BILAT    Only the first 5 history entries have been loaded, but more history exists.          BONE DENSITY (Every 5 Years) Next due on 3/30/2026    03/30/2021  DS-BONE DENSITY STUDY (DEXA)          IMM DTaP/Tdap/Td Vaccine (2 - Td or Tdap) Next due on 3/2/2028    03/02/2018  Imm Admin: Tdap Vaccine          HEPATITIS C SCREENING  Completed    03/18/2014  HEPATITIS PANEL ACUTE(4 COMPONENTS)    01/23/2007  HEP C VIRUS ANTIBODY    03/15/2006  HEP C VIRUS ANTIBODY          IMM ZOSTER VACCINES (Series  Information) Completed    09/08/2018  Imm Admin: Zoster Vaccine Recombinant (RZV) (SHINGRIX)    05/22/2018  Imm Admin: Zoster Vaccine Recombinant (RZV) (SHINGRIX)          IMM INFLUENZA (Series Information) Completed    08/28/2021  Imm Admin: Influenza Vaccine Adult HD    08/29/2020  Imm Admin: Influenza, Unspecified - HISTORICAL DATA    10/02/2019  Imm Admin: Influenza Vaccine Quad Inj (Pf)    10/02/2019  Imm Admin: Influenza, Unspecified - HISTORICAL DATA    09/05/2018  Imm Admin: Influenza Vaccine Quad Inj (Pf)    Only the first 5 history entries have been loaded, but more history exists.          COVID-19 Vaccine (Series Information) Completed    02/26/2022  Imm Admin: Moderna SARS-CoV-2 Vaccine    08/31/2021  Imm Admin: Moderna SARS-CoV-2 Vaccine    01/20/2021  Imm Admin: Moderna SARS-CoV-2 Vaccine    12/22/2020  Imm Admin: Moderna SARS-CoV-2 Vaccine          Annual Wellness Visit  Completed    06/07/2022  Visit Dx: Medicare annual wellness visit, initial    02/16/2021  Visit Dx: Medicare annual wellness visit, initial          IMM HEP B VACCINE (Series Information) Aged Out    No completion history exists for this topic.          IMM MENINGOCOCCAL VACCINE (MCV4) (Series Information) Aged Out    No completion history exists for this topic.          Discontinued - PAP SMEAR  Discontinued    No completion history exists for this topic.                Patient Care Team:  Cornelius Andrew M.D. as PCP - General (Family Medicine)  Cornelius Andrew M.D. as PCP - [facility] Paneled  Jacqueline Arias M.D. as Consulting Physician (OB & Gyn - Gynecology)  Clarence Garcia M.D. as Consulting Physician (Cardiovascular Disease (Cardiology))        Social History     Tobacco Use   • Smoking status: Never Smoker   • Smokeless tobacco: Never Used   Vaping Use   • Vaping Use: Never used   Substance Use Topics   • Alcohol use: Yes     Alcohol/week: 8.4 oz     Types: 14 Glasses of wine per week     Comment: 1-2 glasses every day   • Drug  "use: Never     Family History   Problem Relation Age of Onset   • Cancer Paternal Grandmother      She  has a past medical history of Hypertension.    She has no past medical history of Breast cancer (HCC).   Past Surgical History:   Procedure Laterality Date   • GYN SURGERY      hysterectomy   • OTHER      tonsillectomy   • OTHER ABDOMINAL SURGERY      appy       Exam:   /66 (BP Location: Right arm, Patient Position: Sitting, BP Cuff Size: Adult)   Pulse 86   Temp 36.8 °C (98.2 °F) (Temporal)   Ht 1.702 m (5' 7\")   Wt 74.5 kg (164 lb 3.2 oz)   SpO2 97%  Body mass index is 25.72 kg/m².    Hearing excellent.    Dentition good  Alert, oriented in no acute distress.  Eye contact is good, speech goal directed, affect calm    Assessment and Plan. The following treatment and monitoring plan is recommended:    1. Medicare annual wellness visit, initial    2. PVC (premature ventricular contraction)    3. Palpitations    4. Primary hypertension    5. Gastroesophageal reflux disease with esophagitis without hemorrhage    Plan    1. Completed    2. Patient has been stable with current management  We will make no changes for now    3. Patient has been stable with current management  We will make no changes for now    4. Patient has been stable with current management  We will make no changes for now    5. Patient has been stable with current management  We will make no changes for now      Services suggested: No services needed at this time  Health Care Screening: Age-appropriate preventive services recommended by USPTF and ACIP covered by Medicare were discussed today. Services ordered if indicated and agreed upon by the patient.  Referrals offered: Community-based lifestyle interventions to reduce health risks and promote self-management and wellness, fall prevention, nutrition, physical activity, tobacco-use cessation, weight loss, and mental health services as per orders if indicated.    Discussion today about " general wellness and lifestyle habits:    · Prevent falls and reduce trip hazards; Cautioned about securing or removing rugs.  · Have a working fire alarm and carbon monoxide detector;   · Engage in regular physical activity and social activities     Follow-up: Return in about 6 months (around 12/7/2022) for Reevaluation, labs.

## 2022-06-07 NOTE — PROGRESS NOTES
Virtual Visit: Established Patient   This visit was conducted via Zoom using secure and encrypted videoconferencing technology.   The patient was in their home in the [facility].    The patient's identity was confirmed and verbal consent was obtained for this virtual visit.     Subjective:   CC:   Chief Complaint   Patient presents with   • Annual Wellness Visit       Keisha Lyle is a 67 y.o. female presenting for evaluation and management of:    1. PVC (premature ventricular contraction)  2. 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  She states that her palpitations are completely resolved since being on the metoprolol XL 25 mg p.o. daily.  She states that she would like to just get the medication from primary care so she has less appointments.  She denies any side effects such as fatigue presyncope syncopal episodes.    3. Primary hypertension    Losartan 100 mg p.o. daily      Overall the patient has been compliant with his medical regimen, denies any adverse side effects such as cough, no syncope, no presyncope, no excessive fatigue.  The patient denies any chest pain today no headaches.      4. Gastroesophageal reflux disease with esophagitis without hemorrhage  Omeprazole 20 mg p.o. twice daily    Patient denies any difficulty swallowing, no regurgitation, no eructation, no weight loss, no nocturnal asthma no food getting stuck in the chest.        ROS       Current medicines (including changes today)  Current Outpatient Medications   Medication Sig Dispense Refill   • losartan (COZAAR) 100 MG Tab Take 1 Tablet by mouth every day. 100 Tablet 0   • estradiol (VIVELLE) 0.0375 MG/24HR patch Place 1 Patch on the skin.     • metoprolol SR (TOPROL XL) 25 MG TABLET SR 24 HR Take 1 Tablet by mouth every day. 90 Tablet 3   • ondansetron (ZOFRAN) 8 MG Tab TAKE 1 TABLET BY MOUTH EVERY 8 HOURS AS NEEDED FOR NAUSEA/VOMITING 15 Tab 0   •  "Ascorbic Acid (VITAMIN C CR) 1500 MG Tab CR Take  by mouth.     • vitamin D (CHOLECALCIFEROL) 1000 UNIT Tab Take 1,000 Units by mouth every day.     • omeprazole (PRILOSEC) 20 MG delayed-release capsule Take 1 Cap by mouth 2 times a day. Indications: **OTC**     • multivitamin (THERAGRAN) TABS Take 1 Tab by mouth every day. Indications: **OTC**     • docosahexanoic acid (OMEGA 3 FA) 1000 MG CAPS Take 1,000 mg by mouth 2 Times a Day. Indications: **OTC**       No current facility-administered medications for this visit.       Patient Active Problem List    Diagnosis Date Noted   • PVC (premature ventricular contraction) 03/31/2021   • Palpitations 03/10/2021   • Dupuytren's disease 04/03/2019   • Tinnitus of both ears 04/03/2019   • Hypercalcemia 09/05/2018   • Vitamin D deficiency 09/05/2018   • Screening for colon cancer 03/02/2018   • Annual physical exam 03/02/2018   • Decreased libido 03/02/2018   • Need for vaccination 03/02/2018   • Inflamed seborrheic keratosis 03/02/2018   • AK (actinic keratosis) 03/02/2018   • GERD (gastroesophageal reflux disease) 10/22/2015   • Mitral valve disorder 09/08/2014   • Epigastric pain 03/18/2014   • HTN (hypertension) 03/17/2014   • Hypokalemia 03/16/2014   • Lower urinary tract infectious disease 03/16/2014        Objective:   /66 (BP Location: Right arm, Patient Position: Sitting, BP Cuff Size: Adult)   Pulse 86   Temp 36.8 °C (98.2 °F) (Temporal)   Ht 1.702 m (5' 7\")   Wt 74.5 kg (164 lb 3.2 oz)   SpO2 97%   BMI 25.72 kg/m²     Physical Exam:  Constitutional: Alert, no distress, well-groomed.  Skin: No rashes in visible areas.  Eye: Round. Conjunctiva clear, lids normal. No icterus.   ENMT: Lips pink without lesions, good dentition, moist mucous membranes. Phonation normal.  Neck: No masses, no thyromegaly. Moves freely without pain.  Respiratory: Unlabored respiratory effort, no cough or audible wheeze  Psych: Alert and oriented x3, normal affect and mood. "     Assessment and Plan:   The following treatment plan was discussed:     1. PVC (premature ventricular contraction)  2. Palpitations    Patient has been stable with current management  We will make no changes for now      Continue to avoid caffeinated  beverages  Avoid etoh and tobacco  Make certain that you get adequate rest at night        3. Primary hypertension    Patient has been stable with current management  We will make no changes for now    4. Gastroesophageal reflux disease with esophagitis without hemorrhage    Patient has been stable with current management  We will make no changes for now      Follow-up: Return in about 6 months (around 12/7/2022) for Reevaluation, labs.

## 2022-07-08 ENCOUNTER — HOSPITAL ENCOUNTER (OUTPATIENT)
Dept: RADIOLOGY | Facility: MEDICAL CENTER | Age: 67
End: 2022-07-08
Attending: FAMILY MEDICINE
Payer: MEDICARE

## 2022-07-08 ENCOUNTER — OFFICE VISIT (OUTPATIENT)
Dept: URGENT CARE | Facility: CLINIC | Age: 67
End: 2022-07-08
Payer: MEDICARE

## 2022-07-08 VITALS
SYSTOLIC BLOOD PRESSURE: 120 MMHG | TEMPERATURE: 99 F | DIASTOLIC BLOOD PRESSURE: 82 MMHG | RESPIRATION RATE: 14 BRPM | BODY MASS INDEX: 25.43 KG/M2 | HEART RATE: 86 BPM | WEIGHT: 162 LBS | OXYGEN SATURATION: 94 % | HEIGHT: 67 IN

## 2022-07-08 DIAGNOSIS — J01.00 ACUTE MAXILLARY SINUSITIS, RECURRENCE NOT SPECIFIED: ICD-10-CM

## 2022-07-08 DIAGNOSIS — Z12.31 VISIT FOR SCREENING MAMMOGRAM: ICD-10-CM

## 2022-07-08 DIAGNOSIS — J98.8 RTI (RESPIRATORY TRACT INFECTION): ICD-10-CM

## 2022-07-08 PROCEDURE — 99213 OFFICE O/P EST LOW 20 MIN: CPT | Performed by: PHYSICIAN ASSISTANT

## 2022-07-08 PROCEDURE — 77063 BREAST TOMOSYNTHESIS BI: CPT

## 2022-07-08 RX ORDER — METHYLPREDNISOLONE 4 MG/1
TABLET ORAL
Qty: 21 TABLET | Refills: 0 | Status: SHIPPED | OUTPATIENT
Start: 2022-07-08 | End: 2023-07-13

## 2022-07-08 RX ORDER — DOXYCYCLINE HYCLATE 100 MG
100 TABLET ORAL 2 TIMES DAILY
Qty: 14 TABLET | Refills: 0 | Status: SHIPPED | OUTPATIENT
Start: 2022-07-08 | End: 2022-07-15

## 2022-07-08 RX ORDER — BENZONATATE 200 MG/1
200 CAPSULE ORAL 3 TIMES DAILY PRN
Qty: 30 CAPSULE | Refills: 0 | Status: SHIPPED | OUTPATIENT
Start: 2022-07-08 | End: 2023-07-13

## 2022-07-08 ASSESSMENT — ENCOUNTER SYMPTOMS
MYALGIAS: 0
COUGH: 1
CHILLS: 0
SORE THROAT: 1
SHORTNESS OF BREATH: 0
ABDOMINAL PAIN: 0
RHINORRHEA: 1
WHEEZING: 1
DIARRHEA: 0
HEADACHES: 0
FEVER: 1
SINUS PAIN: 1
SWEATS: 0
VOMITING: 0
SPUTUM PRODUCTION: 1
HEMOPTYSIS: 0
NAUSEA: 0

## 2022-07-08 ASSESSMENT — FIBROSIS 4 INDEX: FIB4 SCORE: 1.5

## 2022-07-08 NOTE — PROGRESS NOTES
Subjective     Keisha Lyle is a 67 y.o. female who presents with URI (Pt states began 25x days ago, post nasal drip, congestion, sinus infection concern )            Cough  This is a new problem. Episode onset: 25 days  The problem has been unchanged. The problem occurs constantly. The cough is productive of sputum. Associated symptoms include a fever (first week ), nasal congestion, postnasal drip, rhinorrhea, a sore throat and wheezing (some wheezing in the morning ). Pertinent negatives include no chest pain, chills, ear congestion, ear pain, headaches, hemoptysis, myalgias, shortness of breath or sweats. Associated symptoms comments: Sinus pressure right side . The symptoms are aggravated by lying down. She has tried a beta-agonist inhaler and OTC cough suppressant (saline rinses, nasal spray) for the symptoms. The treatment provided no relief.     The patient reports she has taken 3 COVID tests- all of which have come back negative. Mucous is green at times.    Past Medical History:   Diagnosis Date   • Hypertension     medicated       Past Surgical History:   Procedure Laterality Date   • GYN SURGERY      hysterectomy   • OTHER      tonsillectomy   • OTHER ABDOMINAL SURGERY      appy       Family History   Problem Relation Age of Onset   • Cancer Paternal Grandmother        Allergies   Allergen Reactions   • Erythromycin Hives and Vomiting     Rxn = unknown - years ago    • Inapsine [Droperidol]      agitated       Medications, Allergies, and current problem list reviewed today in Epic    Review of Systems   Constitutional: Positive for fever (first week ) and malaise/fatigue. Negative for chills.   HENT: Positive for congestion, postnasal drip, rhinorrhea, sinus pain and sore throat. Negative for ear discharge and ear pain.    Respiratory: Positive for cough, sputum production and wheezing (some wheezing in the morning ). Negative for hemoptysis and shortness of breath.    Cardiovascular: Negative for  "chest pain and leg swelling.   Gastrointestinal: Negative for abdominal pain, diarrhea, nausea and vomiting.   Musculoskeletal: Negative for myalgias.   Neurological: Negative for headaches.     All other systems reviewed and are negative.            Objective     /82 (BP Location: Left arm, Patient Position: Sitting, BP Cuff Size: Adult)   Pulse 86   Temp 37.2 °C (99 °F) (Temporal)   Resp 14   Ht 1.702 m (5' 7\")   Wt 73.5 kg (162 lb)   SpO2 94%   BMI 25.37 kg/m²      Physical Exam  Constitutional:       General: She is not in acute distress.     Appearance: Normal appearance. She is not ill-appearing.   HENT:      Head: Normocephalic and atraumatic.      Right Ear: Tympanic membrane, ear canal and external ear normal.      Left Ear: Tympanic membrane, ear canal and external ear normal.      Nose: Mucosal edema, congestion and rhinorrhea present.      Right Sinus: Maxillary sinus tenderness present.      Mouth/Throat:      Mouth: Mucous membranes are moist.      Pharynx: No posterior oropharyngeal erythema.   Eyes:      Conjunctiva/sclera: Conjunctivae normal.   Cardiovascular:      Rate and Rhythm: Normal rate and regular rhythm.      Heart sounds: Normal heart sounds.   Pulmonary:      Effort: Pulmonary effort is normal. No respiratory distress.      Breath sounds: Normal breath sounds. No wheezing, rhonchi or rales.   Lymphadenopathy:      Cervical: No cervical adenopathy.   Skin:     General: Skin is warm and dry.   Neurological:      General: No focal deficit present.      Mental Status: She is alert and oriented to person, place, and time.   Psychiatric:         Mood and Affect: Mood normal.         Behavior: Behavior normal.         Thought Content: Thought content normal.         Judgment: Judgment normal.                             Assessment & Plan        1. RTI (respiratory tract infection)    2. Acute maxillary sinusitis, recurrence not specified    - doxycycline (VIBRAMYCIN) 100 MG Tab; " Take 1 Tablet by mouth 2 times a day for 7 days.  Dispense: 14 Tablet; Refill: 0    - methylPREDNISolone (MEDROL DOSEPAK) 4 MG Tablet Therapy Pack; Follow schedule on package instructions.  Dispense: 21 Tablet; Refill: 0    - benzonatate (TESSALON) 200 MG capsule; Take 1 Capsule by mouth 3 times a day as needed for Cough.  Dispense: 30 Capsule; Refill: 0     Differential diagnoses, Supportive care, and indications for immediate follow-up discussed with patient.   Pathogenesis of diagnosis discussed including typical length and natural progression.   Instructed to return to clinic or nearest emergency department for any change in condition, further concerns, or worsening of symptoms.    The patient demonstrated a good understanding and agreed with the treatment plan.    Laina David P.A.-C.

## 2022-07-25 DIAGNOSIS — R00.2 PALPITATIONS: ICD-10-CM

## 2022-07-25 DIAGNOSIS — I49.3 SYMPTOMATIC PVCS: ICD-10-CM

## 2022-07-25 RX ORDER — METOPROLOL SUCCINATE 25 MG/1
25 TABLET, EXTENDED RELEASE ORAL DAILY
Qty: 100 TABLET | Refills: 0 | Status: SHIPPED | OUTPATIENT
Start: 2022-07-25 | End: 2022-11-08

## 2022-07-25 NOTE — TELEPHONE ENCOUNTER
Is the patient due for a refill? Yes    Was the patient seen the past year? Yes    Date of last office visit: 8/20/21    Does the patient have an upcoming appointment?  No       Provider to refill: DA     Does the patients insurance require a 100 day supply?  Yes

## 2022-08-11 ENCOUNTER — TELEPHONE (OUTPATIENT)
Dept: HEALTH INFORMATION MANAGEMENT | Facility: OTHER | Age: 67
End: 2022-08-11

## 2022-08-12 DIAGNOSIS — I10 PRIMARY HYPERTENSION: ICD-10-CM

## 2022-08-12 NOTE — TELEPHONE ENCOUNTER
Is the patient due for a refill? Yes    Was the patient seen the past year? Yes    Date of last office visit: 08/20/21    Does the patient have an upcoming appointment?  No      Provider to refill:DA    Does the patients insurance require a 100 day supply?  Yes

## 2022-08-15 RX ORDER — LOSARTAN POTASSIUM 100 MG/1
100 TABLET ORAL DAILY
Qty: 100 TABLET | Refills: 0 | Status: SHIPPED | OUTPATIENT
Start: 2022-08-15 | End: 2022-11-15

## 2022-08-15 NOTE — TELEPHONE ENCOUNTER
Pt would like to continue follow up with PCP and have them prescribe RX- no longer feels cardio is needed.   [facility]

## 2022-09-21 DIAGNOSIS — Z20.822 EXPOSURE TO COVID-19 VIRUS: ICD-10-CM

## 2022-09-21 RX ORDER — CODEINE PHOSPHATE AND GUAIFENESIN 10; 100 MG/5ML; MG/5ML
5 SOLUTION ORAL 3 TIMES DAILY PRN
Qty: 105 ML | Refills: 0 | Status: SHIPPED | OUTPATIENT
Start: 2022-09-21 | End: 2022-09-21

## 2022-09-21 RX ORDER — CODEINE PHOSPHATE AND GUAIFENESIN 10; 100 MG/5ML; MG/5ML
5 SOLUTION ORAL 3 TIMES DAILY PRN
Qty: 105 ML | Refills: 0 | Status: SHIPPED | OUTPATIENT
Start: 2022-09-21 | End: 2022-09-28

## 2022-09-21 RX ORDER — PREDNISONE 20 MG/1
TABLET ORAL
Qty: 12 TABLET | Refills: 0 | Status: SHIPPED | OUTPATIENT
Start: 2022-09-21 | End: 2022-09-21

## 2022-09-21 RX ORDER — PREDNISONE 20 MG/1
TABLET ORAL
Qty: 12 TABLET | Refills: 0 | Status: SHIPPED | OUTPATIENT
Start: 2022-09-21 | End: 2023-07-13

## 2022-11-05 DIAGNOSIS — R00.2 PALPITATIONS: ICD-10-CM

## 2022-11-05 DIAGNOSIS — I49.3 SYMPTOMATIC PVCS: ICD-10-CM

## 2022-11-07 NOTE — TELEPHONE ENCOUNTER
Is the patient due for a refill? Yes    Was the patient seen the past year? Yes    Date of last office visit: 8/20/21    Does the patient have an upcoming appointment?  No   If yes, When?     Provider to refill:DA    Does the patients insurance require a 100 day supply?  Yes

## 2022-11-08 RX ORDER — METOPROLOL SUCCINATE 25 MG/1
25 TABLET, EXTENDED RELEASE ORAL DAILY
Qty: 100 TABLET | Refills: 0 | Status: SHIPPED | OUTPATIENT
Start: 2022-11-08 | End: 2023-02-07 | Stop reason: SDUPTHER

## 2022-11-09 NOTE — TELEPHONE ENCOUNTER
Sent PT MyChart reminder to schedule- called PT and phone was answered but it sounded like PT was in an airport terminal and PT did not respond. [facility]

## 2022-11-13 DIAGNOSIS — I10 PRIMARY HYPERTENSION: ICD-10-CM

## 2022-11-15 RX ORDER — LOSARTAN POTASSIUM 100 MG/1
100 TABLET ORAL DAILY
Qty: 100 TABLET | Refills: 0 | Status: SHIPPED | OUTPATIENT
Start: 2022-11-15 | End: 2023-02-07 | Stop reason: SDUPTHER

## 2022-12-07 ENCOUNTER — OFFICE VISIT (OUTPATIENT)
Dept: MEDICAL GROUP | Age: 67
End: 2022-12-07
Payer: MEDICARE

## 2022-12-07 VITALS
WEIGHT: 174.4 LBS | OXYGEN SATURATION: 94 % | RESPIRATION RATE: 16 BRPM | SYSTOLIC BLOOD PRESSURE: 118 MMHG | BODY MASS INDEX: 27.37 KG/M2 | HEART RATE: 90 BPM | TEMPERATURE: 97.6 F | HEIGHT: 67 IN | DIASTOLIC BLOOD PRESSURE: 74 MMHG

## 2022-12-07 DIAGNOSIS — L57.0 AK (ACTINIC KERATOSIS): ICD-10-CM

## 2022-12-07 DIAGNOSIS — L82.0 INFLAMED SEBORRHEIC KERATOSIS: ICD-10-CM

## 2022-12-07 PROCEDURE — 17003 DESTRUCT PREMALG LES 2-14: CPT | Performed by: FAMILY MEDICINE

## 2022-12-07 PROCEDURE — 17110 DESTRUCTION B9 LES UP TO 14: CPT | Mod: 59 | Performed by: FAMILY MEDICINE

## 2022-12-07 PROCEDURE — 17000 DESTRUCT PREMALG LESION: CPT | Performed by: FAMILY MEDICINE

## 2022-12-07 RX ORDER — ESTRADIOL 0.03 MG/D
FILM, EXTENDED RELEASE TRANSDERMAL
COMMUNITY
Start: 2022-11-03 | End: 2023-07-13

## 2022-12-07 ASSESSMENT — FIBROSIS 4 INDEX: FIB4 SCORE: 1.5

## 2022-12-07 NOTE — PROGRESS NOTES
This medical record contains text that has been entered with the assistance of computer voice recognition and dictation software.  Therefore, it may contain unintended errors in text, spelling, punctuation, or grammar      Chief Complaint   Patient presents with    Recurrent Skin Infections         Keisha Lyle is a 67 y.o. female here evaluation and management of: Sun spots      HPI:           1. AK (actinic keratosis)  Patient states she had great results with cryotherapy in the past but she has more lesions on her chest red spots that are in sun exposed areas that she is concerned about.     2. Inflamed seborrheic keratosis  Also has some irritating spots on her back which get caught on her bra strap.    Current medicines (including changes today)  Current Outpatient Medications   Medication Sig Dispense Refill    Estradiol 0.025 MG/24HR PATCH BIWEEKLY       losartan (COZAAR) 100 MG Tab TAKE 1 TABLET BY MOUTH EVERY  Tablet 0    metoprolol SR (TOPROL XL) 25 MG TABLET SR 24 HR TAKE 1 TABLET BY MOUTH EVERY  Tablet 0    ondansetron (ZOFRAN) 8 MG Tab TAKE 1 TABLET BY MOUTH EVERY 8 HOURS AS NEEDED FOR NAUSEA/VOMITING 15 Tab 0    Ascorbic Acid (VITAMIN C CR) 1500 MG Tab CR Take  by mouth.      vitamin D (CHOLECALCIFEROL) 1000 UNIT Tab Take 1,000 Units by mouth every day.      omeprazole (PRILOSEC) 20 MG delayed-release capsule Take 1 Cap by mouth 2 times a day. Indications: **OTC**      multivitamin (THERAGRAN) TABS Take 1 Tab by mouth every day. Indications: **OTC**      docosahexanoic acid (OMEGA 3 FA) 1000 MG CAPS Take 1,000 mg by mouth 2 Times a Day. Indications: **OTC**      Nirmatrelvir&Ritonavir 300/100 20 x 150 MG & 10 x 100MG Tablet Therapy Pack Take 300 mg nirmatrelvir (two 150 mg tablets) with 100 mg ritonavir (one 100 mg tablet) by mouth, with all three tablets taken together twice daily for 5 days. 30 Each 0    predniSONE (DELTASONE) 20 MG Tab Take 3 tabs po for 2 days then 2 tabs for 2  "days then 1 for 2 days 12 Tablet 0    Zinc Acetate, Oral, 50 MG Cap Take 1 Capsule by mouth 2 times a day. 14 Capsule 0    methylPREDNISolone (MEDROL DOSEPAK) 4 MG Tablet Therapy Pack Follow schedule on package instructions. 21 Tablet 0    benzonatate (TESSALON) 200 MG capsule Take 1 Capsule by mouth 3 times a day as needed for Cough. 30 Capsule 0    estradiol (VIVELLE) 0.0375 MG/24HR patch Place 1 Patch on the skin. (Patient not taking: Reported on 12/7/2022)       No current facility-administered medications for this visit.     She  has a past medical history of Hypertension.    She has no past medical history of Breast cancer (HCC).  She  has a past surgical history that includes other abdominal surgery; gyn surgery; and other.  Social History     Tobacco Use    Smoking status: Never    Smokeless tobacco: Never   Vaping Use    Vaping Use: Never used   Substance Use Topics    Alcohol use: Yes     Alcohol/week: 8.4 oz     Types: 14 Glasses of wine per week     Comment: 1-2 glasses every day    Drug use: Never     Social History     Social History Narrative    Not on file     Family History   Problem Relation Age of Onset    Cancer Paternal Grandmother      Family Status   Relation Name Status    PGMo  (Not Specified)    Mo  Alive    Fa  Alive    Sis  Alive    Bro  Alive    Bro  Alive    Bro  Alive         ROS    The pertinent  ROS findings can be seen in the HPI above.     All other systems reviewed and are negative     Objective:     /74   Pulse 90   Temp 36.4 °C (97.6 °F) (Temporal)   Resp 16   Ht 1.702 m (5' 7\")   Wt 79.1 kg (174 lb 6.4 oz)   SpO2 94%  Body mass index is 27.31 kg/m².      Physical Exam:    Constitutional: Alert, no distress.  Skin:   SKIN EXAM    ISK  Description--4 irregular, pigmented, verrucous surface plaques on her back ranging from 8mm to 2 cm      AK  5 lesions on chest with evidence of of tender, pink, scaly with solar damage present , spotty hyperpigmentation, scattered " telangiectasias, and  xerosis      PROCEDURE: CRYOTHERAPY  Discussed risks and benefits of cryotherapy including but not limited to scarring, hyperpigmentation, hypopigmentation, hypertrophic scarring, keiloid scarring, incomplete or no resolution of lesions treated,pain, undesirable cosemetic result, blistering, potential need for additional treatment including more invasive treatment. Patient expresses understanding and verbally acknowledges risks and consent to treatment. 2  applications of cryotherapy with 3 second freeze thaw cycle was applied to the above lesions.  Patient tolerated procedure well. There were no complications. Aftercare instructions given.         Assessment and Plan:   The following treatment plan was discussed      1. AK (actinic keratosis)    Patient tolerated procedure well  There were no adverse events  Patient was given post procedure precautions     2. Inflamed seborrheic keratosis    Patient tolerated procedure well  There were no adverse events  Patient was given post procedure precautions     Other orders  - Estradiol 0.025 MG/24HR PATCH BIWEEKLY             Instructed to Follow up in clinic or ER for worsening symptoms, difficulty breathing, lack of expected recovery, or should new symptoms or problems arise.    Followup: Return in about 6 months (around 6/7/2023) for Reevaluation.

## 2023-02-07 ENCOUNTER — PATIENT MESSAGE (OUTPATIENT)
Dept: MEDICAL GROUP | Age: 68
End: 2023-02-07
Payer: MEDICARE

## 2023-02-07 DIAGNOSIS — I10 PRIMARY HYPERTENSION: ICD-10-CM

## 2023-02-07 DIAGNOSIS — R00.2 PALPITATIONS: ICD-10-CM

## 2023-02-07 DIAGNOSIS — I49.3 SYMPTOMATIC PVCS: ICD-10-CM

## 2023-02-07 NOTE — PATIENT COMMUNICATION
Received request via: Patient    Was the patient seen in the last year in this department? Yes    Does the patient have an active prescription (recently filled or refills available) for medication(s) requested? No    Does the patient have [facility] and need 100 day supply (blood pressure, diabetes and cholesterol meds only)? Yes, quantity updated to 100 days    Requested Prescriptions     Pending Prescriptions Disp Refills    metoprolol SR (TOPROL XL) 25 MG TABLET SR 24  Tablet 2     Sig: Take 1 Tablet by mouth every day.    losartan (COZAAR) 100 MG Tab 100 Tablet 2     Sig: Take 1 Tablet by mouth every day.

## 2023-02-09 RX ORDER — METOPROLOL SUCCINATE 25 MG/1
25 TABLET, EXTENDED RELEASE ORAL DAILY
Qty: 100 TABLET | Refills: 2 | Status: SHIPPED | OUTPATIENT
Start: 2023-02-09 | End: 2023-08-10 | Stop reason: SDUPTHER

## 2023-02-09 RX ORDER — LOSARTAN POTASSIUM 100 MG/1
100 TABLET ORAL DAILY
Qty: 100 TABLET | Refills: 2 | Status: SHIPPED | OUTPATIENT
Start: 2023-02-09 | End: 2023-12-12

## 2023-06-19 ENCOUNTER — PATIENT MESSAGE (OUTPATIENT)
Dept: CARDIOLOGY | Facility: MEDICAL CENTER | Age: 68
End: 2023-06-19
Payer: MEDICARE

## 2023-06-19 DIAGNOSIS — I49.3 SYMPTOMATIC PVCS: ICD-10-CM

## 2023-06-19 DIAGNOSIS — R00.2 PALPITATIONS: ICD-10-CM

## 2023-06-22 ENCOUNTER — PATIENT MESSAGE (OUTPATIENT)
Dept: MEDICAL GROUP | Age: 68
End: 2023-06-22
Payer: MEDICARE

## 2023-06-22 DIAGNOSIS — E78.00 PURE HYPERCHOLESTEROLEMIA: ICD-10-CM

## 2023-06-22 DIAGNOSIS — E55.9 VITAMIN D DEFICIENCY: ICD-10-CM

## 2023-06-30 ENCOUNTER — TELEPHONE (OUTPATIENT)
Dept: HEALTH INFORMATION MANAGEMENT | Facility: OTHER | Age: 68
End: 2023-06-30
Payer: MEDICARE

## 2023-07-05 ENCOUNTER — TELEPHONE (OUTPATIENT)
Dept: HEALTH INFORMATION MANAGEMENT | Facility: OTHER | Age: 68
End: 2023-07-05
Payer: MEDICARE

## 2023-07-10 ENCOUNTER — HOSPITAL ENCOUNTER (OUTPATIENT)
Dept: LAB | Facility: MEDICAL CENTER | Age: 68
End: 2023-07-10
Attending: FAMILY MEDICINE
Payer: MEDICARE

## 2023-07-10 ENCOUNTER — HOSPITAL ENCOUNTER (OUTPATIENT)
Dept: RADIOLOGY | Facility: MEDICAL CENTER | Age: 68
End: 2023-07-10
Attending: FAMILY MEDICINE
Payer: MEDICARE

## 2023-07-10 DIAGNOSIS — Z12.31 VISIT FOR SCREENING MAMMOGRAM: ICD-10-CM

## 2023-07-10 DIAGNOSIS — E55.9 VITAMIN D DEFICIENCY: ICD-10-CM

## 2023-07-10 DIAGNOSIS — E78.00 PURE HYPERCHOLESTEROLEMIA: ICD-10-CM

## 2023-07-10 LAB
ALBUMIN SERPL BCP-MCNC: 4.5 G/DL (ref 3.2–4.9)
ALBUMIN/GLOB SERPL: 1.8 G/DL
ALP SERPL-CCNC: 66 U/L (ref 30–99)
ALT SERPL-CCNC: 42 U/L (ref 2–50)
ANION GAP SERPL CALC-SCNC: 14 MMOL/L (ref 7–16)
AST SERPL-CCNC: 45 U/L (ref 12–45)
BASOPHILS # BLD AUTO: 1.1 % (ref 0–1.8)
BASOPHILS # BLD: 0.07 K/UL (ref 0–0.12)
BILIRUB SERPL-MCNC: 0.4 MG/DL (ref 0.1–1.5)
BUN SERPL-MCNC: 13 MG/DL (ref 8–22)
CALCIUM ALBUM COR SERPL-MCNC: 9.5 MG/DL (ref 8.5–10.5)
CALCIUM SERPL-MCNC: 9.9 MG/DL (ref 8.5–10.5)
CHLORIDE SERPL-SCNC: 103 MMOL/L (ref 96–112)
CHOLEST SERPL-MCNC: 197 MG/DL (ref 100–199)
CO2 SERPL-SCNC: 23 MMOL/L (ref 20–33)
CREAT SERPL-MCNC: 0.74 MG/DL (ref 0.5–1.4)
EOSINOPHIL # BLD AUTO: 0.16 K/UL (ref 0–0.51)
EOSINOPHIL NFR BLD: 2.5 % (ref 0–6.9)
ERYTHROCYTE [DISTWIDTH] IN BLOOD BY AUTOMATED COUNT: 45.6 FL (ref 35.9–50)
FASTING STATUS PATIENT QL REPORTED: NORMAL
GFR SERPLBLD CREATININE-BSD FMLA CKD-EPI: 88 ML/MIN/1.73 M 2
GLOBULIN SER CALC-MCNC: 2.5 G/DL (ref 1.9–3.5)
GLUCOSE SERPL-MCNC: 93 MG/DL (ref 65–99)
HCT VFR BLD AUTO: 44.4 % (ref 37–47)
HDLC SERPL-MCNC: 101 MG/DL
HGB BLD-MCNC: 15 G/DL (ref 12–16)
IMM GRANULOCYTES # BLD AUTO: 0.03 K/UL (ref 0–0.11)
IMM GRANULOCYTES NFR BLD AUTO: 0.5 % (ref 0–0.9)
LDLC SERPL CALC-MCNC: 86 MG/DL
LYMPHOCYTES # BLD AUTO: 2.18 K/UL (ref 1–4.8)
LYMPHOCYTES NFR BLD: 34.4 % (ref 22–41)
MCH RBC QN AUTO: 32.2 PG (ref 27–33)
MCHC RBC AUTO-ENTMCNC: 33.8 G/DL (ref 32.2–35.5)
MCV RBC AUTO: 95.3 FL (ref 81.4–97.8)
MONOCYTES # BLD AUTO: 0.69 K/UL (ref 0–0.85)
MONOCYTES NFR BLD AUTO: 10.9 % (ref 0–13.4)
NEUTROPHILS # BLD AUTO: 3.2 K/UL (ref 1.82–7.42)
NEUTROPHILS NFR BLD: 50.6 % (ref 44–72)
NRBC # BLD AUTO: 0 K/UL
NRBC BLD-RTO: 0 /100 WBC (ref 0–0.2)
PLATELET # BLD AUTO: 269 K/UL (ref 164–446)
PMV BLD AUTO: 9 FL (ref 9–12.9)
POTASSIUM SERPL-SCNC: 4.2 MMOL/L (ref 3.6–5.5)
PROT SERPL-MCNC: 7 G/DL (ref 6–8.2)
RBC # BLD AUTO: 4.66 M/UL (ref 4.2–5.4)
SODIUM SERPL-SCNC: 140 MMOL/L (ref 135–145)
TRIGL SERPL-MCNC: 52 MG/DL (ref 0–149)
WBC # BLD AUTO: 6.3 K/UL (ref 4.8–10.8)

## 2023-07-10 PROCEDURE — 36415 COLL VENOUS BLD VENIPUNCTURE: CPT

## 2023-07-10 PROCEDURE — 82306 VITAMIN D 25 HYDROXY: CPT

## 2023-07-10 PROCEDURE — 85025 COMPLETE CBC W/AUTO DIFF WBC: CPT

## 2023-07-10 PROCEDURE — 84443 ASSAY THYROID STIM HORMONE: CPT

## 2023-07-10 PROCEDURE — 84439 ASSAY OF FREE THYROXINE: CPT

## 2023-07-10 PROCEDURE — 80061 LIPID PANEL: CPT

## 2023-07-10 PROCEDURE — 84481 FREE ASSAY (FT-3): CPT

## 2023-07-10 PROCEDURE — 80053 COMPREHEN METABOLIC PANEL: CPT

## 2023-07-10 PROCEDURE — 77063 BREAST TOMOSYNTHESIS BI: CPT

## 2023-07-10 SDOH — ECONOMIC STABILITY: HOUSING INSECURITY
IN THE LAST 12 MONTHS, WAS THERE A TIME WHEN YOU DID NOT HAVE A STEADY PLACE TO SLEEP OR SLEPT IN A SHELTER (INCLUDING NOW)?: NO

## 2023-07-10 SDOH — ECONOMIC STABILITY: HOUSING INSECURITY: IN THE LAST 12 MONTHS, HOW MANY PLACES HAVE YOU LIVED?: 1

## 2023-07-10 SDOH — ECONOMIC STABILITY: INCOME INSECURITY: HOW HARD IS IT FOR YOU TO PAY FOR THE VERY BASICS LIKE FOOD, HOUSING, MEDICAL CARE, AND HEATING?: NOT HARD AT ALL

## 2023-07-10 SDOH — ECONOMIC STABILITY: TRANSPORTATION INSECURITY
IN THE PAST 12 MONTHS, HAS THE LACK OF TRANSPORTATION KEPT YOU FROM MEDICAL APPOINTMENTS OR FROM GETTING MEDICATIONS?: NO

## 2023-07-10 SDOH — ECONOMIC STABILITY: HOUSING INSECURITY

## 2023-07-10 SDOH — ECONOMIC STABILITY: FOOD INSECURITY: WITHIN THE PAST 12 MONTHS, YOU WORRIED THAT YOUR FOOD WOULD RUN OUT BEFORE YOU GOT MONEY TO BUY MORE.: NEVER TRUE

## 2023-07-10 SDOH — ECONOMIC STABILITY: TRANSPORTATION INSECURITY
IN THE PAST 12 MONTHS, HAS LACK OF RELIABLE TRANSPORTATION KEPT YOU FROM MEDICAL APPOINTMENTS, MEETINGS, WORK OR FROM GETTING THINGS NEEDED FOR DAILY LIVING?: NO

## 2023-07-10 SDOH — HEALTH STABILITY: PHYSICAL HEALTH: ON AVERAGE, HOW MANY MINUTES DO YOU ENGAGE IN EXERCISE AT THIS LEVEL?: 30 MIN

## 2023-07-10 SDOH — HEALTH STABILITY: PHYSICAL HEALTH: ON AVERAGE, HOW MANY DAYS PER WEEK DO YOU ENGAGE IN MODERATE TO STRENUOUS EXERCISE (LIKE A BRISK WALK)?: 2 DAYS

## 2023-07-10 SDOH — ECONOMIC STABILITY: FOOD INSECURITY: WITHIN THE PAST 12 MONTHS, THE FOOD YOU BOUGHT JUST DIDN'T LAST AND YOU DIDN'T HAVE MONEY TO GET MORE.: NEVER TRUE

## 2023-07-10 SDOH — HEALTH STABILITY: MENTAL HEALTH
STRESS IS WHEN SOMEONE FEELS TENSE, NERVOUS, ANXIOUS, OR CAN'T SLEEP AT NIGHT BECAUSE THEIR MIND IS TROUBLED. HOW STRESSED ARE YOU?: RATHER MUCH

## 2023-07-10 SDOH — ECONOMIC STABILITY: TRANSPORTATION INSECURITY
IN THE PAST 12 MONTHS, HAS LACK OF TRANSPORTATION KEPT YOU FROM MEETINGS, WORK, OR FROM GETTING THINGS NEEDED FOR DAILY LIVING?: NO

## 2023-07-10 ASSESSMENT — LIFESTYLE VARIABLES
HOW OFTEN DO YOU HAVE A DRINK CONTAINING ALCOHOL: 4 OR MORE TIMES A WEEK
HOW OFTEN DO YOU HAVE SIX OR MORE DRINKS ON ONE OCCASION: NEVER
SKIP TO QUESTIONS 9-10: 0
AUDIT-C TOTAL SCORE: 5
HOW MANY STANDARD DRINKS CONTAINING ALCOHOL DO YOU HAVE ON A TYPICAL DAY: 3 OR 4

## 2023-07-10 ASSESSMENT — SOCIAL DETERMINANTS OF HEALTH (SDOH)
HOW MANY DRINKS CONTAINING ALCOHOL DO YOU HAVE ON A TYPICAL DAY WHEN YOU ARE DRINKING: 3 OR 4
HOW OFTEN DO YOU GET TOGETHER WITH FRIENDS OR RELATIVES?: THREE TIMES A WEEK
HOW OFTEN DO YOU GET TOGETHER WITH FRIENDS OR RELATIVES?: THREE TIMES A WEEK
HOW OFTEN DO YOU ATTENT MEETINGS OF THE CLUB OR ORGANIZATION YOU BELONG TO?: NEVER
WITHIN THE PAST 12 MONTHS, YOU WORRIED THAT YOUR FOOD WOULD RUN OUT BEFORE YOU GOT THE MONEY TO BUY MORE: NEVER TRUE
DO YOU BELONG TO ANY CLUBS OR ORGANIZATIONS SUCH AS CHURCH GROUPS UNIONS, FRATERNAL OR ATHLETIC GROUPS, OR SCHOOL GROUPS?: NO
IN A TYPICAL WEEK, HOW MANY TIMES DO YOU TALK ON THE PHONE WITH FAMILY, FRIENDS, OR NEIGHBORS?: TWICE A WEEK
IN A TYPICAL WEEK, HOW MANY TIMES DO YOU TALK ON THE PHONE WITH FAMILY, FRIENDS, OR NEIGHBORS?: TWICE A WEEK
HOW OFTEN DO YOU ATTEND CHURCH OR RELIGIOUS SERVICES?: NEVER
HOW HARD IS IT FOR YOU TO PAY FOR THE VERY BASICS LIKE FOOD, HOUSING, MEDICAL CARE, AND HEATING?: NOT HARD AT ALL
HOW OFTEN DO YOU HAVE SIX OR MORE DRINKS ON ONE OCCASION: NEVER
DO YOU BELONG TO ANY CLUBS OR ORGANIZATIONS SUCH AS CHURCH GROUPS UNIONS, FRATERNAL OR ATHLETIC GROUPS, OR SCHOOL GROUPS?: NO
HOW OFTEN DO YOU ATTEND CHURCH OR RELIGIOUS SERVICES?: NEVER
HOW OFTEN DO YOU HAVE A DRINK CONTAINING ALCOHOL: 4 OR MORE TIMES A WEEK
HOW OFTEN DO YOU ATTENT MEETINGS OF THE CLUB OR ORGANIZATION YOU BELONG TO?: NEVER

## 2023-07-11 LAB
25(OH)D3 SERPL-MCNC: 75 NG/ML (ref 30–100)
T3FREE SERPL-MCNC: 2.68 PG/ML (ref 2–4.4)
T4 FREE SERPL-MCNC: 0.99 NG/DL (ref 0.93–1.7)
TSH SERPL DL<=0.005 MIU/L-ACNC: 2.36 UIU/ML (ref 0.38–5.33)

## 2023-07-12 NOTE — PROGRESS NOTES
Home enrollment completed in the 3 day Zio XT Holter monitoring program, per Clarence Garcia MD. Monitor will be shipped to patient via [employer]. Pending EOS.

## 2023-07-13 ENCOUNTER — OFFICE VISIT (OUTPATIENT)
Dept: MEDICAL GROUP | Age: 68
End: 2023-07-13
Payer: MEDICARE

## 2023-07-13 VITALS
HEIGHT: 67 IN | SYSTOLIC BLOOD PRESSURE: 136 MMHG | TEMPERATURE: 98.2 F | HEART RATE: 82 BPM | BODY MASS INDEX: 27.47 KG/M2 | OXYGEN SATURATION: 96 % | DIASTOLIC BLOOD PRESSURE: 80 MMHG | WEIGHT: 175 LBS

## 2023-07-13 DIAGNOSIS — I10 PRIMARY HYPERTENSION: ICD-10-CM

## 2023-07-13 DIAGNOSIS — K21.00 GASTROESOPHAGEAL REFLUX DISEASE WITH ESOPHAGITIS WITHOUT HEMORRHAGE: ICD-10-CM

## 2023-07-13 DIAGNOSIS — I49.3 PVC (PREMATURE VENTRICULAR CONTRACTION): ICD-10-CM

## 2023-07-13 DIAGNOSIS — Z00.00 MEDICARE ANNUAL WELLNESS VISIT, INITIAL: ICD-10-CM

## 2023-07-13 PROCEDURE — 3075F SYST BP GE 130 - 139MM HG: CPT | Performed by: FAMILY MEDICINE

## 2023-07-13 PROCEDURE — 3079F DIAST BP 80-89 MM HG: CPT | Performed by: FAMILY MEDICINE

## 2023-07-13 PROCEDURE — G0438 PPPS, INITIAL VISIT: HCPCS | Performed by: FAMILY MEDICINE

## 2023-07-13 ASSESSMENT — FIBROSIS 4 INDEX: FIB4 SCORE: 1.73

## 2023-07-13 ASSESSMENT — ENCOUNTER SYMPTOMS: GENERAL WELL-BEING: GOOD

## 2023-07-13 ASSESSMENT — PATIENT HEALTH QUESTIONNAIRE - PHQ9: CLINICAL INTERPRETATION OF PHQ2 SCORE: 0

## 2023-07-13 ASSESSMENT — ACTIVITIES OF DAILY LIVING (ADL): BATHING_REQUIRES_ASSISTANCE: 0

## 2023-07-13 NOTE — PROGRESS NOTES
Chief Complaint   Patient presents with    Annual Exam       HPI:  Keisha is a 68 y.o. here for Medicare Annual Wellness Visit        Patient Active Problem List    Diagnosis Date Noted    PVC (premature ventricular contraction) 03/31/2021    Palpitations 03/10/2021    Dupuytren's disease 04/03/2019    Tinnitus of both ears 04/03/2019    Hypercalcemia 09/05/2018    Vitamin D deficiency 09/05/2018    Screening for colon cancer 03/02/2018    Annual physical exam 03/02/2018    Decreased libido 03/02/2018    Need for vaccination 03/02/2018    Inflamed seborrheic keratosis 03/02/2018    AK (actinic keratosis) 03/02/2018    GERD (gastroesophageal reflux disease) 10/22/2015    Mitral valve disorder 09/08/2014    Epigastric pain 03/18/2014    HTN (hypertension) 03/17/2014    Hypokalemia 03/16/2014    Lower urinary tract infectious disease 03/16/2014       Current Outpatient Medications   Medication Sig Dispense Refill    metoprolol SR (TOPROL XL) 25 MG TABLET SR 24 HR Take 1 Tablet by mouth every day. (Patient taking differently: Take 50 mg by mouth every day. Changed to 50 mg -cardiologist recommended.) 100 Tablet 2    losartan (COZAAR) 100 MG Tab Take 1 Tablet by mouth every day. 100 Tablet 2    estradiol (VIVELLE) 0.0375 MG/24HR patch Place 1 Patch on the skin.      Ascorbic Acid (VITAMIN C CR) 1500 MG Tab CR Take  by mouth.      vitamin D (CHOLECALCIFEROL) 1000 UNIT Tab Take 1,000 Units by mouth every day.      omeprazole (PRILOSEC) 20 MG delayed-release capsule Take 1 Cap by mouth 2 times a day. Indications: **OTC**      multivitamin (THERAGRAN) TABS Take 1 Tab by mouth every day. Indications: **OTC**      docosahexanoic acid (OMEGA 3 FA) 1000 MG CAPS Take 1,000 mg by mouth 2 Times a Day. Indications: **OTC**      predniSONE (DELTASONE) 20 MG Tab Take 3 tabs po for 2 days then 2 tabs for 2 days then 1 for 2 days 12 Tablet 0     No current facility-administered medications for this visit.        Patient is taking  medications as noted in medication list.  Current supplements as per medication list.     Allergies: Erythromycin and Inapsine [droperidol]    Current social contact/activities: working for [employer], travel, spend time with family.      Is patient current with immunizations? Yes.    She  reports that she has never smoked. She has never used smokeless tobacco. She reports current alcohol use of about 8.4 oz of alcohol per week. She reports that she does not use drugs.  Counseling given: Not Answered      ROS:    Gait: Uses no assistive device   Ostomy: No   Other tubes: No   Amputations: No   Chronic oxygen use No   Last eye exam: 2020  Wears hearing aids: No   : Denies any urinary leakage during the last 6 months    Screening:    Depression Screening  Little interest or pleasure in doing things?  0 - not at all  Feeling down, depressed, or hopeless? 0 - not at all  Patient Health Questionnaire Score: 0    If depressive symptoms identified deferred to follow up visit unless specifically addressed in assessment and plan.    Interpretation of PHQ-9 Total Score   Score Severity   1-4 No Depression   5-9 Mild Depression   10-14 Moderate Depression   15-19 Moderately Severe Depression   20-27 Severe Depression    Screening for Cognitive Impairment  Three Minute Recall (daughter, heaven, mountain)  3/3    Александр clock face with all 12 numbers and set the hands to show 10 past 11.  Yes    If cognitive concerns identified, deferred for follow up unless specifically addressed in assessment and plan.    Fall Risk Assessment  Has the patient had two or more falls in the last year or any fall with injury in the last year?  No  If fall risk identified, deferred for follow up unless specifically addressed in assessment and plan.    Safety Assessment  Throw rugs on floor.  No  Handrails on all stairs.  No  Good lighting in all hallways.  Yes  Difficulty hearing.  No  Patient counseled about all safety risks that were  identified.    Functional Assessment ADLs  Are there any barriers preventing you from cooking for yourself or meeting nutritional needs?  No.    Are there any barriers preventing you from driving safely or obtaining transportation?  No.    Are there any barriers preventing you from using a telephone or calling for help?  No.    Are there any barriers preventing you from shopping?  No.    Are there any barriers preventing you from taking care of your own finances?  No.    Are there any barriers preventing you from managing your medications?  No.    Are there any barriers preventing you from showering, bathing or dressing yourself?  No.    Are you currently engaging in any exercise or physical activity?  Yes.     What is your perception of your health?  Good.    Advance Care Planning  Do you have an Advance Directive, Living Will, Durable Power of , or POLST? Yes  Advance Directive Living Will Durable Power of  POLST is on file    Health Maintenance Summary            Overdue - IMM PNEUMOCOCCAL VACCINE: 65+ Years (2 - PCV) Overdue since 3/2/2022      03/02/2021  Imm Admin: Pneumococcal polysaccharide vaccine (PPSV-23)    11/24/2014  Imm Admin: Pneumococcal polysaccharide vaccine (PPSV-23)    10/01/2014  Imm Admin: Pneumococcal Vaccine (PCV7) - HISTORICAL DATA              Overdue - COVID-19 Vaccine (2 - Pfizer series) Overdue since 3/18/2023      11/18/2022  Imm Admin: MODERNA BIVALENT BOOSTER SARS-COV-2 VACCINE (6+)    02/26/2022  Imm Admin: MODERNA SARS-COV-2 VACCINE (12+)    08/31/2021  Imm Admin: MODERNA SARS-COV-2 VACCINE (12+)    01/20/2021  Imm Admin: MODERNA SARS-COV-2 VACCINE (12+)    12/22/2020  Imm Admin: MODERNA SARS-COV-2 VACCINE (12+)              IMM INFLUENZA (1) Next due on 9/1/2023 09/12/2022  Imm Admin: Influenza, Unspecified - HISTORICAL DATA    08/28/2021  Imm Admin: Influenza Vaccine Adult HD    08/29/2020  Imm Admin: Influenza, Unspecified - HISTORICAL DATA    10/02/2019   Imm Admin: Influenza Vaccine Quad Inj (Pf)    10/02/2019  Imm Admin: Influenza, Unspecified - HISTORICAL DATA    Only the first 5 history entries have been loaded, but more history exists.              COLORECTAL CANCER SCREENING (COLONOSCOPY - Every 5 Years) Next due on 6/13/2024 06/13/2019  REFERRAL TO GI FOR COLONOSCOPY    05/25/2018  REFERRAL TO GI FOR COLONOSCOPY              Annual Wellness Visit (Every 366 Days) Next due on 7/13/2024 07/13/2023  Visit Dx: Medicare annual wellness visit, initial    06/07/2022  Visit Dx: Medicare annual wellness visit, initial    02/16/2021  Visit Dx: Medicare annual wellness visit, initial              MAMMOGRAM (Every 2 Years) Next due on 7/10/2025      07/10/2023  MA-SCREENING MAMMO BILAT W/TOMOSYNTHESIS W/CAD    07/08/2022  MA-SCREENING MAMMO BILAT W/TOMOSYNTHESIS W/CAD    07/06/2021  MA-SCREENING MAMMO BILAT W/TOMOSYNTHESIS W/CAD    06/25/2020  MA-SCREENING MAMMO BILAT W/TOMOSYNTHESIS W/CAD    01/31/2019  MA-SCREENING MAMMO BILAT W/TOMOSYNTHESIS W/CAD    Only the first 5 history entries have been loaded, but more history exists.              BONE DENSITY (Every 5 Years) Next due on 3/30/2026      03/30/2021  DS-BONE DENSITY STUDY (DEXA)              IMM DTaP/Tdap/Td Vaccine (2 - Td or Tdap) Next due on 3/2/2028      03/02/2018  Imm Admin: Tdap Vaccine              HEPATITIS C SCREENING  Completed      03/18/2014  Hepatitis C Antibody component of HEPATITIS PANEL ACUTE(4 COMPONENTS)    01/23/2007  Hepatitis C Antibody component of HEP C VIRUS ANTIBODY    03/15/2006  Hepatitis C Antibody component of HEP C VIRUS ANTIBODY              IMM ZOSTER VACCINES (Series Information) Completed      09/08/2018  Imm Admin: Zoster Vaccine Recombinant (RZV) (SHINGRIX)    05/22/2018  Imm Admin: Zoster Vaccine Recombinant (RZV) (SHINGRIX)              IMM HEP B VACCINE (Series Information) Aged Out      No completion history exists for this topic.              HPV Vaccines (Series  "Information) Aged Out      No completion history exists for this topic.              IMM MENINGOCOCCAL ACWY VACCINE (Series Information) Aged Out      No completion history exists for this topic.                    Patient Care Team:  Cornelius Andrew M.D. as PCP - General (Family Medicine)  Cornelius Andrew M.D. as PCP - [facility] Paneled  Jacqueline Arias M.D. (Inactive) as Consulting Physician (OB & Gyn - Gynecology)  Clarence Garcia M.D. as Consulting Physician (Cardiovascular Disease (Cardiology))    Social History     Tobacco Use    Smoking status: Never    Smokeless tobacco: Never   Vaping Use    Vaping Use: Never used   Substance Use Topics    Alcohol use: Yes     Alcohol/week: 8.4 oz     Types: 14 Glasses of wine per week     Comment: 1-2 glasses every day    Drug use: Never     Family History   Problem Relation Age of Onset    Cancer Paternal Grandmother      She  has a past medical history of Hypertension.    She has no past medical history of Breast cancer (HCC).   Past Surgical History:   Procedure Laterality Date    GYN SURGERY      hysterectomy    OTHER      tonsillectomy    OTHER ABDOMINAL SURGERY      appy       Exam:   /80 (BP Location: Left arm, Patient Position: Sitting, BP Cuff Size: Large adult)   Pulse 82   Temp 36.8 °C (98.2 °F) (Temporal)   Ht 1.702 m (5' 7\")   Wt 79.4 kg (175 lb)   SpO2 96%  Body mass index is 27.41 kg/m².    Hearing excellent.    Dentition good  Alert, oriented in no acute distress.  Eye contact is good, speech goal directed, affect calm  yes    Assessment and Plan. The following treatment and monitoring plan is recommended:    1. Medicare annual wellness visit, initial    2. PVC (premature ventricular contraction)    3. Primary hypertension    4. Gastroesophageal reflux disease with esophagitis without hemorrhage     Plan    Completed    2.  Recently have increased her cardiologist did increase the metoprolol to 50 mg p.o. daily.   Zio patch is pending.    3. Patient " has been stable with current management  We will make no changes for now    5. Patient has been stable with current management  We will make no changes for now    Services suggested: No services needed at this time  Health Care Screening recommendations as per orders if indicated.  Referrals offered: PT/OT/Nutrition counseling/Behavioral Health/Smoking cessation as per orders if indicated.    Discussion today about general wellness and lifestyle habits:    Prevent falls and reduce trip hazards; Cautioned about securing or removing rugs.  Have a working fire alarm and carbon monoxide detector;   Engage in regular physical activity and social activities     Follow-up: Return in about 3 months (around 10/13/2023) for Reevaluation, labs.

## 2023-07-14 ENCOUNTER — NON-PROVIDER VISIT (OUTPATIENT)
Dept: CARDIOLOGY | Facility: MEDICAL CENTER | Age: 68
End: 2023-07-14
Attending: INTERNAL MEDICINE
Payer: MEDICARE

## 2023-07-14 DIAGNOSIS — I49.3 PVC'S (PREMATURE VENTRICULAR CONTRACTIONS): ICD-10-CM

## 2023-07-14 DIAGNOSIS — R00.2 PALPITATIONS: ICD-10-CM

## 2023-07-14 DIAGNOSIS — I49.3 SYMPTOMATIC PVCS: ICD-10-CM

## 2023-07-14 DIAGNOSIS — I49.1 APC (ATRIAL PREMATURE CONTRACTIONS): ICD-10-CM

## 2023-07-24 ENCOUNTER — TELEPHONE (OUTPATIENT)
Dept: CARDIOLOGY | Facility: MEDICAL CENTER | Age: 68
End: 2023-07-24
Payer: MEDICARE

## 2023-07-25 PROCEDURE — 93248 EXT ECG>7D<15D REV&INTERPJ: CPT | Performed by: INTERNAL MEDICINE

## 2023-08-10 ENCOUNTER — OFFICE VISIT (OUTPATIENT)
Dept: CARDIOLOGY | Facility: MEDICAL CENTER | Age: 68
End: 2023-08-10
Attending: INTERNAL MEDICINE
Payer: MEDICARE

## 2023-08-10 ENCOUNTER — PATIENT MESSAGE (OUTPATIENT)
Dept: CARDIOLOGY | Facility: MEDICAL CENTER | Age: 68
End: 2023-08-10

## 2023-08-10 VITALS
BODY MASS INDEX: 26.68 KG/M2 | WEIGHT: 170 LBS | SYSTOLIC BLOOD PRESSURE: 118 MMHG | HEIGHT: 67 IN | RESPIRATION RATE: 16 BRPM | DIASTOLIC BLOOD PRESSURE: 78 MMHG | OXYGEN SATURATION: 96 % | HEART RATE: 85 BPM

## 2023-08-10 DIAGNOSIS — I10 PRIMARY HYPERTENSION: ICD-10-CM

## 2023-08-10 DIAGNOSIS — I49.3 SYMPTOMATIC PVCS: ICD-10-CM

## 2023-08-10 DIAGNOSIS — R00.2 PALPITATIONS: ICD-10-CM

## 2023-08-10 PROCEDURE — 99214 OFFICE O/P EST MOD 30 MIN: CPT | Performed by: INTERNAL MEDICINE

## 2023-08-10 PROCEDURE — 3074F SYST BP LT 130 MM HG: CPT | Performed by: INTERNAL MEDICINE

## 2023-08-10 PROCEDURE — 3078F DIAST BP <80 MM HG: CPT | Performed by: INTERNAL MEDICINE

## 2023-08-10 PROCEDURE — 99212 OFFICE O/P EST SF 10 MIN: CPT | Performed by: INTERNAL MEDICINE

## 2023-08-10 RX ORDER — METOPROLOL SUCCINATE 25 MG/1
25 TABLET, EXTENDED RELEASE ORAL 2 TIMES DAILY
Qty: 200 TABLET | Refills: 3 | Status: SHIPPED | OUTPATIENT
Start: 2023-08-10 | End: 2024-03-14 | Stop reason: SDUPTHER

## 2023-08-10 ASSESSMENT — FIBROSIS 4 INDEX: FIB4 SCORE: 1.73

## 2023-08-10 NOTE — PROGRESS NOTES
Cardiology Follow-up Consultation Note    Date of note:    8/10/2023  Primary Care Provider: Cornelius Andrew M.D.    Name:             Keisha Lyle   YOB: 1955  MRN:               [id number]    Chief Complaint   Patient presents with    Hypertension    Other     F/V Dx:Symptomatic PVCs       HISTORY OF PRESENT ILLNESS  Ms. Keisha Lyle is a 66 y.o. female who returns to see us for follow-up of HTN and symptomatic PVCs.    Last clinic visit: 8/20/2021    Interim History:  Since her last visit, was initially doing well.  However, earlier this year started having frequent, symptomatic PVCs.  Doubled her metoprolol to 50 mg daily and has noted a significant improvement in symptoms.    Exercises on a regular basis without concerning symptoms.      Past Medical History:   Diagnosis Date    Hypertension     medicated         Past Surgical History:   Procedure Laterality Date    GYN SURGERY      hysterectomy    OTHER      tonsillectomy    OTHER ABDOMINAL SURGERY      appy         Current Outpatient Medications   Medication Sig Dispense Refill    metoprolol SR (TOPROL XL) 25 MG TABLET SR 24 HR Take 1 Tablet by mouth 2 times a day. 200 Tablet 3    losartan (COZAAR) 100 MG Tab Take 1 Tablet by mouth every day. 100 Tablet 2    estradiol (VIVELLE) 0.0375 MG/24HR patch Place 1 Patch on the skin.      Ascorbic Acid (VITAMIN C CR) 1500 MG Tab CR Take  by mouth.      vitamin D (CHOLECALCIFEROL) 1000 UNIT Tab Take 1,000 Units by mouth every day.      omeprazole (PRILOSEC) 20 MG delayed-release capsule Take 1 Cap by mouth 2 times a day. Indications: **OTC**      multivitamin (THERAGRAN) TABS Take 1 Tab by mouth every day. Indications: **OTC**      docosahexanoic acid (OMEGA 3 FA) 1000 MG CAPS Take 1,000 mg by mouth 2 Times a Day. Indications: **OTC**       No current facility-administered medications for this visit.         Allergies   Allergen Reactions    Erythromycin Hives and Vomiting     Rxn =  unknown - years ago     Inapsine [Droperidol]      agitated         Family History   Problem Relation Age of Onset    Cancer Paternal Grandmother          Social History     Socioeconomic History    Marital status:      Spouse name: Not on file    Number of children: Not on file    Years of education: Not on file    Highest education level: Bachelor's degree (e.g., BA, AB, BS)   Occupational History    Not on file   Tobacco Use    Smoking status: Never    Smokeless tobacco: Never   Vaping Use    Vaping Use: Never used   Substance and Sexual Activity    Alcohol use: Yes     Alcohol/week: 8.4 oz     Types: 14 Glasses of wine per week     Comment: 1-2 glasses every day    Drug use: Never    Sexual activity: Not on file   Other Topics Concern    Not on file   Social History Narrative    Not on file     Social Determinants of Health     Financial Resource Strain: Low Risk  (7/10/2023)    Overall Financial Resource Strain (CARDIA)     Difficulty of Paying Living Expenses: Not hard at all   Food Insecurity: No Food Insecurity (7/10/2023)    Hunger Vital Sign     Worried About Running Out of Food in the Last Year: Never true     Ran Out of Food in the Last Year: Never true   Transportation Needs: No Transportation Needs (7/10/2023)    PRAPARE - Transportation     Lack of Transportation (Medical): No     Lack of Transportation (Non-Medical): No   Physical Activity: Insufficiently Active (7/10/2023)    Exercise Vital Sign     Days of Exercise per Week: 2 days     Minutes of Exercise per Session: 30 min   Stress: Stress Concern Present (7/10/2023)    [place] [place] [employer] - Occupational Stress Questionnaire     Feeling of Stress : Rather much   Social Connections: Moderately Isolated (7/10/2023)    Social Connection and Isolation Panel [NHANES]     Frequency of Communication with Friends and Family: Twice a week     Frequency of Social Gatherings with Friends and Family: Three times a week     Attends  "Mu-ism Services: Never     Active Member of Clubs or Organizations: No     Attends Club or Organization Meetings: Never     Marital Status:    Intimate Partner Violence: Not on file   Housing Stability: Unknown (7/10/2023)    Housing Stability Vital Sign     Unable to Pay for Housing in the Last Year: Not on file     Number of Places Lived in the Last Year: 1     Unstable Housing in the Last Year: No       Physical Exam:  Ambulatory Vitals  /78 (BP Location: Left arm, Patient Position: Sitting, BP Cuff Size: Adult)   Pulse 85   Resp 16   Ht 1.702 m (5' 7\")   Wt 77.1 kg (170 lb)   SpO2 96%    Oxygen Therapy:  Pulse Oximetry: 96 %  BP Readings from Last 4 Encounters:   08/10/23 118/78   07/13/23 136/80   12/07/22 118/74   07/08/22 120/82       Weight/BMI: Body mass index is 26.63 kg/m².  Wt Readings from Last 4 Encounters:   08/10/23 77.1 kg (170 lb)   07/13/23 79.4 kg (175 lb)   12/07/22 79.1 kg (174 lb 6.4 oz)   07/08/22 73.5 kg (162 lb)       GEN: Well developed, well nourished and in no acute distress.  HEART: no significant JVD, regular rate and rhythm, normal S1 and S2, grade I/VI systolic murmurs, no third heart sounds, normal cardiac palpation  LUNG: clear to auscultation bilaterally, no wheezing, no crackles, normal respiratory effort on room air  ABDOMEN: soft, non-tender, non-distended, normal bowel sounds throughout  EXTREMITIES: no peripheral edema noted  VASCULAR: no significantly elevated jugular venous pressure, no carotid bruits noted, radial pulses 2+ and equal      Lab Data Review:  Lab Results   Component Value Date/Time    CHOLSTRLTOT 197 07/10/2023 08:36 AM    LDL 86 07/10/2023 08:36 AM     07/10/2023 08:36 AM    TRIGLYCERIDE 52 07/10/2023 08:36 AM       Lab Results   Component Value Date/Time    SODIUM 140 07/10/2023 08:36 AM    POTASSIUM 4.2 07/10/2023 08:36 AM    CHLORIDE 103 07/10/2023 08:36 AM    CO2 23 07/10/2023 08:36 AM    GLUCOSE 93 07/10/2023 08:36 AM    BUN " 13 07/10/2023 08:36 AM    CREATININE 0.74 07/10/2023 08:36 AM    CREATININE 0.9 01/29/2007 06:35 AM     Lab Results   Component Value Date/Time    ALKPHOSPHAT 66 07/10/2023 08:36 AM    ASTSGOT 45 07/10/2023 08:36 AM    ALTSGPT 42 07/10/2023 08:36 AM    TBILIRUBIN 0.4 07/10/2023 08:36 AM      Lab Results   Component Value Date/Time    WBC 6.3 07/10/2023 08:36 AM     Lab Results   Component Value Date/Time    HBA1C 5.1 08/06/2013 08:15 AM       Cardiac Imaging and Procedures Review:    EKG dated 8/20/2021: My personal interpretation is sinus rhythm    Echo dated 4/8/2021:   CONCLUSIONS   Normal left ventricular size and function with EF 65%, wall thickness, and systolic function.     Cardiac Event Monitor 7/24/2023:  1. Sinus rhythm with heart rate range from 52 to 139 bpm. Average heart rate 80 bpm.   2. Normal sinus node and AV node conduction normal. No pauses.   3. Rare PACs.   4. Rare PVCs.   5. No sustained rhythm changes. No atrial fibrillation/flutter.   6. No patient triggers.     Conclusion: Normal event monitor results       Radiology test Review:  CT Cardiac Scoring (6/12/2019):    FINDINGS:   Coronary calcification:  LMA - 0.0  LCX - 0.0  LAD - 0.0  RCA - 0.0  PDA - 0.0    Total Calcium Score: 0.0      Assessment & Plan     1. Primary hypertension        2. Palpitations  metoprolol SR (TOPROL XL) 25 MG TABLET SR 24 HR      3. Symptomatic PVCs  metoprolol SR (TOPROL XL) 25 MG TABLET SR 24 HR          Patient with symptomatic PVCs and palpitations, doing better on increased dose of metoprolol.  Continue metoprolol XL 25 mg twice daily.  We discussed trial of decreasing it to 25 mg daily and see if palpitations return.    Blood pressure well-controlled.  Continue losartan 100 mg daily.    For dyslipidemia, coronary calcium score was 0 in 2019.  No indication to initiate statins.      All of patient's excellent questions were answered to the best of my knowledge and to her satisfaction.  It was a pleasure  seeing Ms. Keisha Lyle in my clinic today. Return in about 1 year (around 8/10/2024). Patient is aware to call the cardiology clinic with any questions or concerns.      Clarence Garcia MD  [facility], [facility]  [address] [address]  Phone: [phone]  Fax: [phone]

## 2023-08-11 DIAGNOSIS — R00.2 PALPITATIONS: ICD-10-CM

## 2023-08-11 DIAGNOSIS — I49.3 SYMPTOMATIC PVCS: ICD-10-CM

## 2023-08-11 RX ORDER — METOPROLOL SUCCINATE 25 MG/1
25 TABLET, EXTENDED RELEASE ORAL DAILY
Qty: 20 TABLET | Refills: 0 | Status: SHIPPED | OUTPATIENT
Start: 2023-08-11 | End: 2023-08-11 | Stop reason: SDUPTHER

## 2023-08-11 RX ORDER — METOPROLOL SUCCINATE 25 MG/1
25 TABLET, EXTENDED RELEASE ORAL
Qty: 20 TABLET | Refills: 0 | Status: SHIPPED | OUTPATIENT
Start: 2023-08-11 | End: 2024-03-14

## 2023-12-09 ENCOUNTER — PATIENT MESSAGE (OUTPATIENT)
Dept: MEDICAL GROUP | Age: 68
End: 2023-12-09
Payer: MEDICARE

## 2023-12-11 DIAGNOSIS — I10 PRIMARY HYPERTENSION: ICD-10-CM

## 2023-12-12 RX ORDER — LOSARTAN POTASSIUM 100 MG/1
100 TABLET ORAL DAILY
Qty: 100 TABLET | Refills: 2 | Status: SHIPPED | OUTPATIENT
Start: 2023-12-12 | End: 2024-03-13 | Stop reason: SDUPTHER

## 2023-12-13 RX ORDER — ONDANSETRON 4 MG/1
4 TABLET, FILM COATED ORAL EVERY 4 HOURS PRN
Qty: 30 TABLET | Refills: 2 | Status: SHIPPED | OUTPATIENT
Start: 2023-12-13

## 2024-03-13 DIAGNOSIS — R00.2 PALPITATIONS: ICD-10-CM

## 2024-03-13 DIAGNOSIS — I49.3 SYMPTOMATIC PVCS: ICD-10-CM

## 2024-03-13 DIAGNOSIS — I10 PRIMARY HYPERTENSION: ICD-10-CM

## 2024-03-13 RX ORDER — METOPROLOL SUCCINATE 25 MG/1
25 TABLET, EXTENDED RELEASE ORAL
Qty: 20 TABLET | Refills: 0 | Status: CANCELLED | OUTPATIENT
Start: 2024-03-13

## 2024-03-14 PROCEDURE — RXMED WILLOW AMBULATORY MEDICATION CHARGE: Performed by: INTERNAL MEDICINE

## 2024-03-14 PROCEDURE — RXMED WILLOW AMBULATORY MEDICATION CHARGE: Performed by: FAMILY MEDICINE

## 2024-03-14 RX ORDER — METOPROLOL SUCCINATE 25 MG/1
25 TABLET, EXTENDED RELEASE ORAL 2 TIMES DAILY
Qty: 200 TABLET | Refills: 0 | Status: SHIPPED | OUTPATIENT
Start: 2024-03-14 | End: 2024-03-25 | Stop reason: SDUPTHER

## 2024-03-14 RX ORDER — LOSARTAN POTASSIUM 100 MG/1
100 TABLET ORAL DAILY
Qty: 100 TABLET | Refills: 2 | Status: SHIPPED | OUTPATIENT
Start: 2024-03-14 | End: 2024-03-25 | Stop reason: SDUPTHER

## 2024-03-14 NOTE — TELEPHONE ENCOUNTER
Pharmacy updated.     Courtesy refill under TW as ADD.    To schedulers, please reach out to pt to schedule [facility] to establish new provider. Thank you!

## 2024-03-14 NOTE — TELEPHONE ENCOUNTER
** PLEASE ADVISE, PATIENT LAST SEEN [facility], NEEDS TO BE SENT TO [facility]. THANK YOU*    Is the patient due for a refill? No    Was the patient seen the past year? Yes    Date of last office visit: 08/10/2023    Does the patient have an upcoming appointment?  No   If yes, When?     Provider to refill:[facility]    Does the patients insurance require a 100 day supply?  Yes

## 2024-03-15 ENCOUNTER — PHARMACY VISIT (OUTPATIENT)
Dept: PHARMACY | Facility: MEDICAL CENTER | Age: 69
End: 2024-03-15
Payer: COMMERCIAL

## 2024-03-25 ENCOUNTER — OFFICE VISIT (OUTPATIENT)
Dept: CARDIOLOGY | Facility: MEDICAL CENTER | Age: 69
End: 2024-03-25
Attending: NURSE PRACTITIONER
Payer: MEDICARE

## 2024-03-25 VITALS
SYSTOLIC BLOOD PRESSURE: 120 MMHG | DIASTOLIC BLOOD PRESSURE: 88 MMHG | HEART RATE: 80 BPM | WEIGHT: 159 LBS | HEIGHT: 67 IN | OXYGEN SATURATION: 94 % | BODY MASS INDEX: 24.96 KG/M2

## 2024-03-25 DIAGNOSIS — R00.2 PALPITATIONS: ICD-10-CM

## 2024-03-25 DIAGNOSIS — I10 PRIMARY HYPERTENSION: ICD-10-CM

## 2024-03-25 DIAGNOSIS — I49.3 SYMPTOMATIC PVCS: ICD-10-CM

## 2024-03-25 LAB — EKG IMPRESSION: NORMAL

## 2024-03-25 PROCEDURE — 99212 OFFICE O/P EST SF 10 MIN: CPT | Performed by: NURSE PRACTITIONER

## 2024-03-25 PROCEDURE — 93005 ELECTROCARDIOGRAM TRACING: CPT | Performed by: NURSE PRACTITIONER

## 2024-03-25 PROCEDURE — 93010 ELECTROCARDIOGRAM REPORT: CPT | Performed by: INTERNAL MEDICINE

## 2024-03-25 RX ORDER — LOSARTAN POTASSIUM 100 MG/1
100 TABLET ORAL DAILY
Qty: 100 TABLET | Refills: 2 | Status: SHIPPED | OUTPATIENT
Start: 2024-03-25

## 2024-03-25 RX ORDER — METOPROLOL SUCCINATE 25 MG/1
25 TABLET, EXTENDED RELEASE ORAL 2 TIMES DAILY
Qty: 200 TABLET | Refills: 3 | Status: SHIPPED | OUTPATIENT
Start: 2024-03-25

## 2024-03-25 ASSESSMENT — ENCOUNTER SYMPTOMS
HALLUCINATIONS: 0
SHORTNESS OF BREATH: 0
MUSCULOSKELETAL NEGATIVE: 1
DIZZINESS: 0
PND: 0
NAUSEA: 0
NEUROLOGICAL NEGATIVE: 1
PALPITATIONS: 0
EYES NEGATIVE: 1
CLAUDICATION: 0
WHEEZING: 0
RESPIRATORY NEGATIVE: 1
DEPRESSION: 0
CONSTITUTIONAL NEGATIVE: 1
ORTHOPNEA: 0
SENSORY CHANGE: 0
NERVOUS/ANXIOUS: 0
GASTROINTESTINAL NEGATIVE: 1
BRUISES/BLEEDS EASILY: 0

## 2024-03-25 ASSESSMENT — FIBROSIS 4 INDEX: FIB4 SCORE: 1.76

## 2024-03-25 NOTE — PROGRESS NOTES
Cardiology Follow up Note    DOS: 3/25/2024  [id number]  Keisha ROXI Lyle    Chief complaint/Reason for consult: annual follow up    HPI: Pt is a 69 y.o. female who presents to the clinic today in follow up for longitudinal cardiac care. Patient has a past medical history significant for but not limited to: hypertension, mitral valve disorder, GERD, PVC. Patient doing well no new complaints.       Past Medical History:   Diagnosis Date    Hypertension     medicated       Past Surgical History:   Procedure Laterality Date    GYN SURGERY      hysterectomy    OTHER      tonsillectomy    OTHER ABDOMINAL SURGERY      appy       Social History     Socioeconomic History    Marital status:      Spouse name: Not on file    Number of children: Not on file    Years of education: Not on file    Highest education level: Bachelor's degree (e.g., BA, AB, BS)   Occupational History    Not on file   Tobacco Use    Smoking status: Never    Smokeless tobacco: Never   Vaping Use    Vaping Use: Never used   Substance and Sexual Activity    Alcohol use: Yes     Alcohol/week: 8.4 oz     Types: 14 Glasses of wine per week     Comment: 1-2 glasses every day    Drug use: Never    Sexual activity: Not on file   Other Topics Concern    Not on file   Social History Narrative    Not on file     Social Determinants of Health     Financial Resource Strain: Low Risk  (7/10/2023)    Overall Financial Resource Strain (CARDIA)     Difficulty of Paying Living Expenses: Not hard at all   Food Insecurity: No Food Insecurity (7/10/2023)    Hunger Vital Sign     Worried About Running Out of Food in the Last Year: Never true     Ran Out of Food in the Last Year: Never true   Transportation Needs: No Transportation Needs (7/10/2023)    PRAPARE - Transportation     Lack of Transportation (Medical): No     Lack of Transportation (Non-Medical): No   Physical Activity: Insufficiently Active (7/10/2023)    Exercise Vital Sign     Days of Exercise per Week: 2  days     Minutes of Exercise per Session: 30 min   Stress: Stress Concern Present (7/10/2023)    [employer] [place] [employer]ational [employer] - Occupational Stress Questionnaire     Feeling of Stress : Rather much   Social Connections: Moderately Isolated (7/10/2023)    Social Connection and Isolation Panel [NHANES]     Frequency of Communication with Friends and Family: Twice a week     Frequency of Social Gatherings with Friends and Family: Three times a week     Attends [employer] Services: Never     Active Member of Clubs or Organizations: No     Attends Club or Organization Meetings: Never     Marital Status:    Intimate Partner Violence: Not on file   Housing Stability: Unknown (7/10/2023)    Housing Stability Vital Sign     Unable to Pay for Housing in the Last Year: Not on file     Number of Places Lived in the Last Year: 1     Unstable Housing in the Last Year: No       Family History   Problem Relation Age of Onset    Cancer Paternal Grandmother        Allergies   Allergen Reactions    Erythromycin Hives and Vomiting     Rxn = unknown - years ago     Inapsine [Droperidol]      agitated       Current Outpatient Medications   Medication Sig Dispense Refill    metoprolol SR (TOPROL XL) 25 MG TABLET SR 24 HR Take 1 Tablet by mouth 2 times a day. 200 Tablet 3    losartan (COZAAR) 100 MG Tab TAKE 1 TABLET BY MOUTH EVERY  Tablet 2    Estradiol 0.025 MG/24HR PATCH BIWEEKLY APPLY ONE PATCH TO SKIN SEMIWEEKLY 24 Patch 3    ondansetron (ZOFRAN) 4 MG Tab tablet Take 1 Tablet by mouth every four hours as needed for Nausea/Vomiting. 30 Tablet 2    Ascorbic Acid (VITAMIN C CR) 1500 MG Tab CR Take  by mouth.      vitamin D (CHOLECALCIFEROL) 1000 UNIT Tab Take 1,000 Units by mouth every day.      omeprazole (PRILOSEC) 20 MG delayed-release capsule Take 1 Cap by mouth 2 times a day. Indications: **OTC**      multivitamin (THERAGRAN) TABS Take 1 Tab by mouth every day. Indications: **OTC**      docosahexanoic acid  (OMEGA 3 FA) 1000 MG CAPS Take 1,000 mg by mouth 2 Times a Day. Indications: **OTC**      estradiol (VIVELLE) 0.0375 MG/24HR patch Place 1 Patch on the skin.       No current facility-administered medications for this visit.       Vitals:    03/25/24 1235   BP: 120/88   Pulse: 80   SpO2: 94%         Review of Systems   Constitutional: Negative.  Negative for malaise/fatigue.   HENT: Negative.     Eyes: Negative.    Respiratory: Negative.  Negative for shortness of breath and wheezing.    Cardiovascular:  Negative for chest pain, palpitations, orthopnea, claudication, leg swelling and PND.   Gastrointestinal: Negative.  Negative for nausea.   Genitourinary: Negative.    Musculoskeletal: Negative.    Skin: Negative.    Neurological: Negative.  Negative for dizziness and sensory change.   Endo/Heme/Allergies: Negative.  Does not bruise/bleed easily.   Psychiatric/Behavioral:  Negative for depression and hallucinations. The patient is not nervous/anxious.           EKG interpreted by me: Sinus    Physical Exam  Constitutional:       Appearance: Normal appearance.   HENT:      Head: Normocephalic.   Eyes:      Pupils: Pupils are equal, round, and reactive to light.   Neck:      Vascular: No JVD.   Cardiovascular:      Rate and Rhythm: Normal rate and regular rhythm.      Pulses: Normal pulses.      Heart sounds: Normal heart sounds.   Pulmonary:      Effort: Pulmonary effort is normal.      Breath sounds: Normal breath sounds.   Abdominal:      General: Abdomen is flat.      Palpations: Abdomen is soft.   Musculoskeletal:      Cervical back: Normal range of motion.      Right lower leg: No edema.      Left lower leg: No edema.   Skin:     General: Skin is warm and dry.   Neurological:      Mental Status: She is alert and oriented to person, place, and time.   Psychiatric:         Mood and Affect: Mood normal.         Behavior: Behavior normal.          Data:  Lipids:   Lab Results   Component Value Date/Time     "CHOLSTRLTOT 197 07/10/2023 08:36 AM    TRIGLYCERIDE 52 07/10/2023 08:36 AM     07/10/2023 08:36 AM    LDL 86 07/10/2023 08:36 AM        BMP:  Lab Results   Component Value Date/Time    SODIUM 140 07/10/2023 0836    POTASSIUM 4.2 07/10/2023 0836    CHLORIDE 103 07/10/2023 0836    CO2 23 07/10/2023 0836    GLUCOSE 93 07/10/2023 0836    BUN 13 07/10/2023 0836    CREATININE 0.74 07/10/2023 0836    CALCIUM 9.9 07/10/2023 0836    ANION 14.0 07/10/2023 0836       GFR:  Lab Results   Component Value Date/Time    IFAFR[place] >60 02/09/2021 1054    IFNOTAFR 58 (A) 02/09/2021 1054        TSH:   Lab Results   Component Value Date/Time    TSHULTRASEN 2.360 07/10/2023 0836       MAGNESIUM:  Lab Results   Component Value Date/Time    MAGNESIUM 2.0 01/14/2016 0656        THYROXINE (T4):   No results found for: \"FREEDIR\"     CBC:   Lab Results   Component Value Date/Time    WBC 6.3 07/10/2023 08:36 AM    RBC 4.66 07/10/2023 08:36 AM    HEMOGLOBIN 15.0 07/10/2023 08:36 AM    HEMATOCRIT 44.4 07/10/2023 08:36 AM    MCV 95.3 07/10/2023 08:36 AM    MCH 32.2 07/10/2023 08:36 AM    MCHC 33.8 07/10/2023 08:36 AM    RDW 45.6 07/10/2023 08:36 AM    PLATELETCT 269 07/10/2023 08:36 AM    MPV 9.0 07/10/2023 08:36 AM    NEUTSPOLYS 50.60 07/10/2023 08:36 AM    LYMPHOCYTES 34.40 07/10/2023 08:36 AM    MONOCYTES 10.90 07/10/2023 08:36 AM    EOSINOPHILS 2.50 07/10/2023 08:36 AM    BASOPHILS 1.10 07/10/2023 08:36 AM    IMMGRAN 0.50 07/10/2023 08:36 AM    NRBC 0.00 07/10/2023 08:36 AM    NEUTS 3.20 07/10/2023 08:36 AM    LYMPHS 2.18 07/10/2023 08:36 AM    MONOS 0.69 07/10/2023 08:36 AM    EOS 0.16 07/10/2023 08:36 AM    BASO 0.07 07/10/2023 08:36 AM    IMMGRANAB 0.03 07/10/2023 08:36 AM    NRBCAB 0.00 07/10/2023 08:36 AM        CBC w/o DIFF  Lab Results   Component Value Date/Time    WBC 6.3 07/10/2023 08:36 AM    RBC 4.66 07/10/2023 08:36 AM    HEMOGLOBIN 15.0 07/10/2023 08:36 AM    MCV 95.3 07/10/2023 08:36 AM    MCH 32.2 07/10/2023 08:36 AM    " MCHC 33.8 07/10/2023 08:36 AM    RDW 45.6 07/10/2023 08:36 AM    MPV 9.0 07/10/2023 08:36 AM       LIVER:  Lab Results   Component Value Date/Time    ALKPHOSPHAT 66 07/10/2023 08:36 AM    ASTSGOT 45 07/10/2023 08:36 AM    ALTSGPT 42 07/10/2023 08:36 AM    TBILIRUBIN 0.4 07/10/2023 08:36 AM       BNP:  Lab Results   Component Value Date/Time    BNPBTYPENAT 127 (H) 03/18/2014 09:28 AM       PT/INR:  Lab Results   Component Value Date/Time    PROTHROMBTM 12.6 10/22/2015 03:59 AM    PROTHROMBTM 13.1 03/18/2014 09:28 AM    INR 0.94 10/22/2015 03:59 AM    INR 1.00 03/18/2014 09:28 AM             Impression/Plan:  HTN (hypertension)   - well controlled continue losartan    Symptomatic PVCs   - well controlled continue metoprolol   - only needs to come back as needed   - metoprolol dn losartan can be done by PCP long term              A total of 15 minutes of time was spent on day of encounter reviewing medical record, performing history and examination, counseling, ordering medication/test/consults, collaborating with referring service, and documentation.    Jorden Gonzales [facility]  Cardiac Electrophysiology

## 2024-03-25 NOTE — ASSESSMENT & PLAN NOTE
- well controlled continue metoprolol   - only needs to come back as needed   - metoprolol dn losartan can be done by PCP long term

## 2024-04-05 ENCOUNTER — TELEPHONE (OUTPATIENT)
Dept: HEALTH INFORMATION MANAGEMENT | Facility: OTHER | Age: 69
End: 2024-04-05

## 2024-04-08 PROCEDURE — RXMED WILLOW AMBULATORY MEDICATION CHARGE: Performed by: OBSTETRICS & GYNECOLOGY

## 2024-04-09 ENCOUNTER — PHARMACY VISIT (OUTPATIENT)
Dept: PHARMACY | Facility: MEDICAL CENTER | Age: 69
End: 2024-04-09
Payer: COMMERCIAL

## 2024-04-12 DIAGNOSIS — E55.9 VITAMIN D DEFICIENCY: ICD-10-CM

## 2024-04-12 DIAGNOSIS — E78.00 PURE HYPERCHOLESTEROLEMIA: ICD-10-CM

## 2024-04-12 DIAGNOSIS — R73.09 ELEVATED GLUCOSE: ICD-10-CM

## 2024-04-17 ENCOUNTER — TELEPHONE (OUTPATIENT)
Dept: HEALTH INFORMATION MANAGEMENT | Facility: OTHER | Age: 69
End: 2024-04-17
Payer: MEDICARE

## 2024-04-17 DIAGNOSIS — Z78.0 POSTMENOPAUSAL: ICD-10-CM

## 2024-04-17 NOTE — TELEPHONE ENCOUNTER
2. SPECIFIC Action To Be Taken: Orders pending, please sign.    3. Diagnosis/Clinical Reason for Request:    Nondisplaced fracture of third metatarsal bone, left foot, initial encounter for closed fracture. Due to patient fracture medicare request Osteoporosis screening to be completed within 6 months of fracture date.     4. Specialty & Provider Name/Lab/Imaging Location: [facility]     5. Is appointment scheduled for requested order/referral: no    Patient was not informed they will receive a return phone call from the office ONLY if there are any questions before processing their request. Advised to call back if they haven't received a call from the referral department in 5 days.

## 2024-04-30 ENCOUNTER — TELEPHONE (OUTPATIENT)
Dept: HEALTH INFORMATION MANAGEMENT | Facility: OTHER | Age: 69
End: 2024-04-30
Payer: MEDICARE

## 2024-05-07 DIAGNOSIS — I49.3 SYMPTOMATIC PVCS: ICD-10-CM

## 2024-05-07 DIAGNOSIS — R00.2 PALPITATIONS: ICD-10-CM

## 2024-05-08 ENCOUNTER — HOSPITAL ENCOUNTER (OUTPATIENT)
Dept: LAB | Facility: MEDICAL CENTER | Age: 69
End: 2024-05-08
Attending: FAMILY MEDICINE
Payer: MEDICARE

## 2024-05-08 DIAGNOSIS — E55.9 VITAMIN D DEFICIENCY: ICD-10-CM

## 2024-05-08 DIAGNOSIS — E78.00 PURE HYPERCHOLESTEROLEMIA: ICD-10-CM

## 2024-05-08 DIAGNOSIS — R73.09 ELEVATED GLUCOSE: ICD-10-CM

## 2024-05-08 LAB
BASOPHILS # BLD AUTO: 1.1 % (ref 0–1.8)
BASOPHILS # BLD: 0.06 K/UL (ref 0–0.12)
EOSINOPHIL # BLD AUTO: 0.18 K/UL (ref 0–0.51)
EOSINOPHIL NFR BLD: 3.2 % (ref 0–6.9)
ERYTHROCYTE [DISTWIDTH] IN BLOOD BY AUTOMATED COUNT: 45.4 FL (ref 35.9–50)
EST. AVERAGE GLUCOSE BLD GHB EST-MCNC: 77 MG/DL
HBA1C MFR BLD: 4.3 % (ref 4–5.6)
HCT VFR BLD AUTO: 44.1 % (ref 37–47)
HGB BLD-MCNC: 14.9 G/DL (ref 12–16)
IMM GRANULOCYTES # BLD AUTO: 0.01 K/UL (ref 0–0.11)
IMM GRANULOCYTES NFR BLD AUTO: 0.2 % (ref 0–0.9)
LYMPHOCYTES # BLD AUTO: 1.6 K/UL (ref 1–4.8)
LYMPHOCYTES NFR BLD: 28.3 % (ref 22–41)
MCH RBC QN AUTO: 33.3 PG (ref 27–33)
MCHC RBC AUTO-ENTMCNC: 33.8 G/DL (ref 32.2–35.5)
MCV RBC AUTO: 98.4 FL (ref 81.4–97.8)
MONOCYTES # BLD AUTO: 0.82 K/UL (ref 0–0.85)
MONOCYTES NFR BLD AUTO: 14.5 % (ref 0–13.4)
NEUTROPHILS # BLD AUTO: 2.98 K/UL (ref 1.82–7.42)
NEUTROPHILS NFR BLD: 52.7 % (ref 44–72)
NRBC # BLD AUTO: 0 K/UL
NRBC BLD-RTO: 0 /100 WBC (ref 0–0.2)
PLATELET # BLD AUTO: 291 K/UL (ref 164–446)
PMV BLD AUTO: 9.7 FL (ref 9–12.9)
RBC # BLD AUTO: 4.48 M/UL (ref 4.2–5.4)
WBC # BLD AUTO: 5.7 K/UL (ref 4.8–10.8)

## 2024-05-08 RX ORDER — METOPROLOL SUCCINATE 25 MG/1
25 TABLET, EXTENDED RELEASE ORAL
Qty: 100 TABLET | Refills: 2 | Status: SHIPPED | OUTPATIENT
Start: 2024-05-08 | End: 2024-05-31 | Stop reason: SDUPTHER

## 2024-05-09 LAB
25(OH)D3 SERPL-MCNC: 80 NG/ML (ref 30–100)
ALBUMIN SERPL BCP-MCNC: 4.7 G/DL (ref 3.2–4.9)
ALBUMIN/GLOB SERPL: 2.2 G/DL
ALP SERPL-CCNC: 57 U/L (ref 30–99)
ALT SERPL-CCNC: 51 U/L (ref 2–50)
ANION GAP SERPL CALC-SCNC: 13 MMOL/L (ref 7–16)
AST SERPL-CCNC: 50 U/L (ref 12–45)
BILIRUB SERPL-MCNC: 0.5 MG/DL (ref 0.1–1.5)
BUN SERPL-MCNC: 17 MG/DL (ref 8–22)
CALCIUM ALBUM COR SERPL-MCNC: 9 MG/DL (ref 8.5–10.5)
CALCIUM SERPL-MCNC: 9.6 MG/DL (ref 8.5–10.5)
CHLORIDE SERPL-SCNC: 106 MMOL/L (ref 96–112)
CHOLEST SERPL-MCNC: 177 MG/DL (ref 100–199)
CO2 SERPL-SCNC: 22 MMOL/L (ref 20–33)
CREAT SERPL-MCNC: 0.6 MG/DL (ref 0.5–1.4)
GFR SERPLBLD CREATININE-BSD FMLA CKD-EPI: 98 ML/MIN/1.73 M 2
GLOBULIN SER CALC-MCNC: 2.1 G/DL (ref 1.9–3.5)
GLUCOSE SERPL-MCNC: 81 MG/DL (ref 65–99)
HDLC SERPL-MCNC: 82 MG/DL
LDLC SERPL CALC-MCNC: 84 MG/DL
POTASSIUM SERPL-SCNC: 4.3 MMOL/L (ref 3.6–5.5)
PROT SERPL-MCNC: 6.8 G/DL (ref 6–8.2)
SODIUM SERPL-SCNC: 141 MMOL/L (ref 135–145)
T3FREE SERPL-MCNC: 2.61 PG/ML (ref 2–4.4)
T4 FREE SERPL-MCNC: 1.04 NG/DL (ref 0.93–1.7)
TRIGL SERPL-MCNC: 57 MG/DL (ref 0–149)
TSH SERPL DL<=0.005 MIU/L-ACNC: 1.26 UIU/ML (ref 0.38–5.33)

## 2024-05-31 DIAGNOSIS — R00.2 PALPITATIONS: ICD-10-CM

## 2024-05-31 DIAGNOSIS — I49.3 SYMPTOMATIC PVCS: ICD-10-CM

## 2024-05-31 RX ORDER — METOPROLOL SUCCINATE 25 MG/1
25 TABLET, EXTENDED RELEASE ORAL 2 TIMES DAILY
Qty: 180 TABLET | Refills: 0 | Status: SHIPPED | OUTPATIENT
Start: 2024-05-31

## 2024-06-13 ENCOUNTER — HOSPITAL ENCOUNTER (OUTPATIENT)
Dept: RADIOLOGY | Facility: MEDICAL CENTER | Age: 69
End: 2024-06-13
Attending: FAMILY MEDICINE
Payer: MEDICARE

## 2024-06-13 DIAGNOSIS — Z78.0 POSTMENOPAUSAL: ICD-10-CM

## 2024-06-13 PROCEDURE — 77080 DXA BONE DENSITY AXIAL: CPT

## 2024-06-18 PROCEDURE — RXMED WILLOW AMBULATORY MEDICATION CHARGE: Performed by: NURSE PRACTITIONER

## 2024-06-18 PROCEDURE — RXMED WILLOW AMBULATORY MEDICATION CHARGE: Performed by: FAMILY MEDICINE

## 2024-06-19 ENCOUNTER — PHARMACY VISIT (OUTPATIENT)
Dept: PHARMACY | Facility: MEDICAL CENTER | Age: 69
End: 2024-06-19
Payer: COMMERCIAL

## 2024-06-27 PROCEDURE — RXMED WILLOW AMBULATORY MEDICATION CHARGE: Performed by: OBSTETRICS & GYNECOLOGY

## 2024-07-05 ENCOUNTER — PHARMACY VISIT (OUTPATIENT)
Dept: PHARMACY | Facility: MEDICAL CENTER | Age: 69
End: 2024-07-05
Payer: COMMERCIAL

## 2024-07-11 ENCOUNTER — APPOINTMENT (OUTPATIENT)
Dept: RADIOLOGY | Facility: MEDICAL CENTER | Age: 69
End: 2024-07-11
Attending: OBSTETRICS & GYNECOLOGY
Payer: MEDICARE

## 2024-07-24 ENCOUNTER — HOSPITAL ENCOUNTER (OUTPATIENT)
Dept: RADIOLOGY | Facility: MEDICAL CENTER | Age: 69
End: 2024-07-24
Attending: OBSTETRICS & GYNECOLOGY
Payer: MEDICARE

## 2024-07-24 DIAGNOSIS — Z00.00 ROUTINE GENERAL MEDICAL EXAMINATION AT A HEALTH CARE FACILITY: ICD-10-CM

## 2024-07-24 PROCEDURE — 77063 BREAST TOMOSYNTHESIS BI: CPT

## 2024-07-25 PROCEDURE — RXMED WILLOW AMBULATORY MEDICATION CHARGE: Performed by: OBSTETRICS & GYNECOLOGY

## 2024-07-26 ENCOUNTER — PHARMACY VISIT (OUTPATIENT)
Dept: PHARMACY | Facility: MEDICAL CENTER | Age: 69
End: 2024-07-26
Payer: COMMERCIAL

## 2024-07-26 PROCEDURE — RXMED WILLOW AMBULATORY MEDICATION CHARGE: Performed by: OBSTETRICS & GYNECOLOGY

## 2024-07-26 RX ORDER — ESTRADIOL 0.1 MG/G
CREAM VAGINAL
Qty: 42.5 G | Refills: 11 | OUTPATIENT
Start: 2024-07-26

## 2024-07-26 RX ORDER — CLOBETASOL PROPIONATE 0.5 MG/G
OINTMENT TOPICAL
Qty: 45 G | Refills: 11 | OUTPATIENT
Start: 2024-07-26

## 2024-08-27 ENCOUNTER — APPOINTMENT (OUTPATIENT)
Dept: MEDICAL GROUP | Age: 69
End: 2024-08-27
Payer: MEDICARE

## 2024-08-27 VITALS
DIASTOLIC BLOOD PRESSURE: 80 MMHG | SYSTOLIC BLOOD PRESSURE: 128 MMHG | OXYGEN SATURATION: 97 % | HEART RATE: 80 BPM | RESPIRATION RATE: 18 BRPM | BODY MASS INDEX: 22.44 KG/M2 | TEMPERATURE: 97.8 F | HEIGHT: 67 IN | WEIGHT: 143 LBS

## 2024-08-27 DIAGNOSIS — Z00.00 ANNUAL PHYSICAL EXAM: ICD-10-CM

## 2024-08-27 DIAGNOSIS — E16.2 LOW BLOOD GLUCOSE MEASUREMENT: ICD-10-CM

## 2024-08-27 DIAGNOSIS — I10 PRIMARY HYPERTENSION: ICD-10-CM

## 2024-08-27 DIAGNOSIS — Z29.89 NEED FOR MALARIA PROPHYLAXIS: ICD-10-CM

## 2024-08-27 DIAGNOSIS — Z12.11 SCREENING FOR COLON CANCER: ICD-10-CM

## 2024-08-27 PROBLEM — R73.09 ELEVATED GLUCOSE: Status: ACTIVE | Noted: 2024-08-27

## 2024-08-27 PROCEDURE — 99214 OFFICE O/P EST MOD 30 MIN: CPT | Performed by: FAMILY MEDICINE

## 2024-08-27 PROCEDURE — 3079F DIAST BP 80-89 MM HG: CPT | Performed by: FAMILY MEDICINE

## 2024-08-27 PROCEDURE — 3074F SYST BP LT 130 MM HG: CPT | Performed by: FAMILY MEDICINE

## 2024-08-27 ASSESSMENT — FIBROSIS 4 INDEX: FIB4 SCORE: 1.64

## 2024-08-27 ASSESSMENT — PATIENT HEALTH QUESTIONNAIRE - PHQ9: CLINICAL INTERPRETATION OF PHQ2 SCORE: 0

## 2024-08-27 NOTE — PROGRESS NOTES
This medical record contains text that has been entered with the assistance of computer voice recognition and dictation software.  Therefore, it may contain unintended errors in text, spelling, punctuation, or grammar      Verbal consent was acquired by the patient to use [employer] ambient listening note generation during this visit Yes       Chief Complaint   Patient presents with    Follow-Up     TRIP          Keisha Lyle is a 69 y.o. female here evaluation and management of: routine follow up    HPI:           1.. Low blood glucose measurement        2. Need for malaria prophylaxis      3. Screening for colon cancer      4. Annual labs      5. Primary hypertension    History of Present Illness  The patient is a 69-year-old female who presents for evaluation of multiple medical concerns.    She has plans to travel to [facility] in March 2025 and the [facility] in November 2024, with each trip lasting 21 days. She is seeking a yellow fever vaccine and malaria prophylaxis in preparation for these trips.    She is interested in obtaining a continuous glucose monitor to better manage her health. Since January 2024, she has lost 40 pounds, attributing some of this weight loss to muscle loss. She is currently working with a  and attending the gym weekly. Her weight loss efforts are supported by semaglutide, prescribed by a nurse practitioner in [place]. She is in the process of reducing her semaglutide dosage as she has achieved her desired weight loss. She believes the continuous glucose monitor will help her identify which foods cause her insulin levels to rise and maintain high levels. She does not anticipate needing the monitor indefinitely, but would like to use it to consistently track her blood sugar levels. She currently uses a glucose monitor and has noticed her blood sugar levels can drop to 42 without any accompanying symptoms. She suspects this may be due to the  semaglutide.    She is also considering an ApoB test to monitor her triglycerides and their interaction with her LDL cholesterol.    She has been trying to get a colonoscopy since 06/2024. She finally made an appointment with [facility] in [place] and will see them on the 30th of this month. She will get her colonoscopy after that. They said she cannot have it until she sees their nurse practitioner because she sees a cardiologist.        Current medicines (including changes today)  Current Outpatient Medications   Medication Sig Dispense Refill    Blood Glucose Monitoring Suppl Device Meter: Dispense FREESTYLE MARCUS Sig. Use as directed for blood sugar monitoring. #1. NR. 2 Each 0    clobetasol (TEMOVATE) 0.05 % Ointment Apply a thin layer to affected areas at  bedtime two times a week 45 g 11    estradiol (ESTRACE) 0.1 MG/GM vaginal cream Apply a thin layer to affected areas 2 x weekly 42.5 g 11    estradiol (VIVELLE) 0.0375 MG/24HR patch Apply 1 patch to clean skin 2 times weekly 24 Patch 3    metoprolol SR (TOPROL XL) 25 MG TABLET SR 24 HR Take 1 Tablet by mouth 2 times a day. 180 Tablet 0    losartan (COZAAR) 100 MG Tab TAKE 1 TABLET BY MOUTH EVERY  Tablet 2    ondansetron (ZOFRAN) 4 MG Tab tablet Take 1 Tablet by mouth every four hours as needed for Nausea/Vomiting. 30 Tablet 2    Ascorbic Acid (VITAMIN C CR) 1500 MG Tab CR Take  by mouth.      vitamin D (CHOLECALCIFEROL) 1000 UNIT Tab Take 1,000 Units by mouth every day.      omeprazole (PRILOSEC) 20 MG delayed-release capsule Take 1 Cap by mouth 2 times a day. Indications: **OTC**      multivitamin (THERAGRAN) TABS Take 1 Tab by mouth every day. Indications: **OTC**      docosahexanoic acid (OMEGA 3 FA) 1000 MG CAPS Take 1,000 mg by mouth 2 Times a Day. Indications: **OTC**       No current facility-administered medications for this visit.     She  has a past medical history of Hypertension.    She has no past medical history of Breast cancer  "(HCC).  She  has a past surgical history that includes other abdominal surgery; gyn surgery; and other.  Social History     Tobacco Use    Smoking status: Never    Smokeless tobacco: Never   Vaping Use    Vaping status: Never Used   Substance Use Topics    Alcohol use: Yes     Alcohol/week: 8.4 oz     Types: 14 Glasses of wine per week     Comment: 1-2 glasses every day    Drug use: Never     Social History     Social History Narrative    Not on file     Family History   Problem Relation Age of Onset    Cancer Paternal Grandmother         breast     Family Status   Relation Name Status    Mo  Alive    Fa  Alive    PGMo  (Not Specified)    Sis  Alive    Bro  Alive    Bro  Alive    Bro  Alive   No partnership data on file         ROS    The pertinent  ROS findings can be seen in the HPI above.     All other systems reviewed and are negative     Objective:     /80 (BP Location: Left arm, Patient Position: Sitting, BP Cuff Size: Large adult)   Pulse 80   Temp 36.6 °C (97.8 °F) (Temporal)   Resp 18   Ht 1.702 m (5' 7\")   Wt 64.9 kg (143 lb)   SpO2 97%  Body mass index is 22.4 kg/m².      Physical Exam:    Constitutional: Alert, no distress.  Skin: No suspicious lesions  Eye: Equal, round and reactive, conjunctiva clear, lids normal.  ENMT: Lips without lesions, good dentition, oropharynx clear.  Neck: Trachea midline, no masses, no thyromegaly. No cervical or supraclavicular lymphadenopathy.  Respiratory: Unlabored respiratory effort, lungs clear to auscultation, no wheezes, no ronchi.  Cardiovascular: Normal S1, S2, no murmur, no edema  Abdomen: Soft, non-tender, no masses, no hepatosplenomegaly.        Assessment and Plan:   The following treatment plan was discussed    All recent labs and provider notes reviewed    1. Low blood glucose measurement  The patient states that she is experienced low blood glucose show she would like this monitor to prevent any adverse events.    - Blood Glucose Monitoring Suppl " Device; Meter: Dispense FREESTYLE MARCUS Sig. Use as directed for blood sugar monitoring. #1. NR.  Dispense: 2 Each; Refill: 0          2. Need for malaria prophylaxis    - [facility]    3. Screening for colon cancer    - Referral to GI for Colonoscopy    4. Annual labs    - [facility]  - APOLIPOPROTEIN B; Future    5. Primary hypertension  Patient has been stable with current management  We will make no changes for now      Instructed to Follow up in clinic or ER for worsening symptoms, difficulty breathing, lack of expected recovery, or should new symptoms or problems arise.    Followup: Return in about 6 months (around 2/27/2025) for Reevaluation, labs.

## 2024-08-28 RX ORDER — DIPHENHYDRAMINE HYDROCHLORIDE 25 MG/1
CAPSULE, LIQUID FILLED ORAL
Qty: 1 KIT | Refills: 0 | Status: SHIPPED | OUTPATIENT
Start: 2024-08-28

## 2024-08-30 PROCEDURE — RXMED WILLOW AMBULATORY MEDICATION CHARGE: Performed by: PHYSICIAN ASSISTANT

## 2024-09-03 ENCOUNTER — PHARMACY VISIT (OUTPATIENT)
Dept: PHARMACY | Facility: MEDICAL CENTER | Age: 69
End: 2024-09-03
Payer: COMMERCIAL

## 2024-09-12 ENCOUNTER — HOSPITAL ENCOUNTER (OUTPATIENT)
Dept: LAB | Facility: MEDICAL CENTER | Age: 69
End: 2024-09-12
Attending: FAMILY MEDICINE
Payer: MEDICARE

## 2024-09-12 DIAGNOSIS — Z00.00 ANNUAL PHYSICAL EXAM: ICD-10-CM

## 2024-09-12 PROCEDURE — 82172 ASSAY OF APOLIPOPROTEIN: CPT

## 2024-09-12 PROCEDURE — 36415 COLL VENOUS BLD VENIPUNCTURE: CPT

## 2024-09-12 SDOH — HEALTH STABILITY: PHYSICAL HEALTH: ON AVERAGE, HOW MANY DAYS PER WEEK DO YOU ENGAGE IN MODERATE TO STRENUOUS EXERCISE (LIKE A BRISK WALK)?: 4 DAYS

## 2024-09-12 SDOH — ECONOMIC STABILITY: FOOD INSECURITY: WITHIN THE PAST 12 MONTHS, THE FOOD YOU BOUGHT JUST DIDN'T LAST AND YOU DIDN'T HAVE MONEY TO GET MORE.: NEVER TRUE

## 2024-09-12 SDOH — ECONOMIC STABILITY: INCOME INSECURITY: IN THE LAST 12 MONTHS, WAS THERE A TIME WHEN YOU WERE NOT ABLE TO PAY THE MORTGAGE OR RENT ON TIME?: NO

## 2024-09-12 SDOH — ECONOMIC STABILITY: FOOD INSECURITY: WITHIN THE PAST 12 MONTHS, YOU WORRIED THAT YOUR FOOD WOULD RUN OUT BEFORE YOU GOT MONEY TO BUY MORE.: NEVER TRUE

## 2024-09-12 SDOH — HEALTH STABILITY: MENTAL HEALTH
STRESS IS WHEN SOMEONE FEELS TENSE, NERVOUS, ANXIOUS, OR CAN'T SLEEP AT NIGHT BECAUSE THEIR MIND IS TROUBLED. HOW STRESSED ARE YOU?: NOT AT ALL

## 2024-09-12 SDOH — HEALTH STABILITY: PHYSICAL HEALTH: ON AVERAGE, HOW MANY MINUTES DO YOU ENGAGE IN EXERCISE AT THIS LEVEL?: 60 MIN

## 2024-09-12 SDOH — ECONOMIC STABILITY: INCOME INSECURITY: HOW HARD IS IT FOR YOU TO PAY FOR THE VERY BASICS LIKE FOOD, HOUSING, MEDICAL CARE, AND HEATING?: NOT HARD AT ALL

## 2024-09-12 ASSESSMENT — LIFESTYLE VARIABLES
SKIP TO QUESTIONS 9-10: 1
HOW OFTEN DO YOU HAVE SIX OR MORE DRINKS ON ONE OCCASION: NEVER
HOW OFTEN DO YOU HAVE A DRINK CONTAINING ALCOHOL: 4 OR MORE TIMES A WEEK
AUDIT-C TOTAL SCORE: 4
HOW MANY STANDARD DRINKS CONTAINING ALCOHOL DO YOU HAVE ON A TYPICAL DAY: 1 OR 2

## 2024-09-12 ASSESSMENT — SOCIAL DETERMINANTS OF HEALTH (SDOH)
DO YOU BELONG TO ANY CLUBS OR ORGANIZATIONS SUCH AS CHURCH GROUPS UNIONS, FRATERNAL OR ATHLETIC GROUPS, OR SCHOOL GROUPS?: NO
IN A TYPICAL WEEK, HOW MANY TIMES DO YOU TALK ON THE PHONE WITH FAMILY, FRIENDS, OR NEIGHBORS?: MORE THAN THREE TIMES A WEEK
HOW OFTEN DO YOU ATTENT MEETINGS OF THE CLUB OR ORGANIZATION YOU BELONG TO?: NEVER
DO YOU BELONG TO ANY CLUBS OR ORGANIZATIONS SUCH AS CHURCH GROUPS UNIONS, FRATERNAL OR ATHLETIC GROUPS, OR SCHOOL GROUPS?: NO
HOW OFTEN DO YOU HAVE SIX OR MORE DRINKS ON ONE OCCASION: NEVER
HOW OFTEN DO YOU GET TOGETHER WITH FRIENDS OR RELATIVES?: TWICE A WEEK
HOW OFTEN DO YOU ATTENT MEETINGS OF THE CLUB OR ORGANIZATION YOU BELONG TO?: NEVER
HOW OFTEN DO YOU ATTEND CHURCH OR RELIGIOUS SERVICES?: NEVER
IN THE PAST 12 MONTHS, HAS THE ELECTRIC, GAS, OIL, OR WATER COMPANY THREATENED TO SHUT OFF SERVICE IN YOUR HOME?: NO
WITHIN THE PAST 12 MONTHS, YOU WORRIED THAT YOUR FOOD WOULD RUN OUT BEFORE YOU GOT THE MONEY TO BUY MORE: NEVER TRUE
HOW MANY DRINKS CONTAINING ALCOHOL DO YOU HAVE ON A TYPICAL DAY WHEN YOU ARE DRINKING: 1 OR 2
HOW OFTEN DO YOU HAVE A DRINK CONTAINING ALCOHOL: 4 OR MORE TIMES A WEEK
HOW OFTEN DO YOU GET TOGETHER WITH FRIENDS OR RELATIVES?: TWICE A WEEK
HOW HARD IS IT FOR YOU TO PAY FOR THE VERY BASICS LIKE FOOD, HOUSING, MEDICAL CARE, AND HEATING?: NOT HARD AT ALL
HOW OFTEN DO YOU ATTEND CHURCH OR RELIGIOUS SERVICES?: NEVER
IN A TYPICAL WEEK, HOW MANY TIMES DO YOU TALK ON THE PHONE WITH FAMILY, FRIENDS, OR NEIGHBORS?: MORE THAN THREE TIMES A WEEK

## 2024-09-13 ENCOUNTER — OFFICE VISIT (OUTPATIENT)
Dept: MEDICAL GROUP | Age: 69
End: 2024-09-13
Payer: MEDICARE

## 2024-09-13 VITALS
HEART RATE: 81 BPM | HEIGHT: 66 IN | DIASTOLIC BLOOD PRESSURE: 60 MMHG | OXYGEN SATURATION: 94 % | SYSTOLIC BLOOD PRESSURE: 108 MMHG | WEIGHT: 139 LBS | BODY MASS INDEX: 22.34 KG/M2 | TEMPERATURE: 97.4 F

## 2024-09-13 DIAGNOSIS — I10 PRIMARY HYPERTENSION: ICD-10-CM

## 2024-09-13 DIAGNOSIS — Z00.00 MEDICARE ANNUAL WELLNESS VISIT, INITIAL: ICD-10-CM

## 2024-09-13 DIAGNOSIS — E78.00 PURE HYPERCHOLESTEROLEMIA: ICD-10-CM

## 2024-09-13 DIAGNOSIS — E55.9 VITAMIN D DEFICIENCY: ICD-10-CM

## 2024-09-13 DIAGNOSIS — D48.9 NEOPLASM OF UNCERTAIN BEHAVIOR: ICD-10-CM

## 2024-09-13 DIAGNOSIS — K21.9 GASTROESOPHAGEAL REFLUX DISEASE WITHOUT ESOPHAGITIS: ICD-10-CM

## 2024-09-13 PROCEDURE — 3074F SYST BP LT 130 MM HG: CPT | Performed by: FAMILY MEDICINE

## 2024-09-13 PROCEDURE — 3078F DIAST BP <80 MM HG: CPT | Performed by: FAMILY MEDICINE

## 2024-09-13 PROCEDURE — G0439 PPPS, SUBSEQ VISIT: HCPCS | Performed by: FAMILY MEDICINE

## 2024-09-13 RX ORDER — ONDANSETRON 4 MG/1
4 TABLET, FILM COATED ORAL EVERY 4 HOURS PRN
Qty: 30 TABLET | Refills: 2 | Status: SHIPPED | OUTPATIENT
Start: 2024-09-13

## 2024-09-13 ASSESSMENT — FIBROSIS 4 INDEX: FIB4 SCORE: 1.64

## 2024-09-13 ASSESSMENT — ENCOUNTER SYMPTOMS: GENERAL WELL-BEING: GOOD

## 2024-09-13 ASSESSMENT — ACTIVITIES OF DAILY LIVING (ADL): BATHING_REQUIRES_ASSISTANCE: 0

## 2024-09-13 ASSESSMENT — PATIENT HEALTH QUESTIONNAIRE - PHQ9: CLINICAL INTERPRETATION OF PHQ2 SCORE: 0

## 2024-09-13 NOTE — PROGRESS NOTES
HPI:    Keisha Lyle is a 69 y.o. here today for a Medicare Annual Wellness Visit.       History of Present Illness  The patient is a 69-year-old female who presents for her annual exam.    She is here for her annual check-up.      Patient Active Problem List    Diagnosis Date Noted    Elevated glucose 08/27/2024    Low blood glucose measurement 08/27/2024    Symptomatic PVCs 08/10/2023    Dupuytren's disease 04/03/2019    Tinnitus of both ears 04/03/2019    Hypercalcemia 09/05/2018    Vitamin D deficiency 09/05/2018    Screening for colon cancer 03/02/2018    Annual physical exam 03/02/2018    Decreased libido 03/02/2018    Need for vaccination 03/02/2018    Inflamed seborrheic keratosis 03/02/2018    AK (actinic keratosis) 03/02/2018    GERD (gastroesophageal reflux disease) 10/22/2015    Mitral valve disorder 09/08/2014    Epigastric pain 03/18/2014    HTN (hypertension) 03/17/2014    Hypokalemia 03/16/2014    Lower urinary tract infectious disease 03/16/2014            Current supplements as per medication list.         Allergies: Erythromycin and Inapsine [droperidol]         Current social contact/activities: GYM 3-4 TIMES A WEEK AND AT HOME         She  reports that she has never smoked. She has never used smokeless tobacco. She reports current alcohol use of about 8.4 oz of alcohol per week. She reports that she does not use drugs.         Lives with at home: SPOUSE    Any caregivers: NO    Is the caregiver paid or unpaid: NO         ROS:     Gait: Uses no assistive device    Ostomy: No    Other tubes: No    Amputations: No    Chronic oxygen use: No    Last eye exam: 09/2024    Wears hearing aids: No    : Denies any urinary leakage during the last 6 months         Depression Screening  Little interest or pleasure in doing things?  0 - not at all  Feeling down, depressed , or hopeless? 0 - not at all  Trouble falling or staying asleep, or sleeping too much?     Feeling tired or having little  energy?     Poor appetite or overeating?     Feeling bad about yourself - or that you are a failure or have let yourself or your family down?    Trouble concentrating on things, such as reading the newspaper or watching television?    Moving or speaking so slowly that other people could have noticed.  Or the opposite - being so fidgety or restless that you have been moving around a lot more than usual?     Thoughts that you would be better off dead, or of hurting yourself?     Patient Health Questionnaire Score:      If depressive symptoms identified deferred to follow up visit unless specifically addressed in assessment and plan.    Interpretation of PHQ-9 Total Score   Score Severity   1-4 No Depression   5-9 Mild Depression   10-14 Moderate Depression   15-19 Moderately Severe Depression   20-27 Severe Depression    Screening for Cognitive Impairment  Do you or any of your friends or family members have any concern about your memory? No  Three Minute Recall (Leader, Season, Table) 3/3    Александр clock face with all 12 numbers and set the hands to show 10 minutes after 11.  Yes    Cognitive concerns identified deferred for follow up unless specifically addressed in assessment and plan.    Fall Risk Assessment  Has the patient had two or more falls in the last year or any fall with injury in the last year?  No    Safety Assessment  Do you always wear your seatbelt?  Yes  Any changes to home needed to function safely? No  Difficulty hearing.  No  Patient counseled about all safety risks that were identified.    Functional Assessment ADLs  Are there any barriers preventing you from cooking for yourself or meeting nutritional needs?  No.    Are there any barriers preventing you from driving safely or obtaining transportation?  No.    Are there any barriers preventing you from using a telephone or calling for help?  No    Are there any barriers preventing you from shopping?  No.    Are there any barriers preventing you  from taking care of your own finances?  No    Are there any barriers preventing you from managing your medications?  No    Are there any barriers preventing you from showering, bathing or dressing yourself? No    Are there any barriers preventing you from doing housework or laundry? No    Are there any barriers preventing you from using the toilet?No    Are you currently engaging in any exercise or physical activity?  Yes.      Self-Assessment of Health  What is your perception of your health? Good    Do you sleep more than six hours a night? Yes    In the past 7 days, how much did pain keep you from doing your normal work? Some    Do you spend quality time with family or friends (virtually or in person)? Yes    Do you usually eat a heart healthy diet that constists of a variety of fruits, vegetables, whole grains and fiber? Yes    Do you eat foods high in fat and/or Fast Food more than three times per week? No    How concerned are you that your medical conditions are not being well managed? Not at all    Are you worried that in the next 2 months, you may not have stable housing that you own, rent, or stay in as part of a household? No        Advance Care Planning  Do you have an Advance Directive, Living Will, Durable Power of , or POLST? Yes  Advance Directive Living Will Durable Power of  POLST is not on file - instructed patient to bring in a copy to scan into their chart      Health Maintenance Summary            Ordered - Colorectal Cancer Screening (Colonoscopy - Every 5 Years) Ordered on 8/27/2024 06/13/2019  REFERRAL TO GI FOR COLONOSCOPY    05/25/2018  REFERRAL TO GI FOR COLONOSCOPY              Overdue - Influenza Vaccine (1) Overdue since 9/1/2024 09/12/2023  Outside Immunization: Influenza Quad Adjuvanted    09/12/2022  Imm Admin: Influenza, Unspecified - HISTORICAL DATA    08/28/2021  Imm Admin: Influenza Vaccine Adult HD    08/29/2020  Imm Admin: Influenza, Unspecified -  HISTORICAL DATA    10/02/2019  Imm Admin: Influenza Vaccine Quad Inj (Pf)    Only the first 5 history entries have been loaded, but more history exists.              Overdue - COVID-19 Vaccine (6 - 2023-24 season) Overdue since 9/1/2024 09/20/2023  Outside Immunization: COVID-19(PFR) 12yrs \T\ up    11/18/2022  Imm Admin: MODERNA BIVALENT BOOSTER SARS-COV-2 VACCINE (6+)    02/26/2022  Imm Admin: MODERNA SARS-COV-2 VACCINE (12+)    08/31/2021  Outside Immunization: COVID-19 mRNA (MOD)    01/20/2021  Imm Admin: MODERNA SARS-COV-2 VACCINE (12+)    Only the first 5 history entries have been loaded, but more history exists.              Annual Wellness Visit (Yearly) Next due on 9/13/2025 09/13/2024  Visit Dx: Medicare annual wellness visit, initial    09/13/2024  Level of Service: [place] ANNUAL WELLNESS VISIT-INCLUDES PPPS SUBSEQUE*    07/13/2023  Visit Dx: Medicare annual wellness visit, initial    06/07/2022  Visit Dx: Medicare annual wellness visit, initial    02/16/2021  Visit Dx: Medicare annual wellness visit, initial    Only the first 5 history entries have been loaded, but more history exists.              Mammogram (Every 2 Years) Tentatively due on 7/24/2026 07/24/2024  MA-SCREENING MAMMO BILAT W/TOMOSYNTHESIS W/CAD    07/10/2023  MA-SCREENING MAMMO BILAT W/TOMOSYNTHESIS W/CAD    07/08/2022  MA-SCREENING MAMMO BILAT W/TOMOSYNTHESIS W/CAD    07/06/2021  MA-SCREENING MAMMO BILAT W/TOMOSYNTHESIS W/CAD    06/25/2020  MA-SCREENING MAMMO BILAT W/TOMOSYNTHESIS W/CAD    Only the first 5 history entries have been loaded, but more history exists.              IMM DTaP/Tdap/Td Vaccine (2 - Td or Tdap) Next due on 3/2/2028      03/02/2018  Imm Admin: Tdap Vaccine              Bone Density Scan (Every 5 Years) Next due on 6/13/2029 06/13/2024  DS-BONE DENSITY STUDY (DEXA)    03/30/2021  DS-BONE DENSITY STUDY (DEXA)              Hepatitis C Screening  Completed      03/18/2014  Hepatitis C Antibody component  of HEPATITIS PANEL ACUTE(4 COMPONENTS)    01/23/2007  Hepatitis C Antibody component of HEP C VIRUS ANTIBODY    03/15/2006  Hepatitis C Antibody component of HEP C VIRUS ANTIBODY              Zoster (Shingles) Vaccines (Series Information) Completed      09/08/2018  Imm Admin: Zoster Vaccine Recombinant (RZV) (SHINGRIX)    05/22/2018  Imm Admin: Zoster Vaccine Recombinant (RZV) (SHINGRIX)              Hepatitis A Vaccine (Hep A) (Series Information) Aged Out      07/16/2019  Imm Admin: Hepatitis A Vaccine, Adult              Pneumococcal Vaccine: 65+ Years (Series Information) Completed      12/13/2023  Outside Immunization: PCV20    03/02/2021  Imm Admin: Pneumococcal polysaccharide vaccine (PPSV-23)    11/24/2014  Imm Admin: Pneumococcal polysaccharide vaccine (PPSV-23)    10/01/2014  Imm Admin: Pneumococcal Vaccine (PCV7) - HISTORICAL DATA              Hepatitis B Vaccine (Hep B) (Series Information) Aged Out      No completion history exists for this topic.              HPV Vaccines (Series Information) Aged Out      No completion history exists for this topic.              Polio Vaccine (Inactivated Polio) (Series Information) Aged Out      No completion history exists for this topic.              Meningococcal Immunization (Series Information) Aged Out      No completion history exists for this topic.                    Patient Care Team:  Cornelius Andrew M.D. as PCP - General (Family Medicine)  Cornelius Andrew M.D. as PCP - HTH Paneled  Jacqueline Arias M.D. as Consulting Physician (OB & Gyn - Gynecology)  Clarence Garcia M.D. as Consulting Physician (Cardiovascular Disease (Cardiology))           Patient Care Team:  Cornelius Andrew M.D. as PCP - General (Family Medicine)  Cornelius Adnrew M.D. as PCP - HTH Paneled  Jacqueline Arias M.D. as Consulting Physician (OB & Gyn - Gynecology)  Clarence Garcia M.D. as Consulting Physician (Cardiovascular Disease (Cardiology))         Social History     Tobacco Use     Smoking status: Never    Smokeless tobacco: Never   Vaping Use    Vaping status: Never Used   Substance Use Topics    Alcohol use: Yes     Alcohol/week: 8.4 oz     Types: 14 Glasses of wine per week     Comment: 1-2 glasses every day    Drug use: Never            Family History   Problem Relation Age of Onset    Cancer Paternal Grandmother         breast            Past Surgical History:   Procedure Laterality Date    GYN SURGERY      hysterectomy    OTHER      tonsillectomy    OTHER ABDOMINAL SURGERY      appy            Whisper test - stand 3 feet  behind patient and whisper 3 numbers for each ear while covering other ear..    - Right 3/3, Left 3/3         Vision test - using Snellen eye chart test both eyes separately and together, with and without correction.     - Without correction:      OD  20/20, OS 20/20, OU 20/20    -  Rise and walk test - Have patient stand, walk 10 feet and return to chair.    -  10 seconds         Stay independent checklist:    Yes (2) No (0) I have fallen in the past year. 0    Yes (2) No (0) I use or have been advised to use a cane or    walker to get around safely.0    Yes (1) No (0) Sometimes I feel unsteady when I am walking. 0    Yes (1) No (0) I steady myself by holding onto furniture    when walking at home.0    Yes (1) No (0) I am worried about falling0    Yes (1) No (0) I need to push with my hands to stand up    from a chair.0    Yes (1) No (0) I have some trouble stepping up onto a curb. 0    Yes (1) No (0) I often have to rush to the toilet. 0    Yes (1) No (0) I have lost some feeling in my feet. 0    Yes (1) No (0) I take medicine that sometimes makes me feel    light-headed or more tired than usual.0    Yes (1) No (0) I take medicine to help me sleep or improve    my mood. 0    Yes (1) No (0) I often feel sad or depressed. 0    Total Add up the number of points for each “yes” answer. 0         EXAM    /60 (BP Location: Left arm, Patient Position: Sitting, BP Cuff  "Size: Large adult)   Pulse 81   Temp 36.3 °C (97.4 °F) (Temporal)   Ht 1.676 m (5' 6\")   Wt 63 kg (139 lb)   SpO2 94%  -          Hearing good.     Dentition good    Alert, oriented in no acute distress.    Eye contact is good, speech goal directed, affect calm        Health maintenance review: (catch them up on all care gaps - vaccines, mammogram, colon cancer screen)    Health Maintenance Summary            Ordered - Colorectal Cancer Screening (Colonoscopy - Every 5 Years) Ordered on 8/27/2024 06/13/2019  REFERRAL TO GI FOR COLONOSCOPY    05/25/2018  REFERRAL TO GI FOR COLONOSCOPY              Overdue - Influenza Vaccine (1) Overdue since 9/1/2024 09/12/2023  Outside Immunization: Influenza Quad Adjuvanted    09/12/2022  Imm Admin: Influenza, Unspecified - HISTORICAL DATA    08/28/2021  Imm Admin: Influenza Vaccine Adult HD    08/29/2020  Imm Admin: Influenza, Unspecified - HISTORICAL DATA    10/02/2019  Imm Admin: Influenza Vaccine Quad Inj (Pf)    Only the first 5 history entries have been loaded, but more history exists.              Overdue - COVID-19 Vaccine (6 - 2023-24 season) Overdue since 9/1/2024 09/20/2023  Outside Immunization: COVID-19(PFR) 12yrs \T\ up    11/18/2022  Imm Admin: MODERNA BIVALENT BOOSTER SARS-COV-2 VACCINE (6+)    02/26/2022  Imm Admin: MODERNA SARS-COV-2 VACCINE (12+)    08/31/2021  Outside Immunization: COVID-19 mRNA (MOD)    01/20/2021  Imm Admin: MODERNA SARS-COV-2 VACCINE (12+)    Only the first 5 history entries have been loaded, but more history exists.              Annual Wellness Visit (Yearly) Next due on 9/13/2025 09/13/2024  Visit Dx: Medicare annual wellness visit, initial    09/13/2024  Level of Service: [place] ANNUAL WELLNESS VISIT-INCLUDES PPPS SUBSEQUE*    07/13/2023  Visit Dx: Medicare annual wellness visit, initial    06/07/2022  Visit Dx: Medicare annual wellness visit, initial    02/16/2021  Visit Dx: Medicare annual wellness visit, initial "    Only the first 5 history entries have been loaded, but more history exists.              Mammogram (Every 2 Years) Tentatively due on 7/24/2026 07/24/2024  MA-SCREENING MAMMO BILAT W/TOMOSYNTHESIS W/CAD    07/10/2023  MA-SCREENING MAMMO BILAT W/TOMOSYNTHESIS W/CAD    07/08/2022  MA-SCREENING MAMMO BILAT W/TOMOSYNTHESIS W/CAD    07/06/2021  MA-SCREENING MAMMO BILAT W/TOMOSYNTHESIS W/CAD    06/25/2020  MA-SCREENING MAMMO BILAT W/TOMOSYNTHESIS W/CAD    Only the first 5 history entries have been loaded, but more history exists.              IMM DTaP/Tdap/Td Vaccine (2 - Td or Tdap) Next due on 3/2/2028      03/02/2018  Imm Admin: Tdap Vaccine              Bone Density Scan (Every 5 Years) Next due on 6/13/2029 06/13/2024  DS-BONE DENSITY STUDY (DEXA)    03/30/2021  DS-BONE DENSITY STUDY (DEXA)              Hepatitis C Screening  Completed      03/18/2014  Hepatitis C Antibody component of HEPATITIS PANEL ACUTE(4 COMPONENTS)    01/23/2007  Hepatitis C Antibody component of HEP C VIRUS ANTIBODY    03/15/2006  Hepatitis C Antibody component of HEP C VIRUS ANTIBODY              Zoster (Shingles) Vaccines (Series Information) Completed      09/08/2018  Imm Admin: Zoster Vaccine Recombinant (RZV) (SHINGRIX)    05/22/2018  Imm Admin: Zoster Vaccine Recombinant (RZV) (SHINGRIX)              Hepatitis A Vaccine (Hep A) (Series Information) Aged Out      07/16/2019  Imm Admin: Hepatitis A Vaccine, Adult              Pneumococcal Vaccine: 65+ Years (Series Information) Completed      12/13/2023  Outside Immunization: PCV20    03/02/2021  Imm Admin: Pneumococcal polysaccharide vaccine (PPSV-23)    11/24/2014  Imm Admin: Pneumococcal polysaccharide vaccine (PPSV-23)    10/01/2014  Imm Admin: Pneumococcal Vaccine (PCV7) - HISTORICAL DATA              Hepatitis B Vaccine (Hep B) (Series Information) Aged Out      No completion history exists for this topic.              HPV Vaccines (Series Information) Aged Out      No  completion history exists for this topic.              Polio Vaccine (Inactivated Polio) (Series Information) Aged Out      No completion history exists for this topic.              Meningococcal Immunization (Series Information) Aged Out      No completion history exists for this topic.                         Medication review:    Current Outpatient Medications   Medication Sig Dispense Refill    ondansetron (ZOFRAN) 4 MG Tab tablet Take 1 Tablet by mouth every four hours as needed for Nausea/Vomiting. 30 Tablet 2    clobetasol (TEMOVATE) 0.05 % Ointment Apply a thin layer to affected areas at  bedtime two times a week 45 g 11    estradiol (ESTRACE) 0.1 MG/GM vaginal cream Apply a thin layer to affected areas 2 x weekly 42.5 g 11    estradiol (VIVELLE) 0.0375 MG/24HR patch Apply 1 patch to clean skin 2 times weekly 24 Patch 3    metoprolol SR (TOPROL XL) 25 MG TABLET SR 24 HR Take 1 Tablet by mouth 2 times a day. 180 Tablet 0    losartan (COZAAR) 100 MG Tab TAKE 1 TABLET BY MOUTH EVERY  Tablet 2    Ascorbic Acid (VITAMIN C CR) 1500 MG Tab CR Take  by mouth.      vitamin D (CHOLECALCIFEROL) 1000 UNIT Tab Take 1,000 Units by mouth every day.      omeprazole (PRILOSEC) 20 MG delayed-release capsule Take 1 Cap by mouth 2 times a day. Indications: **OTC**      multivitamin (THERAGRAN) TABS Take 1 Tab by mouth every day. Indications: **OTC**      docosahexanoic acid (OMEGA 3 FA) 1000 MG CAPS Take 1,000 mg by mouth 2 Times a Day. Indications: **OTC**       No current facility-administered medications for this visit.            If opioid/benzo/sedative on med list discussion of reduction/elimination - referral         At this point document if pt refuses to consider changes off of controlled substance          Vital sign review - unintentional >10 pound weight loss evaluation (BMI and Muscle Mass review)          Whisper test fail - audiology referral          Eye test fail - ophthalmology referral          Stay  independent checklist - PT referral for a score of 4 or more          PHQ evaluation - consider visit for medication change/referral          Mini-cog evaluation - consider visit for further evaluation/referral          POLST on file- if not need to have on hand for patient to fill out - consider Palliative referral etc.         MARGIE screen -         Audit-C Questionnaire         1. How often did you have a drink containing alcohol in the past year?     Never (0 points)  * If you answered Never, score questions 2 and 3 below as zero.      Monthly or less (1 point)     2 to 4 times a month (2 points)     2 or 3 times per week (3 points)     4 or more times a week (4 points)         4         2. How many drinks did you have on a typical day when you were drinking in the past year?     1 - 2 (0 points)     3 - 4 (1point)     5 - 6 (2 points)     7 - 9 (3 points)     10 or more (4 points)         0         3. How often did you have 6 or more drinks on one occasion in the past year?     Never (0 points)     Less than monthly (1 point)     Monthly (2 points)     Weekly (3 points)     Daily or almost daily (4 points)         0         Total: 4         Scorin or more for men and 3 or more for women is an indication for further evaluation for hazardous drinking.         Chronic pain screen:              PEG: A Three-Item Scale Assessing Pain Intensity and Interference    1. What number best describes your pain on average in the past week?     0       1         2              3                     4                          5              6               7             8           9            10    No pain                                                                                                            Pain as bad as you can imagine         2         2. What number best describes how, during the past week, pain has interfered    with your enjoyment of life?    0       1         2              3                      "4                          5              6               7             8           9            10    No pain                                                                                                            Pain as bad as you can imagine         2    3. What number best describes how, during the past week, pain has interfered    with your general activity?     0       1         2              3                     4                          5              6               7             8           9            10    No pain                                                                                                            Pain as bad as you can imagine         1         Scoring: average of 3 scales: 5    Serves as a baseline measurement for chronic pain issues and quality of life and can be used to prompt pain clinic referral.                   Tobacco use - consider visit for discussion if user - discuss alternatives and quitting         Food insecurity screen - (The Hunger Vital Sign)    Within the past 12 months were you worried whether food would run out before you got money to buy more?    Often true          sometimes true          never true    NEVER         Within the past 12 months did the food you bought not last and you did not have money to buy more food?    Often true         sometimes  true          never true    NEVER         Scoring: answering \"often true\" or \"sometimes true\" to either question is a positive screen for food insecurity and should prompt a social work/ referral if needed         Review of Rise and walk test - PT referral for a score over 13 seconds         Clasp arms behind back and head test - have the patient try and clasp hands behind back and then over head, failure to accomplish may indicate shoulder issues- PT/ortho/pmr referral               Assessment and Plan. The following treatment and monitoring plan is recommended:                  Services " suggested: No services needed at this time    Health Care Screening: Age-appropriate preventive services recommended by USPTF and ACIP covered by Medicare were discussed today. Services ordered if indicated and agreed upon by the patient.    Referrals offered: Community-based lifestyle interventions to reduce health risks and promote self-management and wellness, fall prevention, nutrition, physical activity, tobacco-use cessation, weight loss, and mental health services as per orders if indicated.         Discussion today about general wellness and lifestyle habits:                   Prevent falls and reduce trip hazards; Cautioned about securing or removing rugs.                  Have a working fire alarm and carbon monoxide detector;                  Engage in regular physical activity and social activities         1. Medicare annual wellness visit, initial    Completed    2. Neoplasm of uncertain behavior  - Referral to Dermatology    3. Primary hypertension    Patient has been stable with current management  We will make no changes for now    4. Gastroesophageal reflux disease without esophagitis    Patient has been stable with current management  We will make no changes for now    Other orders  - ondansetron (ZOFRAN) 4 MG Tab tablet; Take 1 Tablet by mouth every four hours as needed for Nausea/Vomiting.  Dispense: 30 Tablet; Refill: 2           Follow-up: Return in about 6 months (around 3/13/2025) for Reevaluation, labs.

## 2024-09-14 LAB — APO B100 SERPL-MCNC: 67 MG/DL (ref 60–117)

## 2024-09-17 PROCEDURE — RXMED WILLOW AMBULATORY MEDICATION CHARGE: Performed by: FAMILY MEDICINE

## 2024-09-18 ENCOUNTER — PHARMACY VISIT (OUTPATIENT)
Dept: PHARMACY | Facility: MEDICAL CENTER | Age: 69
End: 2024-09-18
Payer: COMMERCIAL

## 2024-09-23 PROCEDURE — RXMED WILLOW AMBULATORY MEDICATION CHARGE

## 2024-09-25 ENCOUNTER — PHARMACY VISIT (OUTPATIENT)
Dept: PHARMACY | Facility: MEDICAL CENTER | Age: 69
End: 2024-09-25
Payer: COMMERCIAL

## 2024-09-26 DIAGNOSIS — R00.2 PALPITATIONS: ICD-10-CM

## 2024-09-26 DIAGNOSIS — I49.3 SYMPTOMATIC PVCS: ICD-10-CM

## 2024-09-26 PROCEDURE — RXMED WILLOW AMBULATORY MEDICATION CHARGE: Performed by: OBSTETRICS & GYNECOLOGY

## 2024-09-26 PROCEDURE — RXMED WILLOW AMBULATORY MEDICATION CHARGE: Performed by: FAMILY MEDICINE

## 2024-09-26 PROCEDURE — RXMED WILLOW AMBULATORY MEDICATION CHARGE: Performed by: NURSE PRACTITIONER

## 2024-09-26 RX ORDER — METOPROLOL SUCCINATE 25 MG/1
25 TABLET, EXTENDED RELEASE ORAL 2 TIMES DAILY
Qty: 180 TABLET | Refills: 0 | Status: SHIPPED | OUTPATIENT
Start: 2024-09-26

## 2024-09-27 ENCOUNTER — PHARMACY VISIT (OUTPATIENT)
Dept: PHARMACY | Facility: MEDICAL CENTER | Age: 69
End: 2024-09-27
Payer: COMMERCIAL

## 2024-09-30 ENCOUNTER — OFFICE VISIT (OUTPATIENT)
Dept: MEDICAL GROUP | Facility: CLINIC | Age: 69
End: 2024-09-30
Payer: MEDICARE

## 2024-09-30 VITALS
HEIGHT: 66 IN | HEART RATE: 82 BPM | BODY MASS INDEX: 22.5 KG/M2 | TEMPERATURE: 97.8 F | DIASTOLIC BLOOD PRESSURE: 76 MMHG | WEIGHT: 140 LBS | SYSTOLIC BLOOD PRESSURE: 116 MMHG | OXYGEN SATURATION: 94 %

## 2024-09-30 DIAGNOSIS — Z71.84 TRAVEL ADVICE ENCOUNTER: ICD-10-CM

## 2024-09-30 PROCEDURE — 90471 IMMUNIZATION ADMIN: CPT | Performed by: FAMILY MEDICINE

## 2024-09-30 PROCEDURE — 90691 TYPHOID VACCINE IM: CPT | Performed by: FAMILY MEDICINE

## 2024-09-30 PROCEDURE — 99215 OFFICE O/P EST HI 40 MIN: CPT | Mod: 25 | Performed by: FAMILY MEDICINE

## 2024-09-30 RX ORDER — ATOVAQUONE AND PROGUANIL HYDROCHLORIDE 250; 100 MG/1; MG/1
TABLET, FILM COATED ORAL
Qty: 12 TABLET | Refills: 0 | Status: SHIPPED | OUTPATIENT
Start: 2024-09-30

## 2024-09-30 RX ORDER — ACETAZOLAMIDE 125 MG/1
TABLET ORAL
Qty: 30 TABLET | Refills: 0 | Status: SHIPPED | OUTPATIENT
Start: 2024-09-30

## 2024-09-30 ASSESSMENT — FIBROSIS 4 INDEX: FIB4 SCORE: 1.64

## 2024-10-01 ENCOUNTER — APPOINTMENT (OUTPATIENT)
Dept: DERMATOLOGY | Facility: IMAGING CENTER | Age: 69
End: 2024-10-01
Payer: MEDICARE

## 2024-10-01 DIAGNOSIS — D48.9 NEOPLASM OF UNCERTAIN BEHAVIOR: ICD-10-CM

## 2024-10-01 PROCEDURE — 11102 TANGNTL BX SKIN SINGLE LES: CPT | Performed by: NURSE PRACTITIONER

## 2024-10-04 ENCOUNTER — PHARMACY VISIT (OUTPATIENT)
Dept: PHARMACY | Facility: MEDICAL CENTER | Age: 69
End: 2024-10-04
Payer: COMMERCIAL

## 2024-10-04 PROCEDURE — RXMED WILLOW AMBULATORY MEDICATION CHARGE: Performed by: FAMILY MEDICINE

## 2024-10-05 PROCEDURE — RXMED WILLOW AMBULATORY MEDICATION CHARGE: Performed by: OBSTETRICS & GYNECOLOGY

## 2024-10-08 ENCOUNTER — PHARMACY VISIT (OUTPATIENT)
Dept: PHARMACY | Facility: MEDICAL CENTER | Age: 69
End: 2024-10-08
Payer: COMMERCIAL

## 2024-10-08 ENCOUNTER — TELEPHONE (OUTPATIENT)
Dept: DERMATOLOGY | Facility: IMAGING CENTER | Age: 69
End: 2024-10-08
Payer: MEDICARE

## 2024-10-08 DIAGNOSIS — C44.311 BASAL CELL CARCINOMA (BCC) OF LATERAL SIDE WALL OF NOSE: ICD-10-CM

## 2024-10-09 PROBLEM — Z71.84 TRAVEL ADVICE ENCOUNTER: Status: ACTIVE | Noted: 2024-10-09

## 2024-10-09 ASSESSMENT — ENCOUNTER SYMPTOMS
CARDIOVASCULAR NEGATIVE: 1
PSYCHIATRIC NEGATIVE: 1
NEUROLOGICAL NEGATIVE: 1
CONSTITUTIONAL NEGATIVE: 1
GASTROINTESTINAL NEGATIVE: 1
EYES NEGATIVE: 1
RESPIRATORY NEGATIVE: 1

## 2024-10-13 DIAGNOSIS — I49.3 SYMPTOMATIC PVCS: ICD-10-CM

## 2024-10-13 DIAGNOSIS — R00.2 PALPITATIONS: ICD-10-CM

## 2024-10-14 ENCOUNTER — PHARMACY VISIT (OUTPATIENT)
Dept: PHARMACY | Facility: MEDICAL CENTER | Age: 69
End: 2024-10-14
Payer: COMMERCIAL

## 2024-10-14 PROCEDURE — RXMED WILLOW AMBULATORY MEDICATION CHARGE: Performed by: FAMILY MEDICINE

## 2024-10-14 PROCEDURE — RXMED WILLOW AMBULATORY MEDICATION CHARGE

## 2024-10-14 RX ORDER — METOPROLOL SUCCINATE 25 MG/1
25 TABLET, EXTENDED RELEASE ORAL 2 TIMES DAILY
Qty: 180 TABLET | Refills: 0 | OUTPATIENT
Start: 2024-10-14

## 2024-10-15 ENCOUNTER — PHARMACY VISIT (OUTPATIENT)
Dept: PHARMACY | Facility: MEDICAL CENTER | Age: 69
End: 2024-10-15
Payer: COMMERCIAL

## 2024-10-24 PROCEDURE — RXMED WILLOW AMBULATORY MEDICATION CHARGE

## 2024-11-01 ENCOUNTER — PHARMACY VISIT (OUTPATIENT)
Dept: PHARMACY | Facility: MEDICAL CENTER | Age: 69
End: 2024-11-01
Payer: COMMERCIAL

## 2024-11-25 ENCOUNTER — APPOINTMENT (RX ONLY)
Dept: URBAN - METROPOLITAN AREA CLINIC 36 | Facility: CLINIC | Age: 69
Setting detail: DERMATOLOGY
End: 2024-11-25

## 2024-11-25 PROBLEM — C44.91 BASAL CELL CARCINOMA OF SKIN, UNSPECIFIED: Status: ACTIVE | Noted: 2024-11-25

## 2024-11-25 PROCEDURE — 11900 INJECT SKIN LESIONS </W 7: CPT

## 2024-11-25 PROCEDURE — ? REFERRAL CORRESPONDENCE

## 2024-11-25 PROCEDURE — ? INJECTION

## 2024-11-25 NOTE — PROCEDURE: INJECTION
Hide Second Medication?: No
Route: IL
Consent: The risks of the medication was reviewed with the patient.
Treatment Number: 1
Dose Administered (Numbers Only - Mg, G, Mcg, Units, Cc): 0
Procedure Information: Please note that the numeric value listed in the Medication (1) and associated J-code units and Medication (2) and associated J-code units variables are j-code amounts and do not represent either the concentration or the total amount of the medications injected.  I strongly recommend selecting no to the Render J-code information in note question. This will allow your note to be more clear. If you are billing j-codes with your injection codes you need to document the total amount of the medication injected. This amount should match the j-code units. For example, if you are injecting Triamcinolone 40mg as an intramuscular injection you would select 40 for the dose field.. This would allow you to document  with 4 units (40mg = 10mg x 4). The total volume is not used to calculate j-codes only the amount of the medication administered.
Type Of Vial Used?: Single Dose
Render J-Code Information In Note?: yes
Post-Care Instructions: I reviewed with the patient in detail post-care instructions. Patient understands to keep the injection sites clean and call the clinic if there is any redness, swelling or pain.
Medication (1) And Associated J-Code Units: Bleomycin, 15 units
Detail Level: None
Type Of Vial Used?: Multi-Dose
Dose Administered (Numbers Only - Mg, G, Mcg, Units, Cc): 0.2

## 2024-12-14 DIAGNOSIS — I49.3 SYMPTOMATIC PVCS: ICD-10-CM

## 2024-12-14 DIAGNOSIS — R00.2 PALPITATIONS: ICD-10-CM

## 2024-12-14 PROCEDURE — RXMED WILLOW AMBULATORY MEDICATION CHARGE: Performed by: NURSE PRACTITIONER

## 2024-12-14 PROCEDURE — RXMED WILLOW AMBULATORY MEDICATION CHARGE: Performed by: OBSTETRICS & GYNECOLOGY

## 2024-12-16 ENCOUNTER — APPOINTMENT (OUTPATIENT)
Dept: URBAN - METROPOLITAN AREA CLINIC 36 | Facility: CLINIC | Age: 69
Setting detail: DERMATOLOGY
End: 2024-12-16

## 2024-12-16 PROBLEM — C44.91 BASAL CELL CARCINOMA OF SKIN, UNSPECIFIED: Status: ACTIVE | Noted: 2024-12-16

## 2024-12-16 PROCEDURE — ? REFERRAL CORRESPONDENCE

## 2024-12-16 PROCEDURE — ? INJECTION

## 2024-12-16 PROCEDURE — 11900 INJECT SKIN LESIONS </W 7: CPT

## 2024-12-16 RX ORDER — METOPROLOL SUCCINATE 25 MG/1
25 TABLET, EXTENDED RELEASE ORAL 2 TIMES DAILY
Qty: 180 TABLET | Refills: 0 | Status: SHIPPED | OUTPATIENT
Start: 2024-12-16

## 2024-12-16 NOTE — PROCEDURE: INJECTION
Hide Second Medication?: No
Route: IL
Consent: The risks of the medication was reviewed with the patient.
Treatment Number: 2
Dose Administered (Numbers Only - Mg, G, Mcg, Units, Cc): 0
Procedure Information: Please note that the numeric value listed in the Medication (1) and associated J-code units and Medication (2) and associated J-code units variables are j-code amounts and do not represent either the concentration or the total amount of the medications injected.  I strongly recommend selecting no to the Render J-code information in note question. This will allow your note to be more clear. If you are billing j-codes with your injection codes you need to document the total amount of the medication injected. This amount should match the j-code units. For example, if you are injecting Triamcinolone 40mg as an intramuscular injection you would select 40 for the dose field.. This would allow you to document  with 4 units (40mg = 10mg x 4). The total volume is not used to calculate j-codes only the amount of the medication administered.
Type Of Vial Used?: Single Dose
Render J-Code Information In Note?: yes
Post-Care Instructions: I reviewed with the patient in detail post-care instructions. Patient understands to keep the injection sites clean and call the clinic if there is any redness, swelling or pain.
Medication (1) And Associated J-Code Units: Bleomycin, 15 units
Detail Level: None
Type Of Vial Used?: Multi-Dose
Dose Administered (Numbers Only - Mg, G, Mcg, Units, Cc): 0.2

## 2024-12-17 PROCEDURE — RXMED WILLOW AMBULATORY MEDICATION CHARGE: Performed by: FAMILY MEDICINE

## 2024-12-18 ENCOUNTER — PHARMACY VISIT (OUTPATIENT)
Dept: PHARMACY | Facility: MEDICAL CENTER | Age: 69
End: 2024-12-18
Payer: COMMERCIAL

## 2024-12-22 PROCEDURE — RXMED WILLOW AMBULATORY MEDICATION CHARGE: Performed by: OBSTETRICS & GYNECOLOGY

## 2024-12-22 PROCEDURE — RXMED WILLOW AMBULATORY MEDICATION CHARGE

## 2024-12-26 ENCOUNTER — PHARMACY VISIT (OUTPATIENT)
Dept: PHARMACY | Facility: MEDICAL CENTER | Age: 69
End: 2024-12-26
Payer: COMMERCIAL

## 2025-01-12 PROCEDURE — RXMED WILLOW AMBULATORY MEDICATION CHARGE: Performed by: FAMILY MEDICINE

## 2025-01-12 PROCEDURE — RXMED WILLOW AMBULATORY MEDICATION CHARGE: Performed by: OBSTETRICS & GYNECOLOGY

## 2025-01-12 PROCEDURE — RXMED WILLOW AMBULATORY MEDICATION CHARGE

## 2025-01-15 ENCOUNTER — PHARMACY VISIT (OUTPATIENT)
Dept: PHARMACY | Facility: MEDICAL CENTER | Age: 70
End: 2025-01-15
Payer: COMMERCIAL

## 2025-03-03 ENCOUNTER — APPOINTMENT (OUTPATIENT)
Dept: URBAN - METROPOLITAN AREA CLINIC 36 | Facility: CLINIC | Age: 70
Setting detail: DERMATOLOGY
End: 2025-03-03

## 2025-03-03 PROBLEM — C44.1192 BASAL CELL CARCINOMA OF SKIN OF LEFT LOWER EYELID, INCLUDING CANTHUS: Status: ACTIVE | Noted: 2025-03-03

## 2025-03-03 PROCEDURE — ? OBSERVATION

## 2025-03-03 PROCEDURE — 99212 OFFICE O/P EST SF 10 MIN: CPT

## 2025-03-03 PROCEDURE — ? REFERRAL CORRESPONDENCE

## 2025-03-27 ENCOUNTER — PATIENT MESSAGE (OUTPATIENT)
Dept: MEDICAL GROUP | Age: 70
End: 2025-03-27
Payer: MEDICARE

## 2025-03-27 DIAGNOSIS — I49.3 SYMPTOMATIC PVCS: ICD-10-CM

## 2025-03-27 DIAGNOSIS — I10 PRIMARY HYPERTENSION: ICD-10-CM

## 2025-03-27 DIAGNOSIS — R00.2 PALPITATIONS: ICD-10-CM

## 2025-03-27 PROCEDURE — RXMED WILLOW AMBULATORY MEDICATION CHARGE: Performed by: OBSTETRICS & GYNECOLOGY

## 2025-03-27 PROCEDURE — RXMED WILLOW AMBULATORY MEDICATION CHARGE: Performed by: FAMILY MEDICINE

## 2025-03-27 PROCEDURE — RXMED WILLOW AMBULATORY MEDICATION CHARGE

## 2025-03-27 RX ORDER — METOPROLOL SUCCINATE 25 MG/1
25 TABLET, EXTENDED RELEASE ORAL 2 TIMES DAILY
Qty: 180 TABLET | Refills: 0 | Status: SHIPPED | OUTPATIENT
Start: 2025-03-27

## 2025-03-27 RX ORDER — LOSARTAN POTASSIUM 100 MG/1
100 TABLET ORAL DAILY
Qty: 100 TABLET | Refills: 2 | OUTPATIENT
Start: 2025-03-27

## 2025-03-27 NOTE — TELEPHONE ENCOUNTER
Is the patient due for a refill? Yes    Was the patient seen the last 15 months? Yes    Date of last office visit: 3/25/2024    Does the patient have an upcoming appointment?  No      Provider to refill:MT    Does the patient have [facility] and need 100-day supply? (This applies to ALL medications) Yes, quantity updated to 100 days

## 2025-03-28 PROCEDURE — RXMED WILLOW AMBULATORY MEDICATION CHARGE: Performed by: FAMILY MEDICINE

## 2025-03-31 ENCOUNTER — PHARMACY VISIT (OUTPATIENT)
Dept: PHARMACY | Facility: MEDICAL CENTER | Age: 70
End: 2025-03-31
Payer: COMMERCIAL

## 2025-04-08 ENCOUNTER — HOSPITAL ENCOUNTER (OUTPATIENT)
Dept: LAB | Facility: MEDICAL CENTER | Age: 70
End: 2025-04-08
Attending: FAMILY MEDICINE
Payer: MEDICARE

## 2025-04-08 DIAGNOSIS — E78.00 PURE HYPERCHOLESTEROLEMIA: ICD-10-CM

## 2025-04-08 DIAGNOSIS — E55.9 VITAMIN D DEFICIENCY: ICD-10-CM

## 2025-04-08 LAB
25(OH)D3 SERPL-MCNC: 102 NG/ML (ref 30–100)
ALBUMIN SERPL BCP-MCNC: 4.6 G/DL (ref 3.2–4.9)
ALBUMIN/GLOB SERPL: 1.9 G/DL
ALP SERPL-CCNC: 76 U/L (ref 30–99)
ALT SERPL-CCNC: 39 U/L (ref 2–50)
ANION GAP SERPL CALC-SCNC: 12 MMOL/L (ref 7–16)
AST SERPL-CCNC: 46 U/L (ref 12–45)
BASOPHILS # BLD AUTO: 1.1 % (ref 0–1.8)
BASOPHILS # BLD: 0.06 K/UL (ref 0–0.12)
BILIRUB SERPL-MCNC: 0.8 MG/DL (ref 0.1–1.5)
BUN SERPL-MCNC: 17 MG/DL (ref 8–22)
CALCIUM ALBUM COR SERPL-MCNC: 9.3 MG/DL (ref 8.5–10.5)
CALCIUM SERPL-MCNC: 9.8 MG/DL (ref 8.5–10.5)
CHLORIDE SERPL-SCNC: 106 MMOL/L (ref 96–112)
CHOLEST SERPL-MCNC: 208 MG/DL (ref 100–199)
CO2 SERPL-SCNC: 23 MMOL/L (ref 20–33)
CREAT SERPL-MCNC: 0.73 MG/DL (ref 0.5–1.4)
EOSINOPHIL # BLD AUTO: 0.07 K/UL (ref 0–0.51)
EOSINOPHIL NFR BLD: 1.3 % (ref 0–6.9)
ERYTHROCYTE [DISTWIDTH] IN BLOOD BY AUTOMATED COUNT: 46.9 FL (ref 35.9–50)
GFR SERPLBLD CREATININE-BSD FMLA CKD-EPI: 89 ML/MIN/1.73 M 2
GLOBULIN SER CALC-MCNC: 2.4 G/DL (ref 1.9–3.5)
GLUCOSE SERPL-MCNC: 84 MG/DL (ref 65–99)
HCT VFR BLD AUTO: 44.9 % (ref 37–47)
HDLC SERPL-MCNC: 130 MG/DL
HGB BLD-MCNC: 14.8 G/DL (ref 12–16)
IMM GRANULOCYTES # BLD AUTO: 0.01 K/UL (ref 0–0.11)
IMM GRANULOCYTES NFR BLD AUTO: 0.2 % (ref 0–0.9)
LDLC SERPL CALC-MCNC: 73 MG/DL
LYMPHOCYTES # BLD AUTO: 1.62 K/UL (ref 1–4.8)
LYMPHOCYTES NFR BLD: 30.2 % (ref 22–41)
MCH RBC QN AUTO: 31.8 PG (ref 27–33)
MCHC RBC AUTO-ENTMCNC: 33 G/DL (ref 32.2–35.5)
MCV RBC AUTO: 96.6 FL (ref 81.4–97.8)
MONOCYTES # BLD AUTO: 0.67 K/UL (ref 0–0.85)
MONOCYTES NFR BLD AUTO: 12.5 % (ref 0–13.4)
NEUTROPHILS # BLD AUTO: 2.94 K/UL (ref 1.82–7.42)
NEUTROPHILS NFR BLD: 54.7 % (ref 44–72)
NRBC # BLD AUTO: 0 K/UL
NRBC BLD-RTO: 0 /100 WBC (ref 0–0.2)
PLATELET # BLD AUTO: 250 K/UL (ref 164–446)
PMV BLD AUTO: 9 FL (ref 9–12.9)
POTASSIUM SERPL-SCNC: 4.7 MMOL/L (ref 3.6–5.5)
PROT SERPL-MCNC: 7 G/DL (ref 6–8.2)
RBC # BLD AUTO: 4.65 M/UL (ref 4.2–5.4)
SODIUM SERPL-SCNC: 141 MMOL/L (ref 135–145)
T3FREE SERPL-MCNC: 2.54 PG/ML (ref 2–4.4)
T4 FREE SERPL-MCNC: 0.91 NG/DL (ref 0.93–1.7)
TRIGL SERPL-MCNC: 26 MG/DL (ref 0–149)
TSH SERPL-ACNC: 0.97 UIU/ML (ref 0.38–5.33)
WBC # BLD AUTO: 5.4 K/UL (ref 4.8–10.8)

## 2025-04-08 PROCEDURE — 85025 COMPLETE CBC W/AUTO DIFF WBC: CPT

## 2025-04-08 PROCEDURE — 84443 ASSAY THYROID STIM HORMONE: CPT

## 2025-04-08 PROCEDURE — 36415 COLL VENOUS BLD VENIPUNCTURE: CPT

## 2025-04-08 PROCEDURE — 80053 COMPREHEN METABOLIC PANEL: CPT

## 2025-04-08 PROCEDURE — 80061 LIPID PANEL: CPT

## 2025-04-08 PROCEDURE — 82306 VITAMIN D 25 HYDROXY: CPT

## 2025-04-08 PROCEDURE — 84481 FREE ASSAY (FT-3): CPT

## 2025-04-08 PROCEDURE — 84439 ASSAY OF FREE THYROXINE: CPT

## 2025-04-09 ENCOUNTER — RESULTS FOLLOW-UP (OUTPATIENT)
Dept: MEDICAL GROUP | Age: 70
End: 2025-04-09

## 2025-04-09 ENCOUNTER — APPOINTMENT (OUTPATIENT)
Dept: ADMISSIONS | Facility: MEDICAL CENTER | Age: 70
End: 2025-04-09
Attending: NEUROLOGICAL SURGERY
Payer: MEDICARE

## 2025-04-10 ENCOUNTER — HOSPITAL ENCOUNTER (OUTPATIENT)
Dept: RADIOLOGY | Facility: MEDICAL CENTER | Age: 70
End: 2025-04-10
Attending: NEUROLOGICAL SURGERY
Payer: MEDICARE

## 2025-04-10 ENCOUNTER — PRE-ADMISSION TESTING (OUTPATIENT)
Dept: ADMISSIONS | Facility: MEDICAL CENTER | Age: 70
End: 2025-04-10
Payer: MEDICARE

## 2025-04-10 DIAGNOSIS — Z01.810 PRE-OPERATIVE CARDIOVASCULAR EXAMINATION: ICD-10-CM

## 2025-04-10 DIAGNOSIS — Z01.811 PRE-OPERATIVE RESPIRATORY EXAMINATION: ICD-10-CM

## 2025-04-10 DIAGNOSIS — Z01.812 PRE-OPERATIVE LABORATORY EXAMINATION: ICD-10-CM

## 2025-04-10 LAB
APTT PPP: 27.8 SEC (ref 24.7–36)
EKG IMPRESSION: NORMAL
INR PPP: 1 (ref 0.87–1.13)
PROTHROMBIN TIME: 13.2 SEC (ref 12–14.6)

## 2025-04-10 PROCEDURE — 71046 X-RAY EXAM CHEST 2 VIEWS: CPT

## 2025-04-10 PROCEDURE — 93010 ELECTROCARDIOGRAM REPORT: CPT | Performed by: STUDENT IN AN ORGANIZED HEALTH CARE EDUCATION/TRAINING PROGRAM

## 2025-04-10 PROCEDURE — 85730 THROMBOPLASTIN TIME PARTIAL: CPT

## 2025-04-10 PROCEDURE — 36415 COLL VENOUS BLD VENIPUNCTURE: CPT

## 2025-04-10 PROCEDURE — 93005 ELECTROCARDIOGRAM TRACING: CPT | Mod: TC

## 2025-04-10 PROCEDURE — 85610 PROTHROMBIN TIME: CPT

## 2025-04-10 RX ORDER — GABAPENTIN 300 MG/1
300 CAPSULE ORAL NIGHTLY
Status: ON HOLD | COMMUNITY
End: 2025-04-17

## 2025-04-10 NOTE — PREADMIT AVS NOTE
Current Medications   Medication Instructions    gabapentin (NEURONTIN) 300 MG Cap Continue to take as prescribed    metoprolol SR (TOPROL XL) 25 MG TABLET SR 24 HR Continue taking medication as prescribed, including morning of procedure     HYDROcodone-acetaminophen (NORCO) 5-325 MG Tab per tablet As needed medication, may take if needed, including morning of procedure     tizanidine (ZANAFLEX) 4 MG Tab As needed medication, may take if needed, including morning of procedure     ondansetron (ZOFRAN) 4 MG Tab tablet As needed medication, may take if needed, including morning of procedure     clobetasol (TEMOVATE) 0.05 % Ointment Hold medication day of procedure    estradiol (ESTRACE) 0.1 MG/GM vaginal cream Hold medication day of procedure    estradiol (VIVELLE) 0.0375 MG/24HR patch Hold medication day of procedure    losartan (COZAAR) 100 MG Tab Stop 24 hours before surgery    Ascorbic Acid (VITAMIN C CR) 1500 MG Tab CR Stop 7 days before surgery    vitamin D (CHOLECALCIFEROL) 1000 UNIT Tab Stop 7 days before surgery    omeprazole (PRILOSEC) 20 MG delayed-release capsule Continue taking medication as prescribed, including morning of procedure     multivitamin (THERAGRAN) TABS Stop 7 days before surgery    docosahexanoic acid (OMEGA 3 FA) 1000 MG CAPS Stop 7 days before surgery

## 2025-04-16 ENCOUNTER — ANESTHESIA EVENT (OUTPATIENT)
Dept: SURGERY | Facility: MEDICAL CENTER | Age: 70
End: 2025-04-16
Payer: MEDICARE

## 2025-04-17 ENCOUNTER — APPOINTMENT (OUTPATIENT)
Dept: RADIOLOGY | Facility: MEDICAL CENTER | Age: 70
End: 2025-04-17
Attending: NEUROLOGICAL SURGERY
Payer: MEDICARE

## 2025-04-17 ENCOUNTER — HOSPITAL ENCOUNTER (OUTPATIENT)
Facility: MEDICAL CENTER | Age: 70
End: 2025-04-17
Attending: NEUROLOGICAL SURGERY | Admitting: NEUROLOGICAL SURGERY
Payer: MEDICARE

## 2025-04-17 ENCOUNTER — ANESTHESIA (OUTPATIENT)
Dept: SURGERY | Facility: MEDICAL CENTER | Age: 70
End: 2025-04-17
Payer: MEDICARE

## 2025-04-17 ENCOUNTER — PHARMACY VISIT (OUTPATIENT)
Dept: PHARMACY | Facility: MEDICAL CENTER | Age: 70
End: 2025-04-17
Payer: COMMERCIAL

## 2025-04-17 VITALS
HEART RATE: 76 BPM | WEIGHT: 151.68 LBS | HEIGHT: 67 IN | TEMPERATURE: 97.4 F | DIASTOLIC BLOOD PRESSURE: 75 MMHG | RESPIRATION RATE: 14 BRPM | BODY MASS INDEX: 23.81 KG/M2 | OXYGEN SATURATION: 94 % | SYSTOLIC BLOOD PRESSURE: 117 MMHG

## 2025-04-17 PROBLEM — R11.2 PONV (POSTOPERATIVE NAUSEA AND VOMITING): Status: ACTIVE | Noted: 2025-04-17

## 2025-04-17 PROBLEM — Z98.890 PONV (POSTOPERATIVE NAUSEA AND VOMITING): Status: ACTIVE | Noted: 2025-04-17

## 2025-04-17 PROCEDURE — 160041 HCHG SURGERY MINUTES - EA ADDL 1 MIN LEVEL 4: Performed by: NEUROLOGICAL SURGERY

## 2025-04-17 PROCEDURE — 160025 RECOVERY II MINUTES (STATS): Performed by: NEUROLOGICAL SURGERY

## 2025-04-17 PROCEDURE — 160047 HCHG PACU  - EA ADDL 30 MINS PHASE II: Performed by: NEUROLOGICAL SURGERY

## 2025-04-17 PROCEDURE — 160029 HCHG SURGERY MINUTES - 1ST 30 MINS LEVEL 4: Performed by: NEUROLOGICAL SURGERY

## 2025-04-17 PROCEDURE — 160002 HCHG RECOVERY MINUTES (STAT): Performed by: NEUROLOGICAL SURGERY

## 2025-04-17 PROCEDURE — 700101 HCHG RX REV CODE 250: Performed by: NEUROLOGICAL SURGERY

## 2025-04-17 PROCEDURE — 700105 HCHG RX REV CODE 258: Performed by: ANESTHESIOLOGY

## 2025-04-17 PROCEDURE — 160009 HCHG ANES TIME/MIN: Performed by: NEUROLOGICAL SURGERY

## 2025-04-17 PROCEDURE — 110371 HCHG SHELL REV 272: Performed by: NEUROLOGICAL SURGERY

## 2025-04-17 PROCEDURE — 700111 HCHG RX REV CODE 636 W/ 250 OVERRIDE (IP): Mod: JZ | Performed by: ANESTHESIOLOGY

## 2025-04-17 PROCEDURE — RXMED WILLOW AMBULATORY MEDICATION CHARGE: Performed by: NURSE PRACTITIONER

## 2025-04-17 PROCEDURE — 700102 HCHG RX REV CODE 250 W/ 637 OVERRIDE(OP): Performed by: ANESTHESIOLOGY

## 2025-04-17 PROCEDURE — 700111 HCHG RX REV CODE 636 W/ 250 OVERRIDE (IP): Performed by: ANESTHESIOLOGY

## 2025-04-17 PROCEDURE — 72020 X-RAY EXAM OF SPINE 1 VIEW: CPT

## 2025-04-17 PROCEDURE — 160048 HCHG OR STATISTICAL LEVEL 1-5: Performed by: NEUROLOGICAL SURGERY

## 2025-04-17 PROCEDURE — 700105 HCHG RX REV CODE 258: Performed by: NEUROLOGICAL SURGERY

## 2025-04-17 PROCEDURE — A9270 NON-COVERED ITEM OR SERVICE: HCPCS | Performed by: ANESTHESIOLOGY

## 2025-04-17 PROCEDURE — 160046 HCHG PACU - 1ST 60 MINS PHASE II: Performed by: NEUROLOGICAL SURGERY

## 2025-04-17 PROCEDURE — 700101 HCHG RX REV CODE 250: Performed by: ANESTHESIOLOGY

## 2025-04-17 PROCEDURE — 160015 HCHG STAT PREOP MINUTES: Performed by: NEUROLOGICAL SURGERY

## 2025-04-17 PROCEDURE — 110454 HCHG SHELL REV 250: Performed by: NEUROLOGICAL SURGERY

## 2025-04-17 PROCEDURE — 700111 HCHG RX REV CODE 636 W/ 250 OVERRIDE (IP): Performed by: NEUROLOGICAL SURGERY

## 2025-04-17 PROCEDURE — 160035 HCHG PACU - 1ST 60 MINS PHASE I: Performed by: NEUROLOGICAL SURGERY

## 2025-04-17 RX ORDER — OXYCODONE AND ACETAMINOPHEN 5; 325 MG/1; MG/1
1-2 TABLET ORAL EVERY 6 HOURS PRN
Qty: 56 TABLET | Refills: 0 | OUTPATIENT
Start: 2025-04-17

## 2025-04-17 RX ORDER — HYDRALAZINE HYDROCHLORIDE 20 MG/ML
5 INJECTION INTRAMUSCULAR; INTRAVENOUS
Status: DISCONTINUED | OUTPATIENT
Start: 2025-04-17 | End: 2025-04-17 | Stop reason: HOSPADM

## 2025-04-17 RX ORDER — HYDROMORPHONE HYDROCHLORIDE 1 MG/ML
0.2 INJECTION, SOLUTION INTRAMUSCULAR; INTRAVENOUS; SUBCUTANEOUS
Status: DISCONTINUED | OUTPATIENT
Start: 2025-04-17 | End: 2025-04-17 | Stop reason: HOSPADM

## 2025-04-17 RX ORDER — MAGNESIUM SULFATE HEPTAHYDRATE 40 MG/ML
INJECTION, SOLUTION INTRAVENOUS PRN
Status: DISCONTINUED | OUTPATIENT
Start: 2025-04-17 | End: 2025-04-17 | Stop reason: SURG

## 2025-04-17 RX ORDER — SODIUM CHLORIDE, SODIUM LACTATE, POTASSIUM CHLORIDE, CALCIUM CHLORIDE 600; 310; 30; 20 MG/100ML; MG/100ML; MG/100ML; MG/100ML
INJECTION, SOLUTION INTRAVENOUS CONTINUOUS
Status: ACTIVE | OUTPATIENT
Start: 2025-04-17 | End: 2025-04-17

## 2025-04-17 RX ORDER — ALBUTEROL SULFATE 5 MG/ML
2.5 SOLUTION RESPIRATORY (INHALATION)
Status: DISCONTINUED | OUTPATIENT
Start: 2025-04-17 | End: 2025-04-17 | Stop reason: HOSPADM

## 2025-04-17 RX ORDER — FLUTICASONE PROPIONATE 50 MCG
1 SPRAY, SUSPENSION (ML) NASAL DAILY
COMMUNITY

## 2025-04-17 RX ORDER — SODIUM CHLORIDE, SODIUM LACTATE, POTASSIUM CHLORIDE, CALCIUM CHLORIDE 600; 310; 30; 20 MG/100ML; MG/100ML; MG/100ML; MG/100ML
INJECTION, SOLUTION INTRAVENOUS CONTINUOUS
Status: DISCONTINUED | OUTPATIENT
Start: 2025-04-17 | End: 2025-04-17 | Stop reason: HOSPADM

## 2025-04-17 RX ORDER — HYDROMORPHONE HYDROCHLORIDE 1 MG/ML
0.1 INJECTION, SOLUTION INTRAMUSCULAR; INTRAVENOUS; SUBCUTANEOUS
Status: DISCONTINUED | OUTPATIENT
Start: 2025-04-17 | End: 2025-04-17 | Stop reason: HOSPADM

## 2025-04-17 RX ORDER — HYDROMORPHONE HYDROCHLORIDE 2 MG/ML
INJECTION, SOLUTION INTRAMUSCULAR; INTRAVENOUS; SUBCUTANEOUS PRN
Status: DISCONTINUED | OUTPATIENT
Start: 2025-04-17 | End: 2025-04-17 | Stop reason: SURG

## 2025-04-17 RX ORDER — ONDANSETRON 2 MG/ML
INJECTION INTRAMUSCULAR; INTRAVENOUS PRN
Status: DISCONTINUED | OUTPATIENT
Start: 2025-04-17 | End: 2025-04-17 | Stop reason: SURG

## 2025-04-17 RX ORDER — OXYCODONE HCL 5 MG/5 ML
5 SOLUTION, ORAL ORAL
Status: COMPLETED | OUTPATIENT
Start: 2025-04-17 | End: 2025-04-17

## 2025-04-17 RX ORDER — HYDROMORPHONE HYDROCHLORIDE 1 MG/ML
0.4 INJECTION, SOLUTION INTRAMUSCULAR; INTRAVENOUS; SUBCUTANEOUS
Status: DISCONTINUED | OUTPATIENT
Start: 2025-04-17 | End: 2025-04-17 | Stop reason: HOSPADM

## 2025-04-17 RX ORDER — DIPHENHYDRAMINE HYDROCHLORIDE 50 MG/ML
12.5 INJECTION, SOLUTION INTRAMUSCULAR; INTRAVENOUS
Status: DISCONTINUED | OUTPATIENT
Start: 2025-04-17 | End: 2025-04-17 | Stop reason: HOSPADM

## 2025-04-17 RX ORDER — EPHEDRINE SULFATE 50 MG/ML
5 INJECTION, SOLUTION INTRAVENOUS
Status: DISCONTINUED | OUTPATIENT
Start: 2025-04-17 | End: 2025-04-17 | Stop reason: HOSPADM

## 2025-04-17 RX ORDER — METOCLOPRAMIDE HYDROCHLORIDE 5 MG/ML
INJECTION INTRAMUSCULAR; INTRAVENOUS PRN
Status: DISCONTINUED | OUTPATIENT
Start: 2025-04-17 | End: 2025-04-17 | Stop reason: SURG

## 2025-04-17 RX ORDER — LIDOCAINE HYDROCHLORIDE 20 MG/ML
INJECTION, SOLUTION EPIDURAL; INFILTRATION; INTRACAUDAL; PERINEURAL PRN
Status: DISCONTINUED | OUTPATIENT
Start: 2025-04-17 | End: 2025-04-17 | Stop reason: SURG

## 2025-04-17 RX ORDER — OXYCODONE HCL 5 MG/5 ML
10 SOLUTION, ORAL ORAL
Status: COMPLETED | OUTPATIENT
Start: 2025-04-17 | End: 2025-04-17

## 2025-04-17 RX ORDER — ACETAMINOPHEN 500 MG
1000 TABLET ORAL ONCE
Status: COMPLETED | OUTPATIENT
Start: 2025-04-17 | End: 2025-04-17

## 2025-04-17 RX ORDER — CEFAZOLIN SODIUM 1 G/3ML
INJECTION, POWDER, FOR SOLUTION INTRAMUSCULAR; INTRAVENOUS
Status: DISCONTINUED | OUTPATIENT
Start: 2025-04-17 | End: 2025-04-17 | Stop reason: HOSPADM

## 2025-04-17 RX ORDER — CEFAZOLIN SODIUM 1 G/3ML
INJECTION, POWDER, FOR SOLUTION INTRAMUSCULAR; INTRAVENOUS PRN
Status: DISCONTINUED | OUTPATIENT
Start: 2025-04-17 | End: 2025-04-17 | Stop reason: SURG

## 2025-04-17 RX ORDER — ONDANSETRON 2 MG/ML
4 INJECTION INTRAMUSCULAR; INTRAVENOUS
Status: COMPLETED | OUTPATIENT
Start: 2025-04-17 | End: 2025-04-17

## 2025-04-17 RX ORDER — LABETALOL HYDROCHLORIDE 5 MG/ML
5 INJECTION, SOLUTION INTRAVENOUS
Status: DISCONTINUED | OUTPATIENT
Start: 2025-04-17 | End: 2025-04-17 | Stop reason: HOSPADM

## 2025-04-17 RX ORDER — DEXAMETHASONE SODIUM PHOSPHATE 4 MG/ML
INJECTION, SOLUTION INTRA-ARTICULAR; INTRALESIONAL; INTRAMUSCULAR; INTRAVENOUS; SOFT TISSUE PRN
Status: DISCONTINUED | OUTPATIENT
Start: 2025-04-17 | End: 2025-04-17 | Stop reason: SURG

## 2025-04-17 RX ORDER — ROCURONIUM BROMIDE 10 MG/ML
INJECTION, SOLUTION INTRAVENOUS PRN
Status: DISCONTINUED | OUTPATIENT
Start: 2025-04-17 | End: 2025-04-17 | Stop reason: SURG

## 2025-04-17 RX ADMIN — FENTANYL CITRATE 50 MCG: 50 INJECTION, SOLUTION INTRAMUSCULAR; INTRAVENOUS at 07:46

## 2025-04-17 RX ADMIN — ONDANSETRON 4 MG: 2 INJECTION INTRAMUSCULAR; INTRAVENOUS at 08:23

## 2025-04-17 RX ADMIN — LIDOCAINE HYDROCHLORIDE 80 MG: 20 INJECTION, SOLUTION EPIDURAL; INFILTRATION; INTRACAUDAL; PERINEURAL at 07:29

## 2025-04-17 RX ADMIN — SODIUM CHLORIDE, POTASSIUM CHLORIDE, SODIUM LACTATE AND CALCIUM CHLORIDE: 600; 310; 30; 20 INJECTION, SOLUTION INTRAVENOUS at 06:32

## 2025-04-17 RX ADMIN — CEFAZOLIN 2 G: 1 INJECTION, POWDER, FOR SOLUTION INTRAMUSCULAR; INTRAVENOUS at 07:33

## 2025-04-17 RX ADMIN — FENTANYL CITRATE 50 MCG: 50 INJECTION, SOLUTION INTRAMUSCULAR; INTRAVENOUS at 07:28

## 2025-04-17 RX ADMIN — PROPOFOL 50 MG: 10 INJECTION, EMULSION INTRAVENOUS at 07:32

## 2025-04-17 RX ADMIN — FENTANYL CITRATE 50 MCG: 50 INJECTION, SOLUTION INTRAMUSCULAR; INTRAVENOUS at 09:05

## 2025-04-17 RX ADMIN — FENTANYL CITRATE 50 MCG: 50 INJECTION, SOLUTION INTRAMUSCULAR; INTRAVENOUS at 09:15

## 2025-04-17 RX ADMIN — HYDROMORPHONE HYDROCHLORIDE 0.4 MG: 2 INJECTION INTRAMUSCULAR; INTRAVENOUS; SUBCUTANEOUS at 08:34

## 2025-04-17 RX ADMIN — ROCURONIUM BROMIDE 50 MG: 10 INJECTION INTRAVENOUS at 07:29

## 2025-04-17 RX ADMIN — DEXAMETHASONE SODIUM PHOSPHATE 8 MG: 4 INJECTION INTRA-ARTICULAR; INTRALESIONAL; INTRAMUSCULAR; INTRAVENOUS; SOFT TISSUE at 07:29

## 2025-04-17 RX ADMIN — ONDANSETRON 4 MG: 2 INJECTION INTRAMUSCULAR; INTRAVENOUS at 09:04

## 2025-04-17 RX ADMIN — PROPOFOL 150 MG: 10 INJECTION, EMULSION INTRAVENOUS at 07:29

## 2025-04-17 RX ADMIN — METOCLOPRAMIDE HYDROCHLORIDE 10 MG: 5 INJECTION INTRAMUSCULAR; INTRAVENOUS at 07:29

## 2025-04-17 RX ADMIN — OXYCODONE HYDROCHLORIDE 10 MG: 5 SOLUTION ORAL at 09:04

## 2025-04-17 RX ADMIN — SUGAMMADEX 200 MG: 100 INJECTION, SOLUTION INTRAVENOUS at 08:34

## 2025-04-17 RX ADMIN — PHENYLEPHRINE HYDROCHLORIDE 30 MCG/MIN: 10 INJECTION INTRAVENOUS at 07:42

## 2025-04-17 RX ADMIN — HYDROMORPHONE HYDROCHLORIDE 0.4 MG: 2 INJECTION INTRAMUSCULAR; INTRAVENOUS; SUBCUTANEOUS at 08:23

## 2025-04-17 RX ADMIN — PROPOFOL 30 MCG/KG/MIN: 10 INJECTION, EMULSION INTRAVENOUS at 07:38

## 2025-04-17 RX ADMIN — MAGNESIUM SULFATE HEPTAHYDRATE 4 G: 4 INJECTION, SOLUTION INTRAVENOUS at 07:54

## 2025-04-17 RX ADMIN — ACETAMINOPHEN 1000 MG: 500 TABLET, FILM COATED ORAL at 06:32

## 2025-04-17 ASSESSMENT — PAIN DESCRIPTION - PAIN TYPE
TYPE: SURGICAL PAIN

## 2025-04-17 ASSESSMENT — FIBROSIS 4 INDEX
FIB4 SCORE: 2.03
FIB4 SCORE: 2.03

## 2025-04-17 NOTE — ANESTHESIA TIME REPORT
Anesthesia Start and Stop Event Times       Date Time Event    4/17/2025 0717 Ready for Procedure     0724 Anesthesia Start     0842 Anesthesia Stop          Responsible Staff  04/17/25      Name Role Begin End    Jennie Hernandez M.D. Anesth 0724 0842          Overtime Reason:  no overtime (within assigned shift)    Comments:

## 2025-04-17 NOTE — ANESTHESIA PREPROCEDURE EVALUATION
Case: [id number] Date/Time: 04/17/25 0715    Procedure: POSTERIOR LEFT L4-5 LUMBAR INTERNAL DECOMPRESSION    Pre-op diagnosis: SPINAL STENOSIS OF LUMBAR REGION    Location: [facility] 05 / SURGERY [facility]    Surgeons: Santy Hansen M.D.            Relevant Problems   ANESTHESIA   (positive) PONV (postoperative nausea and vomiting)      PULMONARY (within normal limits)      NEURO   (negative) CVA (cerebral vascular accident) (HCC)   (negative) Neuromuscular disease (HCC)   (negative) TIA (transient ischemic attack)      CARDIAC   (positive) HTN (hypertension)   (positive) Symptomatic PVCs      GI   (positive) GERD (gastroesophageal reflux disease)      ENDO (within normal limits)       Physical Exam    Airway   Mallampati: II  TM distance: >3 FB  Neck ROM: full       Cardiovascular - normal exam  Rhythm: regular  Rate: normal  (-) murmur     Dental - normal exam           Pulmonary - normal exam  Breath sounds clear to auscultation     Abdominal    Neurological - normal exam                   Anesthesia Plan    ASA 2       Plan - general       Airway plan will be ETT          Induction: intravenous    Postoperative Plan: Postoperative administration of opioids is intended.    Pertinent diagnostic labs and testing reviewed    Informed Consent:    Anesthetic plan and risks discussed with patient.    Use of blood products discussed with: patient whom consented to blood products.

## 2025-04-17 NOTE — ANESTHESIA PROCEDURE NOTES
Airway    Date/Time: 4/17/2025 7:32 AM    Performed by: Jennie Hernandez M.D.  Authorized by: Jennie Hernandez M.D.    Location:  OR  Urgency:  Elective  Indications for Airway Management:  Anesthesia      Spontaneous Ventilation: absent    Sedation Level:  Deep  Preoxygenated: Yes    Patient Position:  Sniffing  Mask Difficulty Assessment:  1 - vent by mask  Final Airway Type:  Endotracheal airway  Final Endotracheal Airway:  ETT  Cuffed: Yes    Technique Used for Successful ETT Placement:  Direct laryngoscopy    Insertion Site:  Oral  Blade Type:  Barrett  Laryngoscope Blade/Videolaryngoscope Blade Size:  3  ETT Size (mm):  7.0  Measured from:  Teeth  ETT to Teeth (cm):  22  Placement Verified by: auscultation and capnometry    Cormack-Lehane Classification:  Grade I - full view of glottis  Number of Attempts at Approach:  1

## 2025-04-17 NOTE — ANESTHESIA POSTPROCEDURE EVALUATION
Patient: Keisha Lyle    Procedure Summary       Date: 04/17/25 Room / Location: [facility] 05 / SURGERY [facility]    Anesthesia Start: 0724 Anesthesia Stop: 0842    Procedure: BILATERAL LEFT L4-5 LUMBAR INTERNAL DECOMPRESSION (Spine Lumbar) Diagnosis: (SPINAL STENOSIS OF LUMBAR REGION)    Surgeons: Santy Hansen M.D. Responsible Provider: Jennie Hernandez M.D.    Anesthesia Type: general ASA Status: 2            Final Anesthesia Type: general  Last vitals  BP   Blood Pressure : 120/71    Temp   36.4 °C (97.6 °F)    Pulse   70   Resp   14    SpO2   95 %      Anesthesia Post Evaluation    Patient location during evaluation: PACU  Patient participation: complete - patient participated  Level of consciousness: awake and alert    Airway patency: patent  Anesthetic complications: no  Cardiovascular status: hemodynamically stable  Respiratory status: acceptable  Hydration status: euvolemic    PONV: none          There were no known notable events for this encounter.     Nurse Pain Score: 4 (NPRS)

## 2025-04-17 NOTE — DISCHARGE INSTRUCTIONS
HOME CARE INSTRUCTIONS    ACTIVITY: Rest and take it easy for the first 24 hours.  A responsible adult is recommended to remain with you during that time.  It is normal to feel sleepy.  We encourage you to not do anything that requires balance, judgment or coordination.    FOR 24 HOURS DO NOT:  Drive, operate machinery or run household appliances.  Drink beer or alcoholic beverages.  Make important decisions or sign legal documents.    SPECIAL INSTRUCTIONS:     No jumping/jarring for two months.   NO lifting, pulling, pushing greater than 15lbs for two months.   May remove incisional dressing tomorrow at 0900.   Once dressing off may shower at that time.   May remove drain bandaid 24 hours after removal.   Keep dressing open to air. Ok to get incision wet, do not submerge in water until steristrips off.   Keep steristrips 7-10 days.   No NSAIDs for one week.   Follow up in four weeks.   Pericolace OTC 2 PO qday while taking pain meds     DIET: To avoid nausea, slowly advance diet as tolerated, avoiding spicy or greasy foods for the first day.  Add more substantial food to your diet according to your physician's instructions.  Babies can be fed formula or breast milk as soon as they are hungry.  INCREASE FLUIDS AND FIBER TO AVOID CONSTIPATION.    MEDICATIONS: Resume taking daily medication.  Take prescribed pain medication with food.  If no medication is prescribed, you may take non-aspirin pain medication if needed.  PAIN MEDICATION CAN BE VERY CONSTIPATING.  Take a stool softener or laxative such as senokot, pericolace, or milk of magnesia if needed.    Prescription given for                      Oxycodone            .    Last pain medication given Tylenol 1000mg at 6:35 AM; Oxycodone 10mg at 9:05 AM    A follow-up appointment should be arranged with your doctor in 1-2 weeks ; call to schedule.    You should CALL YOUR PHYSICIAN if you develop:  Fever greater than 101 degrees F.  Pain not relieved by medication, or  persistent nausea or vomiting.  Excessive bleeding (blood soaking through dressing) or unexpected drainage from the wound.  Extreme redness or swelling around the incision site, drainage of pus or foul smelling drainage.  Inability to urinate or empty your bladder within 8 hours.  Problems with breathing or chest pain.    You should call 911 if you develop problems with breathing or chest pain.  If you are unable to contact your doctor or surgical center, you should go to the nearest emergency room or urgent care center.  Physician's telephone #: Dr Hansen [phone]     MILD FLU-LIKE SYMPTOMS ARE NORMAL.  YOU MAY EXPERIENCE GENERALIZED MUSCLE ACHES, THROAT IRRITATION, HEADACHE AND/OR SOME NAUSEA.    If any questions arise, call your doctor.  If your doctor is not available, please feel free to call the Surgical Center at [phone].  The Center is open Monday through Friday from 7AM to 7PM.      A registered nurse may call you a few days after your surgery to see how you are doing after your procedure.    You may also receive a survey in the mail within the next two weeks and we ask that you take a few moments to complete the survey and return it to us.  Our goal is to provide you with very good care and we value your comments.     Depression / Suicide Risk    As you are discharged from this [facility], it is important to learn how to keep safe from harming yourself.    Recognize the warning signs:  Abrupt changes in personality, positive or negative- including increase in energy   Giving away possessions  Change in eating patterns- significant weight changes-  positive or negative  Change in sleeping patterns- unable to sleep or sleeping all the time   Unwillingness or inability to communicate  Depression  Unusual sadness, discouragement and loneliness  Talk of wanting to die  Neglect of personal appearance   Rebelliousness- reckless behavior  Withdrawal from people/activities they  love  Confusion- inability to concentrate     If you or a loved one observes any of these behaviors or has concerns about self-harm, here's what you can do:  Talk about it- your feelings and reasons for harming yourself  Remove any means that you might use to hurt yourself (examples: pills, rope, extension cords, firearm)  Get professional help from the community (Mental Health, Substance Abuse, psychological counseling)  Do not be alone:Call your Safe Contact- someone whom you trust who will be there for you.  Call your local CRISIS HOTLINE 104-8368 or 969-861-5737  Call your local Children's Mobile Crisis Response Team [place] (891) 063-9209 or www.[employer]  Call the toll free National Suicide Prevention Hotlines   National Suicide Prevention Lifeline 926-833-LFHH (9105)  National Hope Line Network 800-SUICIDE (318-6939)    I acknowledge receipt and understanding of these Home Care instructions.

## 2025-04-17 NOTE — PROGRESS NOTES
Pharmacy Medication Reconciliation      ~Medication reconciliation updated and complete per patient   ~Allergies have been verified and updated   ~No oral ABX within the last 30 days  ~Is dispense history available in EPIC: yes  ~Patient home pharmacy :  [facility] [phone]      ~Anticoagulants (rivaroxaban, apixaban, edoxaban, dabigatran, warfarin, enoxaparin) taken in the last 14 days? No

## 2025-04-17 NOTE — OP REPORT
DATE OF SERVICE:  04/17/2025     PREOPERATIVE DIAGNOSIS:  Lumbar stenosis with radiculopathy.     POSTOPERATIVE DIAGNOSIS:  Lumbar stenosis with radiculopathy.     OPERATIONS:  Bilateral L4-5 laminotomies, medial facetectomies, decompression   of bilateral L5 nerve roots.     SURGEON:  Santy Hansen MD     ASSISTANT:  HOMA Guevara     ANESTHESIA:  General endotracheal.     ANESTHESIOLOGIST:  Jennie Hernandez MD     PREPARATION:  ChloraPrep.     MEDICATIONS:  The patient was given Ancef prior to incision.     INDICATIONS:  The patient mainly with left-sided radicular pain.  She was   starting to complain of some right-sided symptoms.  I amended the procedure to   a left bilateral 4-5 laminotomies.  The goal of surgery is to improve pain.   Biggest risk of surgery is nonresponse, which is 10% to 15% for which further   surgery may or may not be indicated.  Small risk of wound infection, spinal   fluid leak, paralysis would be exceedingly rare. The chance that she required   another operation, the rest of her life roughly 15%.  The patient was   understanding and agreed to proceed.  Signed consent.     NEED FOR SURGICAL ASSISTANCE:  Surgical assist required for traction, suction   irrigation, cleaning of instruments, keep the case moving forward to minimize   operative time and complications associated with prolonged operative time,   namely infection.     DESCRIPTION OF PROCEDURE:  The patient was brought to the operating room.    Peripheral venous lines were in place.  General anesthesia was induced.  The   patient was intubated.  No Guerra catheter was placed.  The patient was laid   prone on the OSI table using the 6 bolsters.  Pressure points were carefully   padded.  The back was shaved, doubly prepped with ChloraPrep by myself and   draped.  Planned incision was marked in the midline over the spinous processes   of 4 and 5.  Skin was infiltrated with local and using a subperiosteal   dissection,  paravertebral muscles dissected off the spinous process lamina of   4 and 5 bilaterally.  The level was confirmed with intraoperative fluoroscopy.    Unilateral Vernon retractors were placed on both sides and on both sides,   I did laminotomies in a similar fashion. Using [facility] drill with AM-8 bit,   I drilled the inferior portion of lamina above, superior portion of the lamina   below and the medial facet to paper thin shelf of bone.  The thin shelf of   bone was removed with piecemeal fascia with 2 and 3 mm Kerrisons.  I worked   laterally to the medial aspect of the pedicle inferiorly to mid pedicular   levels, superior to the insertional ligamentum flavum above the origin of the   root from thecal sac, above the level of the disk space.  In this fashion, the   origins of the 5 roots were completely unroofed in the lateral recess.  I   could slide the nerve probe along the lateral recess around the bilateral 5   pedicles without compromise.  Midline structures were maintained.  Bone   bleeders were controlled with bone wax, minor epidural bleeding controlled   with Gelfoam powder mixed with thrombin, which was subsequently irrigated   away.  Muscle bleeders controlled with bipolar electrocautery.  A medium   Hemovac drain was placed in the depth of the wound, brought through a separate   stab incision.  Deep fascia was closed with 0 Vicryl, subcutaneous fascia   with 0 Vicryl, subcuticular closed with 3-0 Vicryl.  Skin edges were   approximated with quarter-inch Steri-Strips.  The drain was sutured in place   at the exit site with 0 Vicryl, connected to closed drainage system.  Sterile   dressings were placed.  The patient laid supine on the bed.  Final sponge,   needle counted, counts correct.     ESTIMATED BLOOD LOSS:  50 mL.        ______________________________  MD DENISE ESPINOZA/KENDRICK    DD:  04/17/2025 09:02  DT:  04/17/2025 11:28    Job#:  [id number]

## 2025-04-18 NOTE — OR NURSING
A~1010:  Arrived from PACU   Pt is A&O x4, Pt's VSS; denies N/V; states pain is at tolerable level. Surgical dressing CDI to lower back with x1 Hemo-vac, no active bleeding noted.    ~1130:  Pt able to void in the bathroom without any difficulty    ~1210:  Phase 2 - hemo-vac output recorded (see flowsheet)  Informed XENIA Mukherjee about the output     ~1230:  XENIA Mukherjee came to see pt at bedside  Verbal order received - will empty the hemo-vac again (in hour since the last output) and to inform her about the output again    ~1318:  Yaz MICHAELS made aware of the output  Verbal order received: discharge order and to discontinue hemo-vac prior before discharge pt.    1355:  D/c orders received. IV dc'd. Pt changed into clothing with assistance. Discharge reviewed, Pt and family verbalized understanding and questions answered. Patient states ready to d/c home. Pt dc'd in w/c with family.

## 2025-05-01 PROCEDURE — RXMED WILLOW AMBULATORY MEDICATION CHARGE: Performed by: NURSE PRACTITIONER

## 2025-05-02 ENCOUNTER — PHARMACY VISIT (OUTPATIENT)
Dept: PHARMACY | Facility: MEDICAL CENTER | Age: 70
End: 2025-05-02
Payer: COMMERCIAL

## 2025-05-06 ENCOUNTER — TELEPHONE (OUTPATIENT)
Dept: FAMILY PLANNING/WOMEN'S HEALTH CLINIC | Facility: PHYSICIAN GROUP | Age: 70
End: 2025-05-06
Payer: MEDICARE

## 2025-05-21 ENCOUNTER — APPOINTMENT (OUTPATIENT)
Dept: URBAN - METROPOLITAN AREA CLINIC 36 | Facility: CLINIC | Age: 70
Setting detail: DERMATOLOGY
End: 2025-05-21

## 2025-05-21 DIAGNOSIS — L57.0 ACTINIC KERATOSIS: ICD-10-CM

## 2025-05-21 PROBLEM — C44.1192 BASAL CELL CARCINOMA OF SKIN OF LEFT LOWER EYELID, INCLUDING CANTHUS: Status: ACTIVE | Noted: 2025-05-21

## 2025-05-21 PROCEDURE — 17000 DESTRUCT PREMALG LESION: CPT

## 2025-05-21 PROCEDURE — 17003 DESTRUCT PREMALG LES 2-14: CPT

## 2025-05-21 PROCEDURE — ? LIQUID NITROGEN

## 2025-05-21 PROCEDURE — 99212 OFFICE O/P EST SF 10 MIN: CPT | Mod: 25

## 2025-05-21 PROCEDURE — ? OBSERVATION

## 2025-05-21 ASSESSMENT — LOCATION DETAILED DESCRIPTION DERM
LOCATION DETAILED: UPPER STERNUM
LOCATION DETAILED: MIDDLE STERNUM

## 2025-05-21 ASSESSMENT — LOCATION ZONE DERM: LOCATION ZONE: TRUNK

## 2025-05-21 ASSESSMENT — LOCATION SIMPLE DESCRIPTION DERM: LOCATION SIMPLE: CHEST

## 2025-05-21 NOTE — PROCEDURE: LIQUID NITROGEN
Number Of Freeze-Thaw Cycles: 2 freeze-thaw cycles
Duration Of Freeze Thaw-Cycle (Seconds): 8
Post-Care Instructions: I reviewed with the patient in detail post-care instructions. Patient is to wear sunprotection, and avoid picking at any of the treated lesions. Pt may apply Vaseline to crusted or scabbing areas.
Render Note In Bullet Format When Appropriate: No
Show Applicator Variable?: Yes
Consent: The patient's consent was obtained including but not limited to risks of crusting, scabbing, blistering, scarring, darker or lighter pigmentary change, recurrence, incomplete removal and infection.
Detail Level: Detailed

## 2025-06-08 ENCOUNTER — PATIENT MESSAGE (OUTPATIENT)
Dept: MEDICAL GROUP | Age: 70
End: 2025-06-08
Payer: MEDICARE

## 2025-06-08 DIAGNOSIS — R00.2 PALPITATIONS: ICD-10-CM

## 2025-06-08 DIAGNOSIS — I49.3 SYMPTOMATIC PVCS: ICD-10-CM

## 2025-06-08 DIAGNOSIS — I10 PRIMARY HYPERTENSION: ICD-10-CM

## 2025-06-09 NOTE — TELEPHONE ENCOUNTER
Is the patient due for a refill? Yes    Was the patient seen the last 15 months? No    Date of last office visit: 03.25.2024    Does the patient have an upcoming appointment?  No       Provider to refill: MT    Does the patient have [facility] and need 100-day supply? (This applies to ALL medications) Yes, quantity updated to 100 days

## 2025-06-10 PROCEDURE — RXMED WILLOW AMBULATORY MEDICATION CHARGE: Performed by: FAMILY MEDICINE

## 2025-06-10 RX ORDER — LOSARTAN POTASSIUM 100 MG/1
100 TABLET ORAL DAILY
Qty: 100 TABLET | Refills: 0 | Status: SHIPPED | OUTPATIENT
Start: 2025-06-10 | End: 2025-06-10 | Stop reason: SDUPTHER

## 2025-06-10 RX ORDER — LOSARTAN POTASSIUM 100 MG/1
100 TABLET ORAL DAILY
Qty: 100 TABLET | Refills: 2 | Status: SHIPPED | OUTPATIENT
Start: 2025-06-10

## 2025-06-10 RX ORDER — METOPROLOL SUCCINATE 25 MG/1
25 TABLET, EXTENDED RELEASE ORAL 2 TIMES DAILY
Qty: 180 TABLET | Refills: 0 | Status: SHIPPED | OUTPATIENT
Start: 2025-06-10 | End: 2025-06-10 | Stop reason: SDUPTHER

## 2025-06-10 RX ORDER — METOPROLOL SUCCINATE 25 MG/1
25 TABLET, EXTENDED RELEASE ORAL 2 TIMES DAILY
Qty: 180 TABLET | Refills: 2 | Status: SHIPPED | OUTPATIENT
Start: 2025-06-10

## 2025-06-11 ENCOUNTER — PHARMACY VISIT (OUTPATIENT)
Dept: PHARMACY | Facility: MEDICAL CENTER | Age: 70
End: 2025-06-11
Payer: COMMERCIAL

## 2025-07-10 PROCEDURE — RXMED WILLOW AMBULATORY MEDICATION CHARGE: Performed by: OBSTETRICS & GYNECOLOGY

## 2025-07-13 ENCOUNTER — PHARMACY VISIT (OUTPATIENT)
Dept: PHARMACY | Facility: MEDICAL CENTER | Age: 70
End: 2025-07-13
Payer: COMMERCIAL

## 2025-07-25 ENCOUNTER — APPOINTMENT (OUTPATIENT)
Facility: MEDICAL CENTER | Age: 70
End: 2025-07-25
Attending: FAMILY MEDICINE
Payer: MEDICARE

## 2025-07-25 DIAGNOSIS — Z12.31 VISIT FOR SCREENING MAMMOGRAM: ICD-10-CM

## 2025-08-04 ENCOUNTER — APPOINTMENT (OUTPATIENT)
Facility: MEDICAL CENTER | Age: 70
End: 2025-08-04
Attending: FAMILY MEDICINE
Payer: MEDICARE

## 2025-08-04 DIAGNOSIS — Z12.31 VISIT FOR SCREENING MAMMOGRAM: ICD-10-CM

## 2025-08-05 ENCOUNTER — HOSPITAL ENCOUNTER (OUTPATIENT)
Dept: RADIOLOGY | Facility: MEDICAL CENTER | Age: 70
End: 2025-08-05
Attending: FAMILY MEDICINE
Payer: MEDICARE

## 2025-08-05 VITALS — HEIGHT: 67 IN | WEIGHT: 145 LBS | BODY MASS INDEX: 22.76 KG/M2

## 2025-08-05 DIAGNOSIS — Z12.31 VISIT FOR SCREENING MAMMOGRAM: ICD-10-CM

## 2025-08-05 PROCEDURE — 77067 SCR MAMMO BI INCL CAD: CPT

## 2025-08-05 ASSESSMENT — FIBROSIS 4 INDEX: FIB4 SCORE: 2.03

## 2025-08-11 PROCEDURE — RXMED WILLOW AMBULATORY MEDICATION CHARGE: Performed by: OBSTETRICS & GYNECOLOGY

## 2025-08-19 PROCEDURE — RXMED WILLOW AMBULATORY MEDICATION CHARGE: Performed by: FAMILY MEDICINE

## 2025-08-20 ENCOUNTER — PHARMACY VISIT (OUTPATIENT)
Dept: PHARMACY | Facility: MEDICAL CENTER | Age: 70
End: 2025-08-20
Payer: COMMERCIAL

## 2025-08-21 ENCOUNTER — OFFICE VISIT (OUTPATIENT)
Dept: MEDICAL GROUP | Age: 70
End: 2025-08-21
Payer: MEDICARE

## 2025-08-21 VITALS
SYSTOLIC BLOOD PRESSURE: 96 MMHG | OXYGEN SATURATION: 96 % | DIASTOLIC BLOOD PRESSURE: 68 MMHG | HEIGHT: 67 IN | RESPIRATION RATE: 16 BRPM | WEIGHT: 149 LBS | HEART RATE: 88 BPM | BODY MASS INDEX: 23.39 KG/M2 | TEMPERATURE: 97.4 F

## 2025-08-21 DIAGNOSIS — I10 PRIMARY HYPERTENSION: ICD-10-CM

## 2025-08-21 DIAGNOSIS — I49.3 SYMPTOMATIC PVCS: ICD-10-CM

## 2025-08-21 DIAGNOSIS — Z00.00 MEDICARE ANNUAL WELLNESS VISIT, INITIAL: ICD-10-CM

## 2025-08-21 DIAGNOSIS — R73.09 ELEVATED GLUCOSE: ICD-10-CM

## 2025-08-21 DIAGNOSIS — E55.9 VITAMIN D DEFICIENCY: Primary | ICD-10-CM

## 2025-08-21 DIAGNOSIS — E78.00 PURE HYPERCHOLESTEROLEMIA: ICD-10-CM

## 2025-08-21 PROCEDURE — G0439 PPPS, SUBSEQ VISIT: HCPCS | Performed by: FAMILY MEDICINE

## 2025-08-21 PROCEDURE — 3074F SYST BP LT 130 MM HG: CPT | Performed by: FAMILY MEDICINE

## 2025-08-21 PROCEDURE — 3078F DIAST BP <80 MM HG: CPT | Performed by: FAMILY MEDICINE

## 2025-08-21 RX ORDER — ONDANSETRON 4 MG/1
4 TABLET, ORALLY DISINTEGRATING ORAL EVERY 6 HOURS PRN
Qty: 20 TABLET | Refills: 2 | Status: SHIPPED | OUTPATIENT
Start: 2025-08-21

## 2025-08-21 ASSESSMENT — FIBROSIS 4 INDEX: FIB4 SCORE: 2.03

## 2025-08-21 ASSESSMENT — PATIENT HEALTH QUESTIONNAIRE - PHQ9: CLINICAL INTERPRETATION OF PHQ2 SCORE: 0

## 2025-08-21 ASSESSMENT — ENCOUNTER SYMPTOMS: GENERAL WELL-BEING: EXCELLENT

## 2025-08-21 ASSESSMENT — ACTIVITIES OF DAILY LIVING (ADL): BATHING_REQUIRES_ASSISTANCE: 0

## 2025-08-22 PROCEDURE — RXMED WILLOW AMBULATORY MEDICATION CHARGE: Performed by: FAMILY MEDICINE

## 2025-08-22 PROCEDURE — RXMED WILLOW AMBULATORY MEDICATION CHARGE: Performed by: OBSTETRICS & GYNECOLOGY

## 2025-08-25 PROCEDURE — RXMED WILLOW AMBULATORY MEDICATION CHARGE: Performed by: FAMILY MEDICINE

## 2025-08-26 ENCOUNTER — PHARMACY VISIT (OUTPATIENT)
Dept: PHARMACY | Facility: MEDICAL CENTER | Age: 70
End: 2025-08-26
Payer: COMMERCIAL

## 2025-08-27 DIAGNOSIS — Z00.6 CLINICAL TRIAL PARTICIPANT: ICD-10-CM

## 2025-09-25 ENCOUNTER — APPOINTMENT (OUTPATIENT)
Dept: MEDICAL GROUP | Age: 70
End: 2025-09-25
Payer: MEDICARE

## (undated) DEVICE — SUTURE 0 VICRYL PLUS CT-1 - 8 X 18 INCH (12/BX)

## (undated) DEVICE — GLOVE BIOGEL PI ORTHO SZ 7.5 PF LF (40PR/BX)

## (undated) DEVICE — SET EXTENSION WITH 2 PORTS (48EA/CA) ***PART #2C8610 IS A SUBSTITUTE*****

## (undated) DEVICE — ADHESIVE MASTISOL - (48/BX)

## (undated) DEVICE — PACK NEURO - (3EA/CA)

## (undated) DEVICE — KIT EVACUATER 3 SPRING PVC LF 1/8 DRAIN SIZE (10EA/CA)"

## (undated) DEVICE — CLOSURE WOUND 1/4 X 4 (STERI - STRIP) (50/BX 4BX/CA)

## (undated) DEVICE — CHLORAPREP 26 ML APPLICATOR - ORANGE TINT(25/CA)

## (undated) DEVICE — SUTURE 3-0 VICRYL PLUS RB-1 - 8 X 18 INCH (12/BX)

## (undated) DEVICE — GLOVE SIZE 6.5 SURGEON ACCELERATOR FREE GREEN (50PR/BX)

## (undated) DEVICE — SUTURE 2-0 VICRYL PLUS CT-1 - 8 X 18 INCH(12/BX)

## (undated) DEVICE — TUBING CLEARLINK DUO-VENT - C-FLO (48EA/CA)

## (undated) DEVICE — TOOL MR8 14CM MATCH HD SYM-TRI 3MM DIAMETER (1/EA)

## (undated) DEVICE — GLOVE BIOGEL SZ 6.5 SURGICAL PF LTX (50PR/BX 4BX/CA)

## (undated) DEVICE — GOWN WARMING STANDARD FLEX - (30/CA)

## (undated) DEVICE — BLADE SURGICAL CLIPPER - (50EA/CA)

## (undated) DEVICE — LACTATED RINGERS INJ 1000 ML - (14EA/CA 60CA/PF)

## (undated) DEVICE — MIDAS LUBRICATOR DIFFUSER PACK (4EA/CA)

## (undated) DEVICE — SPONGE GAUZESTER 4 X 4 4PLY - (128PK/CA)

## (undated) DEVICE — DRAPE 36X28IN RAD CARM BND BG - (25/CA)

## (undated) DEVICE — SLEEVE VASO DVT COMPRESSION CALF MED - (10PR/CA)

## (undated) DEVICE — CANISTER SUCTION 3000ML MECHANICAL FILTER AUTO SHUTOFF MEDI-VAC NONSTERILE LF DISP (40EA/CA)

## (undated) DEVICE — SUTURE GENERAL

## (undated) DEVICE — SENSOR OXIMETER ADULT SPO2 RD SET (20EA/BX)

## (undated) DEVICE — COVER MAYO STAND X-LG - (22EA/CA)

## (undated) DEVICE — CLEANER ELECTRO-SURGICAL TIP - (25/BX 4BX/CA)

## (undated) DEVICE — COVER LIGHT HANDLE ALC PLUS DISP (18EA/BX)

## (undated) DEVICE — SET LEADWIRE 5 LEAD BEDSIDE DISPOSABLE ECG (1SET OF 5/EA)

## (undated) DEVICE — LACTATED RINGERS INJ. 500 ML - (24EA/CA)

## (undated) DEVICE — ELECTRODE DUAL RETURN W/ CORD - (50/PK)

## (undated) DEVICE — TUBING C&T SET FLYING LEADS DRAIN TUBING (10EA/BX)

## (undated) DEVICE — DRAPE LAPAROTOMY T SHEET - (12EA/CA)

## (undated) DEVICE — SYRINGE NON SAFETY 10 CC 21 GA X 1-1/2 IN (100/BX 4BX/CA)

## (undated) DEVICE — SUCTION INSTRUMENT YANKAUER BULBOUS TIP W/O VENT (50EA/CA)

## (undated) DEVICE — DRAPE STRLE REG TOWEL 18X24 - (10/BX 4BX/CA)"

## (undated) DEVICE — KIT SURGIFLO W/OUT THROMBIN - (6EA/BX)

## (undated) DEVICE — GLOVE BIOGEL INDICATOR SZ 7SURGICAL PF LTX - (50/BX 4BX/CA)

## (undated) DEVICE — SODIUM CHL IRRIGATION 0.9% 1000ML (12EA/CA)

## (undated) DEVICE — HEADREST PRONEVIEW LARGE - (10/CA)

## (undated) DEVICE — ARMREST CRADLE FOAM - (2PR/PK 12PR/CA)